# Patient Record
Sex: MALE | Race: WHITE
[De-identification: names, ages, dates, MRNs, and addresses within clinical notes are randomized per-mention and may not be internally consistent; named-entity substitution may affect disease eponyms.]

---

## 2018-02-09 ENCOUNTER — HOSPITAL ENCOUNTER (OUTPATIENT)
Dept: HOSPITAL 75 - RAD | Age: 58
End: 2018-02-09
Attending: FAMILY MEDICINE
Payer: MEDICAID

## 2018-02-09 ENCOUNTER — HOSPITAL ENCOUNTER (INPATIENT)
Dept: HOSPITAL 75 - ER | Age: 58
LOS: 6 days | Discharge: NURSING, REHAB OR LONG-TERM CARE | DRG: 470 | End: 2018-02-15
Attending: FAMILY MEDICINE | Admitting: INTERNAL MEDICINE
Payer: MEDICAID

## 2018-02-09 VITALS — SYSTOLIC BLOOD PRESSURE: 152 MMHG | DIASTOLIC BLOOD PRESSURE: 92 MMHG

## 2018-02-09 VITALS — SYSTOLIC BLOOD PRESSURE: 132 MMHG | DIASTOLIC BLOOD PRESSURE: 98 MMHG

## 2018-02-09 VITALS — BODY MASS INDEX: 27.76 KG/M2 | WEIGHT: 183.19 LBS | HEIGHT: 68 IN
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
## 2018-02-09 DIAGNOSIS — F31.9: ICD-10-CM

## 2018-02-09 DIAGNOSIS — S72.012A: Primary | ICD-10-CM

## 2018-02-09 DIAGNOSIS — R09.89: ICD-10-CM
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
## 2018-02-09 DIAGNOSIS — Z66: ICD-10-CM

## 2018-02-09 DIAGNOSIS — S72.002A: Primary | ICD-10-CM

## 2018-02-09 DIAGNOSIS — G20: ICD-10-CM

## 2018-02-09 DIAGNOSIS — F03.90: ICD-10-CM

## 2018-02-09 DIAGNOSIS — F41.9: ICD-10-CM

## 2018-02-09 LAB
ALBUMIN SERPL-MCNC: 3.6 GM/DL (ref 3.2–4.5)
ALP SERPL-CCNC: 57 U/L (ref 40–136)
ALT SERPL-CCNC: 11 U/L (ref 0–55)
APTT BLD: 28 SEC (ref 24–35)
APTT PPP: (no result) S
BACTERIA #/AREA URNS HPF: (no result) /HPF
BASOPHILS # BLD AUTO: 0 10^3/UL (ref 0–0.1)
BASOPHILS NFR BLD AUTO: 0 % (ref 0–10)
BILIRUB SERPL-MCNC: 0.6 MG/DL (ref 0.1–1)
BILIRUB UR QL STRIP: NEGATIVE
BUN/CREAT SERPL: 36
CALCIUM SERPL-MCNC: 9.6 MG/DL (ref 8.5–10.1)
CHLORIDE SERPL-SCNC: 104 MMOL/L (ref 98–107)
CO2 SERPL-SCNC: 27 MMOL/L (ref 21–32)
CREAT SERPL-MCNC: 0.78 MG/DL (ref 0.6–1.3)
EOSINOPHIL # BLD AUTO: 0.1 10^3/UL (ref 0–0.3)
EOSINOPHIL NFR BLD AUTO: 1 % (ref 0–10)
EOSINOPHIL NFR BLD MANUAL: 2 %
ERYTHROCYTE [DISTWIDTH] IN BLOOD BY AUTOMATED COUNT: 13.7 % (ref 10–14.5)
FIBRINOGEN PPP-MCNC: CLEAR MG/DL
GFR SERPLBLD BASED ON 1.73 SQ M-ARVRAT: > 60 ML/MIN
GLUCOSE SERPL-MCNC: 128 MG/DL (ref 70–105)
GLUCOSE UR STRIP-MCNC: NEGATIVE MG/DL
HCT VFR BLD CALC: 43 % (ref 40–54)
HGB BLD-MCNC: 14.9 G/DL (ref 13.3–17.7)
INR PPP: 1 (ref 0.8–1.4)
KETONES UR QL STRIP: (no result)
LEUKOCYTE ESTERASE UR QL STRIP: (no result)
LYMPHOCYTES # BLD AUTO: 0.7 X 10^3 (ref 1–4)
LYMPHOCYTES NFR BLD AUTO: 6 % (ref 12–44)
MAGNESIUM SERPL-MCNC: 2 MG/DL (ref 1.8–2.4)
MANUAL DIFFERENTIAL PERFORMED BLD QL: YES
MCH RBC QN AUTO: 32 PG (ref 25–34)
MCHC RBC AUTO-ENTMCNC: 35 G/DL (ref 32–36)
MCV RBC AUTO: 91 FL (ref 80–99)
MONOCYTES # BLD AUTO: 1.5 X 10^3 (ref 0–1)
MONOCYTES NFR BLD AUTO: 13 % (ref 0–12)
MONOCYTES NFR BLD: 9 %
NEUTROPHILS # BLD AUTO: 9.3 X 10^3 (ref 1.8–7.8)
NEUTROPHILS NFR BLD AUTO: 80 % (ref 42–75)
NEUTS BAND NFR BLD MANUAL: 78 %
NITRITE UR QL STRIP: NEGATIVE
PH UR STRIP: 6 [PH] (ref 5–9)
PLATELET # BLD: 139 10^3/UL (ref 130–400)
PMV BLD AUTO: 11.5 FL (ref 7.4–10.4)
POTASSIUM SERPL-SCNC: 4 MMOL/L (ref 3.6–5)
PROT SERPL-MCNC: 7 GM/DL (ref 6.4–8.2)
PROT UR QL STRIP: (no result)
PROTHROMBIN TIME: 13.7 SEC (ref 12.2–14.7)
RBC # BLD AUTO: 4.66 10^6/UL (ref 4.35–5.85)
RBC #/AREA URNS HPF: (no result) /HPF
RBC MORPH BLD: NORMAL
SODIUM SERPL-SCNC: 141 MMOL/L (ref 135–145)
SP GR UR STRIP: 1.02 (ref 1.02–1.02)
SQUAMOUS #/AREA URNS HPF: (no result) /HPF
UROBILINOGEN UR-MCNC: 1 MG/DL
VARIANT LYMPHS NFR BLD MANUAL: 11 %
WBC # BLD AUTO: 11.6 10^3/UL (ref 4.3–11)
WBC #/AREA URNS HPF: (no result) /HPF

## 2018-02-09 PROCEDURE — 80053 COMPREHEN METABOLIC PANEL: CPT

## 2018-02-09 PROCEDURE — 80164 ASSAY DIPROPYLACETIC ACD TOT: CPT

## 2018-02-09 PROCEDURE — 80048 BASIC METABOLIC PNL TOTAL CA: CPT

## 2018-02-09 PROCEDURE — 87081 CULTURE SCREEN ONLY: CPT

## 2018-02-09 PROCEDURE — 85730 THROMBOPLASTIN TIME PARTIAL: CPT

## 2018-02-09 PROCEDURE — 94664 DEMO&/EVAL PT USE INHALER: CPT

## 2018-02-09 PROCEDURE — 36415 COLL VENOUS BLD VENIPUNCTURE: CPT

## 2018-02-09 PROCEDURE — 94640 AIRWAY INHALATION TREATMENT: CPT

## 2018-02-09 PROCEDURE — 85007 BL SMEAR W/DIFF WBC COUNT: CPT

## 2018-02-09 PROCEDURE — 96361 HYDRATE IV INFUSION ADD-ON: CPT

## 2018-02-09 PROCEDURE — 72170 X-RAY EXAM OF PELVIS: CPT

## 2018-02-09 PROCEDURE — 83605 ASSAY OF LACTIC ACID: CPT

## 2018-02-09 PROCEDURE — 87070 CULTURE OTHR SPECIMN AEROBIC: CPT

## 2018-02-09 PROCEDURE — 85027 COMPLETE CBC AUTOMATED: CPT

## 2018-02-09 PROCEDURE — 83880 ASSAY OF NATRIURETIC PEPTIDE: CPT

## 2018-02-09 PROCEDURE — 83735 ASSAY OF MAGNESIUM: CPT

## 2018-02-09 PROCEDURE — 87205 SMEAR GRAM STAIN: CPT

## 2018-02-09 PROCEDURE — 73502 X-RAY EXAM HIP UNI 2-3 VIEWS: CPT

## 2018-02-09 PROCEDURE — 96365 THER/PROPH/DIAG IV INF INIT: CPT

## 2018-02-09 PROCEDURE — 81000 URINALYSIS NONAUTO W/SCOPE: CPT

## 2018-02-09 PROCEDURE — 85025 COMPLETE CBC W/AUTO DIFF WBC: CPT

## 2018-02-09 PROCEDURE — 86141 C-REACTIVE PROTEIN HS: CPT

## 2018-02-09 PROCEDURE — 71045 X-RAY EXAM CHEST 1 VIEW: CPT

## 2018-02-09 PROCEDURE — 85014 HEMATOCRIT: CPT

## 2018-02-09 PROCEDURE — 72125 CT NECK SPINE W/O DYE: CPT

## 2018-02-09 PROCEDURE — 70450 CT HEAD/BRAIN W/O DYE: CPT

## 2018-02-09 PROCEDURE — 74019 RADEX ABDOMEN 2 VIEWS: CPT

## 2018-02-09 PROCEDURE — 85610 PROTHROMBIN TIME: CPT

## 2018-02-09 PROCEDURE — 87804 INFLUENZA ASSAY W/OPTIC: CPT

## 2018-02-09 PROCEDURE — 85018 HEMOGLOBIN: CPT

## 2018-02-09 PROCEDURE — 93005 ELECTROCARDIOGRAM TRACING: CPT

## 2018-02-09 PROCEDURE — 51702 INSERT TEMP BLADDER CATH: CPT

## 2018-02-09 PROCEDURE — 87040 BLOOD CULTURE FOR BACTERIA: CPT

## 2018-02-09 PROCEDURE — 73501 X-RAY EXAM HIP UNI 1 VIEW: CPT

## 2018-02-09 PROCEDURE — 94760 N-INVAS EAR/PLS OXIMETRY 1: CPT

## 2018-02-09 RX ADMIN — SODIUM CHLORIDE SCH MLS/HR: 900 INJECTION, SOLUTION INTRAVENOUS at 22:43

## 2018-02-09 NOTE — DIAGNOSTIC IMAGING REPORT
EXAMINATION: Left hip radiographs, two views.



COMPARISON: None. 



HISTORY: 57-year-old male, fall. Left hip pain. 



FINDINGS: There is a displaced left femoral neck fracture. The

primary distal fracture fragment is displaced superiorly by

approximately 1.8 cm. The left hip is not dislocated. There is no

pronounced joint space loss of the left hip, osteophyte

formation, or subchondral cystic change. The pubic symphysis is

normally aligned. There is no identified radiopaque foreign body.





IMPRESSION: 

1. Displaced fracture of the left femoral neck.  



Report was called to nurse Aguilera in the [facility] ER at 4:59

p.m., by martha (for YVONNE). 



Dictated by: 



  Dictated on workstation # [id number]

## 2018-02-09 NOTE — ED HIP PAIN/INJURY
General


Chief Complaint:  Hip/Pelvic Problems


Stated Complaint:  L HIP FX


Nursing Triage Note:  


PT TO ED FROM X-RAY PT HAD L HIP FX ON OUTPATIENT X-RAY. PT BROUGHT BY NH IN 


[place]. PT HAS POOR RESPONSE BY VERBAL, EYES CLOSED. NH STATES HAVE HAD PT FOR 

ONLY 


3 DAYS





LIVED AT HOME W BROTHER TAKING CARE OF HIM


Source:  patient, other (NH staff)


Exam Limitations:  physical impairment (nonverbal, dementia with behavioral 

disturbances, schizophrenia, Parkinson's disease)





History of Present Illness


Date Seen by Provider:  Feb 9, 2018





Allergies and Home Medications


Allergies


Coded Allergies:  


     No Known Drug Allergies (Unverified , 2/9/18)





Past Medical-Social-Family Hx


Patient Social History


Alcohol Use:  Denies Use


Recreational Drug Use:  No


Smoking Status:  Unknown if Ever Smoked


Recent Foreign Travel:  No


Contact w/Someone Who Travel:  No


Recent Infectious Disease Expo:  No


Recent Hopitalizations:  No (unknown)


Physical Abuse:  No


Sexual Abuse:  No





Seasonal Allergies


Seasonal Allergies:  No





Psychosocial


Suicide Risk Score:  0





Physical Exam


Vital Signs





Vital Signs - First Documented








 2/9/18 2/9/18





 15:43 19:29


 


Temp 100.3 


 


Pulse 83 


 


Resp 18 


 


B/P (MAP) 133/81 (98) 


 


Pulse Ox 95 


 


O2 Delivery  Room Air





Capillary Refill : Less Than 3 Seconds





Progress/Results/Core Measures


Results/Orders


Lab Results





Laboratory Tests








Test


  2/9/18


15:56 2/9/18


16:30 Range/Units


 


 


White Blood Count


  11.6 H


  


  4.3-11.0


10^3/uL


 


Red Blood Count


  4.66 


  


  4.35-5.85


10^6/uL


 


Hemoglobin 14.9   13.3-17.7  G/DL


 


Hematocrit 43   40-54  %


 


Mean Corpuscular Volume 91   80-99  FL


 


Mean Corpuscular Hemoglobin 32   25-34  PG


 


Mean Corpuscular Hemoglobin


Concent 35 


  


  32-36  G/DL


 


 


Red Cell Distribution Width 13.7   10.0-14.5  %


 


Platelet Count


  139 


  


  130-400


10^3/uL


 


Mean Platelet Volume 11.5 H  7.4-10.4  FL


 


Neutrophils (%) (Auto) 80 H  42-75  %


 


Lymphocytes (%) (Auto) 6 L  12-44  %


 


Monocytes (%) (Auto) 13 H  0-12  %


 


Eosinophils (%) (Auto) 1   0-10  %


 


Basophils (%) (Auto) 0   0-10  %


 


Neutrophils # (Auto) 9.3 H  1.8-7.8  X 10^3


 


Lymphocytes # (Auto) 0.7 L  1.0-4.0  X 10^3


 


Monocytes # (Auto) 1.5 H  0.0-1.0  X 10^3


 


Eosinophils # (Auto)


  0.1 


  


  0.0-0.3


10^3/uL


 


Basophils # (Auto)


  0.0 


  


  0.0-0.1


10^3/uL


 


Neutrophils % (Manual) 78    %


 


Lymphocytes % (Manual) 11    %


 


Monocytes % (Manual) 9    %


 


Eosinophils % (Manual) 2    %


 


Blood Morphology Comment NORMAL    


 


Prothrombin Time 13.7   12.2-14.7  SEC


 


INR Comment 1.0   0.8-1.4  


 


Activated Partial


Thromboplast Time 28 


  


  24-35  SEC


 


 


Sodium Level 141   135-145  MMOL/L


 


Potassium Level 4.0   3.6-5.0  MMOL/L


 


Chloride Level 104     MMOL/L


 


Carbon Dioxide Level 27   21-32  MMOL/L


 


Anion Gap 10   5-14  MMOL/L


 


Blood Urea Nitrogen 28 H  7-18  MG/DL


 


Creatinine


  0.78 


  


  0.60-1.30


MG/DL


 


Estimat Glomerular Filtration


Rate > 60 


  


   


 


 


BUN/Creatinine Ratio 36    


 


Glucose Level 128 H    MG/DL


 


Lactic Acid Level


  0.85 


  


  0.50-2.00


MMOL/L


 


Calcium Level 9.6   8.5-10.1  MG/DL


 


Magnesium Level 2.0   1.8-2.4  MG/DL


 


Total Bilirubin 0.6   0.1-1.0  MG/DL


 


Aspartate Amino Transf


(AST/SGOT) 40 H


  


  5-34  U/L


 


 


Alanine Aminotransferase


(ALT/SGPT) 11 


  


  0-55  U/L


 


 


Alkaline Phosphatase 57     U/L


 


C-Reactive Protein High


Sensitivity 12.32 H


  


  0.00-0.50


MG/DL


 


Total Protein 7.0   6.4-8.2  GM/DL


 


Albumin 3.6   3.2-4.5  GM/DL


 


Valproic Acid (Depakene) Level


  76.6 


  


  50.0-100.0


UG/ML


 


Urine Color  ORANGE   


 


Urine Clarity  CLEAR   


 


Urine pH  6  5-9  


 


Urine Specific Gravity  1.025 H 1.016-1.022  


 


Urine Protein  2+ H NEGATIVE  


 


Urine Glucose (UA)  NEGATIVE  NEGATIVE  


 


Urine Ketones  3+ H NEGATIVE  


 


Urine Nitrite  NEGATIVE  NEGATIVE  


 


Urine Bilirubin  NEGATIVE  NEGATIVE  


 


Urine Urobilinogen  1  NORMAL  MG/DL


 


Urine Leukocyte Esterase  1+ H NEGATIVE  


 


Urine RBC (Auto)  2+ H NEGATIVE  


 


Urine RBC  NONE   /HPF


 


Urine WBC  0-2   /HPF


 


Urine Squamous Epithelial


Cells 


  2-5 


   /HPF


 


 


Urine Crystals  NONE   /LPF


 


Urine Bacteria  TRACE   /HPF


 


Urine Casts  NONE   /LPF


 


Urine Mucus  LARGE H  /LPF


 


Urine Culture Indicated  NO   








Micro Results





Microbiology


2/9/18 Influenza Types A,B Antigen ([facility]) - Final, Complete


         





My Orders





Orders - JODIE AMBROSIO


Ns Iv 1000 Ml (Sodium Chloride 0.9%) (2/9/18 17:15)


Valproic Acid (2/9/18 17:18)


Ct Head/Cervical Spine Wo (2/9/18 17:21)


Ceftriaxone Injection (Rocephin Injectio (2/9/18 19:45)





Medications Given in ED





Current Medications








 Medications  Dose


 Ordered  Sig/Carlos


 Route  Start Time


 Stop Time Status Last Admin


Dose Admin


 


 Ceftriaxone


 Sodium 1000 mg/


 Sodium Chloride  50 ml @ 


 100 mls/hr  ONCE  ONCE


 IV  2/9/18 19:45


 2/9/18 20:14  2/9/18 19:48


100 MLS/HR


 


 Sodium Chloride  1,000 ml @ 


 0 mls/hr  Q0M ONCE


 IV  2/9/18 15:52


 2/9/18 15:53 DC 2/9/18 16:10


1,000 MLS/HR


 


 Sodium Chloride  1,000 ml @ 


 0 mls/hr  Q0M ONCE


 IV  2/9/18 17:15


 2/9/18 17:16 DC 2/9/18 17:24


1,000 MLS/HR








Vital Signs/I&O





Vital Sign - Last 12Hours








 2/9/18 2/9/18





 15:43 19:29


 


Temp 100.3 


 


Pulse 83 98


 


Resp 18 18


 


B/P (MAP) 133/81 (98) 132/98 (109)


 


Pulse Ox 95 95


 


O2 Delivery  Room Air














Blood Pressure Mean:  109











Departure


Impression


Disposition:  09 ADMITTED AS INPATIENT


Condition:  Stable





Admissions


Decision to Admit Reason:  Admit from ER (General)


Decision to Admit/Date:  Feb 9, 2018


Time/Decision to Admit Time:  19:30





Departure-Patient Inst.


Referrals:  


CORNELIUS SHANE DO (PCP/Family)


Primary Care Physician











JODIE AMBROSIO Feb 9, 2018 19:56

## 2018-02-09 NOTE — DIAGNOSTIC IMAGING REPORT
INDICATION: Preop for hip fracture.



FINDINGS: Portable chest shows the lungs to be well-aerated.

There are no infiltrates. Heart is not enlarged. No pulmonary

edema. No hilar adenopathy. No pneumothorax or pleural effusion.



IMPRESSION: Normal portable chest.



Dictated by: 



  Dictated on workstation # [id number]

## 2018-02-09 NOTE — DIAGNOSTIC IMAGING REPORT
PROCEDURE: CT head and CT cervical spine without contrast.



TECHNIQUE: Multiple contiguous axial images were obtained through

the brain and cervical spine without the use of intravenous

contrast. Sagittal and coronal reformations through the cervical

spine were then performed.



INDICATION:  Fall at nursing home.



FINDINGS:



CT HEAD: There is no evidence of intracranial hemorrhage. There

is no mass effect. There is generalized cortical atrophy.

Ventricles are not dilated. Basal cisterns are clear. Mastoid air

cells and paranasal sinuses are clear. No calvarial fractures.



IMPRESSION: No acute intracranial abnormalities.



CT CERVICAL SPINE.



Sagittal and coronal images show good alignment. Body height is

well maintained. Disc spaces are relatively well-maintained as

well. Facets are in good alignment. The atlantoaxial joint

appears normal. No fracture demonstrated.



IMPRESSION: Mild degenerative changes with no acute abnormalities

of the cervical spine.



 



Dictated by: 



  Dictated on workstation # [id number]

## 2018-02-10 VITALS — DIASTOLIC BLOOD PRESSURE: 92 MMHG | SYSTOLIC BLOOD PRESSURE: 122 MMHG

## 2018-02-10 VITALS — SYSTOLIC BLOOD PRESSURE: 148 MMHG | DIASTOLIC BLOOD PRESSURE: 82 MMHG

## 2018-02-10 VITALS — DIASTOLIC BLOOD PRESSURE: 77 MMHG | SYSTOLIC BLOOD PRESSURE: 149 MMHG

## 2018-02-10 VITALS — DIASTOLIC BLOOD PRESSURE: 69 MMHG | SYSTOLIC BLOOD PRESSURE: 131 MMHG

## 2018-02-10 VITALS — SYSTOLIC BLOOD PRESSURE: 160 MMHG | DIASTOLIC BLOOD PRESSURE: 84 MMHG

## 2018-02-10 VITALS — SYSTOLIC BLOOD PRESSURE: 140 MMHG | DIASTOLIC BLOOD PRESSURE: 82 MMHG

## 2018-02-10 LAB
ALBUMIN SERPL-MCNC: 3.1 GM/DL (ref 3.2–4.5)
ALP SERPL-CCNC: 49 U/L (ref 40–136)
ALT SERPL-CCNC: 39 U/L (ref 0–55)
BASOPHILS # BLD AUTO: 0 10^3/UL (ref 0–0.1)
BASOPHILS NFR BLD AUTO: 0 % (ref 0–10)
BILIRUB SERPL-MCNC: 0.7 MG/DL (ref 0.1–1)
BUN/CREAT SERPL: 33
CALCIUM SERPL-MCNC: 9 MG/DL (ref 8.5–10.1)
CHLORIDE SERPL-SCNC: 109 MMOL/L (ref 98–107)
CO2 SERPL-SCNC: 22 MMOL/L (ref 21–32)
CREAT SERPL-MCNC: 0.6 MG/DL (ref 0.6–1.3)
EOSINOPHIL # BLD AUTO: 0.1 10^3/UL (ref 0–0.3)
EOSINOPHIL NFR BLD AUTO: 2 % (ref 0–10)
ERYTHROCYTE [DISTWIDTH] IN BLOOD BY AUTOMATED COUNT: 13.5 % (ref 10–14.5)
GFR SERPLBLD BASED ON 1.73 SQ M-ARVRAT: > 60 ML/MIN
GLUCOSE SERPL-MCNC: 95 MG/DL (ref 70–105)
HCT VFR BLD CALC: 39 % (ref 40–54)
HGB BLD-MCNC: 13.5 G/DL (ref 13.3–17.7)
LYMPHOCYTES # BLD AUTO: 0.5 X 10^3 (ref 1–4)
LYMPHOCYTES NFR BLD AUTO: 7 % (ref 12–44)
MANUAL DIFFERENTIAL PERFORMED BLD QL: NO
MCH RBC QN AUTO: 32 PG (ref 25–34)
MCHC RBC AUTO-ENTMCNC: 35 G/DL (ref 32–36)
MCV RBC AUTO: 92 FL (ref 80–99)
MONOCYTES # BLD AUTO: 1.1 X 10^3 (ref 0–1)
MONOCYTES NFR BLD AUTO: 14 % (ref 0–12)
NEUTROPHILS # BLD AUTO: 6.1 X 10^3 (ref 1.8–7.8)
NEUTROPHILS NFR BLD AUTO: 78 % (ref 42–75)
PLATELET # BLD: 130 10^3/UL (ref 130–400)
PMV BLD AUTO: 11.7 FL (ref 7.4–10.4)
POTASSIUM SERPL-SCNC: 3.9 MMOL/L (ref 3.6–5)
PROT SERPL-MCNC: 5.9 GM/DL (ref 6.4–8.2)
RBC # BLD AUTO: 4.24 10^6/UL (ref 4.35–5.85)
SODIUM SERPL-SCNC: 140 MMOL/L (ref 135–145)
WBC # BLD AUTO: 7.9 10^3/UL (ref 4.3–11)

## 2018-02-10 RX ADMIN — SODIUM CHLORIDE, SODIUM LACTATE, POTASSIUM CHLORIDE, AND CALCIUM CHLORIDE PRN MLS/HR: 600; 310; 30; 20 INJECTION, SOLUTION INTRAVENOUS at 10:45

## 2018-02-10 RX ADMIN — SODIUM CHLORIDE SCH MLS/HR: 900 INJECTION, SOLUTION INTRAVENOUS at 20:15

## 2018-02-10 RX ADMIN — MORPHINE SULFATE PRN MG: 4 INJECTION, SOLUTION INTRAMUSCULAR; INTRAVENOUS at 02:24

## 2018-02-10 RX ADMIN — FAMOTIDINE SCH MG: 10 INJECTION INTRAVENOUS at 20:14

## 2018-02-10 RX ADMIN — CEFAZOLIN SODIUM SCH MLS/HR: 2 SOLUTION INTRAVENOUS at 22:10

## 2018-02-10 RX ADMIN — SODIUM CHLORIDE SCH MLS/HR: 900 INJECTION, SOLUTION INTRAVENOUS at 05:47

## 2018-02-10 RX ADMIN — FAMOTIDINE SCH MG: 10 INJECTION INTRAVENOUS at 08:31

## 2018-02-10 RX ADMIN — CARBIDOPA AND LEVODOPA SCH EA: 25; 100 TABLET ORAL at 17:03

## 2018-02-10 RX ADMIN — SODIUM CHLORIDE, SODIUM LACTATE, POTASSIUM CHLORIDE, AND CALCIUM CHLORIDE PRN MLS/HR: 600; 310; 30; 20 INJECTION, SOLUTION INTRAVENOUS at 09:40

## 2018-02-10 RX ADMIN — CARBIDOPA AND LEVODOPA SCH EA: 25; 100 TABLET ORAL at 14:00

## 2018-02-10 RX ADMIN — DIVALPROEX SODIUM SCH MG: 500 TABLET, DELAYED RELEASE ORAL at 20:14

## 2018-02-10 RX ADMIN — DIVALPROEX SODIUM SCH MG: 500 TABLET, DELAYED RELEASE ORAL at 12:20

## 2018-02-10 RX ADMIN — SODIUM CHLORIDE SCH MLS/HR: 900 INJECTION, SOLUTION INTRAVENOUS at 18:08

## 2018-02-10 RX ADMIN — SODIUM CHLORIDE SCH MLS/HR: 900 INJECTION, SOLUTION INTRAVENOUS at 08:31

## 2018-02-10 RX ADMIN — MORPHINE SULFATE PRN MG: 4 INJECTION, SOLUTION INTRAMUSCULAR; INTRAVENOUS at 20:24

## 2018-02-10 RX ADMIN — RISPERIDONE SCH MG: 1 TABLET, FILM COATED ORAL at 20:14

## 2018-02-10 RX ADMIN — CEFAZOLIN SODIUM SCH MLS/HR: 2 SOLUTION INTRAVENOUS at 17:04

## 2018-02-10 RX ADMIN — RIVAROXABAN SCH MG: 10 TABLET, FILM COATED ORAL at 17:03

## 2018-02-10 NOTE — DIAGNOSTIC IMAGING REPORT
EXAM: PELVIS



INDICATION: Left hip fracture.



COMPARISON: Left hip radiographs 2/9/2018.



FINDINGS: Interval left total hip arthroplasty. Components appear

intact and well seated on this single image. No radiopaque

foreign bodies. No periprosthetic fractures.



IMPRESSION: Left GUERO. Negative for postoperative purposes.



Dictated by: 



  Dictated on workstation # [id number]

## 2018-02-10 NOTE — DIAGNOSTIC IMAGING REPORT
EXAM: HIP, LEFT (SINGLE VIEW)



INDICATION: LEFT HIP FRACTURE, LATERAL VIEW ONLY



COMPARISON: Left hip radiographs 2/9/2018.



FINDINGS/IMPRESSION: The known subcapital left hip fracture is

not well seen due to overlapping tissues. This could be better

evaluated with repeat radiographs versus CT.



Dictated by: 



  Dictated on workstation # [id number]

## 2018-02-10 NOTE — CONSULTATION
History of Present Illness


History of Present Illness


Patient Consulted On(ministerio/time)


18


Date Seen by Provider:  Feb 10, 2018


Time Seen by Provider:  07:20


Reason for Visit:  Left hip fracture


History of Present Illness


Mr. Jose is a 56 y/o male local nursing home resident with h/o dementia that 

sustained a mechanical GLF onto his Left hip approximately 3 days ago. History 

obtained from patient's family and medical record as patient is not a reliable 

historian secondary to his baseline mental status. The patient began c/o severe 

Left hip pain and an inability to ambulate on his LLE. He was transferred to a 

local urgent care for evaluation where XRs demonstrated a left femoral neck 

fracture. He was subsequently transferred to [facility] for definitive 

treatment. The patient c/o left hip pain. He has no other musculoskeletal 

complaints.





Allergies and Home Medications


Allergies


Coded Allergies:  


     No Known Drug Allergies (Unverified , 18)





Past Medical-Social-Family Hx


Patient Social History


Alcohol Use:  Denies Use


Smoking Status:  Unknown if Ever Smoked


Recent Foreign Travel:  No


Contact w/Someone Who Travel:  No


Recent Infectious Disease Expo:  No


Recent Hopitalizations:  No (unknown)


Physical Abuse:  No


Sexual Abuse:  No





Seasonal Allergies


Seasonal Allergies:  No





Cardiovascular


History of Cardiac Disorders:  Yes





Neurological


History of Neurological Disord:  Yes





Psychosocial


Behavioral Health Disorders:  Anxiety, Schizophrenia, Depression


Suicide Risk Score:  0





Blood Transfusions


History of Blood Disorders:  No


Adverse Reaction to a Blood Tr:  No





Review of Systems-General


Constitutional:  no symptoms reported


EENTM:  no symptoms reported


Respiratory:  no symptoms reported


Cardiovascular:  no symptoms reported


Gastrointestinal:  no symptoms reported


Genitourinary:  no symptoms reported


Musculoskeletal:  joint pain, other (Left hip pain)


Skin:  no symptoms reported


Psychiatric/Neurological:  Other (Dementia)





Physical Exam-General Problems


Physical Exam


Vital Signs





Vital Signs - First Documented








 18





 15:43 19:29


 


Temp 100.3 


 


Pulse 83 


 


Resp 18 


 


B/P (MAP) 133/81 (98) 


 


Pulse Ox 95 


 


O2 Delivery  Room Air





Capillary Refill : Less Than 3 SecondsLess Than 3 Seconds


General Appearance:  no apparent distress


Eyes:  Bilateral Eye Normal Inspection, Bilateral Eye PERRL, Bilateral Eye EOMI


HEENT:  normal ENT inspection


Neck:  full range of motion, supple


Respiratory:  no respiratory distress, no accessory muscle use


Cardiovascular:  normal peripheral pulses, regular rate, rhythm


Peripheral Pulses:  3+ Femoral (L), 3+ Dorsalis Pedis (R)


Gastrointestinal:  non tender, soft


Extremities:  other (LLE: shortened/externally rotated; motor/sensation grossly 

intact, foot well perfused, skin intact, no open wounds)


Skin:  warm/dry





Assessment/Plan


Assessment/Plan


Admission Diagnosis/Plan


A/P:


Displaced fracture Left femoral neck s/p mechanical GLF


Unstable injury that will require surgical stabilization.


Given the patient's physical/mental condition and the chronicity of the injury 

he is not a candidate for ORIF or total hip arthroplasty.


As such I have recommended proceeding with bipolar arthroplasty.


The surgical plan has been discussed in detail with the patient's brother/POA 

including the risks, benefits, potential complications and expected outcomes.


All questions have been answered.


Informed written consent has been obtained to proceed as planned.





Clinical Quality Measures


DVT/VTE Risk/Contraindication:


Risk Factor Score Per Nursin


RFS Level Per Nursing on Admit:  4+=Very High











NEVILLE GILMORE DO Feb 10, 2018 09:46

## 2018-02-10 NOTE — DIAGNOSTIC IMAGING REPORT
INDICATION: Fracture.



FINDINGS: There is a displaced left femoral neck fracture without

dislocation. Colonic fecal load elevated consistent with

constipation. No small bowel dilatation.



IMPRESSION: Displaced left femoral neck fracture and colonic

constipation.



Dictated by: 



  Dictated on workstation # [id number]

## 2018-02-10 NOTE — PROGRESS NOTE-POST OPERATIVE
Post-Operative Progess Note


Surgeon (s)/Assistant (s)


Surgeon


NEVILLE GILMORE DO


Assistant:  Roger Shaver PA-C





Pre-Operative Diagnosis


Displaced subcapital fracture Left femoral neck





Post-Operative Diagnosis





Same





Procedure & Operative Findings


Date of Procedure


2/10/18


Procedure Performed/Findings


Uncemented bipolar hemiarthroplasty Left hip/as above


Anesthesia Type


General





Estimated Blood Loss


Estimated blood loss (mL):  250 mL





Specimens/Packing


Specimens Removed


None











NEVILLE GILMORE DO Feb 10, 2018 11:30

## 2018-02-11 VITALS — SYSTOLIC BLOOD PRESSURE: 148 MMHG | DIASTOLIC BLOOD PRESSURE: 81 MMHG

## 2018-02-11 VITALS — DIASTOLIC BLOOD PRESSURE: 77 MMHG | SYSTOLIC BLOOD PRESSURE: 138 MMHG

## 2018-02-11 VITALS — DIASTOLIC BLOOD PRESSURE: 77 MMHG | SYSTOLIC BLOOD PRESSURE: 137 MMHG

## 2018-02-11 VITALS — SYSTOLIC BLOOD PRESSURE: 155 MMHG | DIASTOLIC BLOOD PRESSURE: 80 MMHG

## 2018-02-11 VITALS — SYSTOLIC BLOOD PRESSURE: 168 MMHG | DIASTOLIC BLOOD PRESSURE: 80 MMHG

## 2018-02-11 LAB
HCT VFR BLD CALC: 35 % (ref 40–54)
HGB BLD-MCNC: 12.3 G/DL (ref 13.3–17.7)

## 2018-02-11 PROCEDURE — 0SRB0JA REPLACEMENT OF LEFT HIP JOINT WITH SYNTHETIC SUBSTITUTE, UNCEMENTED, OPEN APPROACH: ICD-10-PCS | Performed by: ORTHOPAEDIC SURGERY

## 2018-02-11 RX ADMIN — OXYCODONE HYDROCHLORIDE AND ACETAMINOPHEN PRN TAB: 5; 325 TABLET ORAL at 09:52

## 2018-02-11 RX ADMIN — RISPERIDONE SCH MG: 1 TABLET, FILM COATED ORAL at 20:24

## 2018-02-11 RX ADMIN — RISPERIDONE SCH MG: 1 TABLET, FILM COATED ORAL at 09:36

## 2018-02-11 RX ADMIN — CARBIDOPA AND LEVODOPA SCH EA: 25; 100 TABLET ORAL at 17:23

## 2018-02-11 RX ADMIN — CEFAZOLIN SODIUM SCH MLS/HR: 2 SOLUTION INTRAVENOUS at 04:23

## 2018-02-11 RX ADMIN — DIVALPROEX SODIUM SCH MG: 500 TABLET, DELAYED RELEASE ORAL at 09:37

## 2018-02-11 RX ADMIN — MORPHINE SULFATE PRN MG: 4 INJECTION, SOLUTION INTRAMUSCULAR; INTRAVENOUS at 04:32

## 2018-02-11 RX ADMIN — CEFAZOLIN SODIUM SCH MLS/HR: 2 SOLUTION INTRAVENOUS at 09:39

## 2018-02-11 RX ADMIN — CARBIDOPA AND LEVODOPA SCH EA: 25; 100 TABLET ORAL at 05:37

## 2018-02-11 RX ADMIN — SODIUM CHLORIDE SCH MLS/HR: 900 INJECTION, SOLUTION INTRAVENOUS at 04:23

## 2018-02-11 RX ADMIN — RIVAROXABAN SCH MG: 10 TABLET, FILM COATED ORAL at 09:37

## 2018-02-11 RX ADMIN — OXYCODONE HYDROCHLORIDE AND ACETAMINOPHEN PRN TAB: 5; 325 TABLET ORAL at 17:22

## 2018-02-11 RX ADMIN — CARBIDOPA AND LEVODOPA SCH EA: 25; 100 TABLET ORAL at 17:21

## 2018-02-11 RX ADMIN — FAMOTIDINE SCH MG: 10 INJECTION INTRAVENOUS at 09:36

## 2018-02-11 RX ADMIN — MUPIROCIN SCH APPLIC: 20 OINTMENT TOPICAL at 17:18

## 2018-02-11 RX ADMIN — DIVALPROEX SODIUM SCH MG: 500 TABLET, DELAYED RELEASE ORAL at 20:35

## 2018-02-11 RX ADMIN — SODIUM CHLORIDE SCH MLS/HR: 900 INJECTION, SOLUTION INTRAVENOUS at 11:30

## 2018-02-11 RX ADMIN — SODIUM CHLORIDE SCH MLS/HR: 900 INJECTION, SOLUTION INTRAVENOUS at 17:23

## 2018-02-11 RX ADMIN — FAMOTIDINE SCH MG: 10 INJECTION INTRAVENOUS at 20:24

## 2018-02-11 NOTE — XMS REPORT
[facility].

 Created on: 2016



DamonBob

External Reference #: [id number]

: 1960

Sex: Male



Demographics







 Address  [address] [place], [place] [address]

 

 Home Phone  [phone]

 

 Preferred Language  Unknown

 

 Marital Status  Unknown

 

 Restorationism Affiliation  Unknown

 

 Race  White

 

 Ethnic Group  Not  or 





Author







 Taty Bartholomew

 

 [facility]  e[employer]

 

 Address  Unknown

 

 Phone  Unavailable







Care Team Providers







 Care Team Member Name  Role  Phone

 

 Taty New  CP  Unavailable



                                                                



Allergies, Adverse Reactions, Alerts

          





 Substance  Reaction  Event Type

 

 N.K.D.A.  Info Not Available  Non Drug Allergy



                                                                               
         



Problems

          





 Problem Type  Condition  Code  Onset Dates  Condition Status

 

 Assessment  Schizoaffective disorder, unspecified  F25.9     Active

 

 Problem  Schizoaffective disorder, unspecified  F25.9     Active



                                                                               
                   



Medications

          





 Medication  Code System  Code  Instructions  Start Date  End Date  Status  
Dosage

 

 Dilt-CD  NDC  [address]-01  120 MG Orally Once a day           1 capsule

 

 Docusate Sodium  NDC  [address]-43  100 MG Orally twice daily           1 
capsule as needed

 

 Folic Acid  NDC  [address]-73  1 MG Orally Once a day           1 tablet

 

 Paroxetine HCl  NDC  [address]-39  40 MG Orally Once a day           1 tablet 
in the morning

 

 Restoril  NDC  [address]-45  15 MG Orally Once a day           1 capsule at 
bedtime as needed

 

 Carbidopa-Levodopa CR  NDC  0   by mouth twice daily           one tablet

 

 Depakote  NDC  [address]-18  500 MG Orally Twice a day           1 tablet

 

 Entacapone  NDC  [address]-42  200 MG Orally Twice a day           1 tablet

 

 Seroquel  NDC  [address]-10  200 MG Orally Twice a day           1 tablet

 

 Trihexyphenidyl HCl  NDC  [address]-41  2 MG Orally Two times a day           
1 tablet with meals

 

 Bisacodyl  NDC  [address]-47  10 MG Rectal Once a day           1 suppository 
as needed

 

 Lorazepam  NDC  [address]-52  1 MG Orally or injection every 6 hrs PRN       
    1 tablet

 

 Aricept  NDC  [address]-26  10 MG Orally Once a day           1 tablet at 
bedtime

 

 Amantadine HCl  NDC  [address]-99  100 MG Orally daily           1 capsule

 

 Sinemet  NDC  [address]-02   MG Orally four times a day           2 
tablets

 

 Quetiapine Fumarate  NDC  [address]-54  100 MG Orally Once a day           1 
tablet at bedtime

 

 MiraLax  NDC  [address]-09   Orally Once a day           1 packet mixed with 
8 ounces of fluid

 

 Tramadol HCl  NDC  [address]-98  50 MG Orally every 6 hrs           1 tablet 
as needed

 

 Milk of Magnesia  NDC  [address]-85   Orally daily as needed           30ml



                                                                               
                                                                               
                                                                               
                               



Procedures

          





 Procedure  Coding System  Code  Date

 

 OFFICE VISIT, NP-MOD. COMPLEXITY (45 MIN.)  CPT-4  37580  2016



                                                                               
                   



Vital Signs

          





 Date/Time:  2016

 

 Height  71.25 in

 

 Weight  198.75 lbs

 

 Temperature  97.6 F

 

 Blood Pressure Diastolic  76 mm Hg

 

 Blood Pressure Systolic  120 mm Hg

 

 Cardiac Monitoring Heart Rate  80 /min

 

 BMI  27.52 Index

 

 Respiratory Rate  20 /min



                                                                              



Results

          No Known Results                                                     
               



Summary Purpose

          eClinicalWorks Submission

## 2018-02-11 NOTE — XMS REPORT
Transition of care 2017 13:30

 Created on: 2017



LORELEI HENDERSON

External Reference #: [id number]

: 1960

Sex: Male



Demographics







 Address  [address] [place] [address]

 

 Preferred Language  English

 

 Marital Status  Unknown

 

 Restoration Affiliation  Unknown

 

 Race  White

 

 Ethnic Group  Unknown





Author







 Author  GENERATED,  SYSTEM

 

 Organization  Unknown

 

 Address  Unknown

 

 Phone  Unavailable







Care Team Providers







 Care Team Member Name  Role  Phone

 

 UNASSIGNED DOCTOR MD, MD  DOCTOR  PP  [phone]







Reason For Visit





Reason for Visit from 2017 4:35 AM:* Pt Stated Reason for Adm :  Pneymonia







Chief Complaint

PNA, SEPSIS, SCHIZOPHRENIA



Social History





Social History from 2017 1:29 PM:* Tobacco Use? : Unknown if Ever Smoked





Social History from 2017 4:35 AM:* Tobacco Use? : Unknown if Ever Smoked







Functional Status





Functional Status from 2017 12:17 PM:* LOC : Alert

* Oriented To : Unable to Assess

* Weight Bearing Status : Full

* Assist Level : Partial

* # Assists : 1





Functional Status from 2017 9:29 AM:* # Assists : 2





Functional Status from 2017 10:07 PM:* LOC : Alert

* Oriented To : Person

* Weight Bearing Status : Full

* Assist Level : Partial

* # Assists : 2





Functional Status from 2017 4:15 PM:* LOC : Alert

* Oriented To : Person

* Weight Bearing Status : Full

* Assist Level : Partial

* # Assists : 2





Functional Status from 2017 2:04 PM:* Oriented To : Person





Functional Status from 2017 9:16 AM:* Oriented To : Person,Place,Time





Functional Status from 2017 8:45 AM:* # Assists : 2





Functional Status from 2017 7:05 AM:* LOC : Confused

* Oriented To : Person

* Weight Bearing Status : Partial

* Assist Level : Partial

* # Assists : 2





Functional Status from 2017 9:30 PM:* LOC : Alert

* Oriented To : Person,Place

* Weight Bearing Status : Full

* Assist Level : Partial

* # Assists : 2





Functional Status from 2017 9:09 AM:* # Assists : 2





Functional Status from 2017 7:28 AM:* LOC : Alert

* Oriented To : Person,Place

* Weight Bearing Status : Full

* Assist Level : Partial

* # Assists : 2





Functional Status from 2017 10:48 PM:* LOC : Alert

* Oriented To : Person,Place

* Weight Bearing Status : Full

* Assist Level : Partial

* # Assists : 2





Functional Status from 2017 12:32 PM:* # Assists : 2





Functional Status from 2017 7:40 AM:* LOC : Alert

* Oriented To : Other - See Comments

* Weight Bearing Status : Full

* Assist Level : Partial

* # Assists : 1





Functional Status from 2017 8:30 PM:* LOC : Alert

* Oriented To : Person,Place,Time

* Weight Bearing Status : Full

* Assist Level : Dependent

* # Assists : 2





Functional Status from 2017 9:30 AM:* LOC : Confused

* Oriented To : Person

* Weight Bearing Status : Full

* Assist Level : Partial

* # Assists : 1





Functional Status from 2017 8:15 AM:* # Assists : 3





Functional Status from 2017 7:15 PM:* LOC : Confused

* Oriented To : Unable to Assess

* Weight Bearing Status : No wt bearing

* Assist Level : Dependent

* # Assists : 2





Functional Status from 2017 1:00 PM:* # Assists : 2





Functional Status from 2017 10:12 AM:* # Assists : 2





Functional Status from 2017 8:15 AM:* # Assists : 2





Functional Status from 2017 7:34 AM:* LOC : Alert

* Oriented To : Other - See Comments

* Weight Bearing Status : Full

* Assist Level : Partial

* # Assists : 2





Functional Status from 2017 8:00 PM:* LOC : Alert

* Oriented To : Person

* Weight Bearing Status : Full

* Assist Level : Partial

* # Assists : 2





Functional Status from 2017 9:18 AM:* LOC : Confused

* Oriented To : Person

* Weight Bearing Status : Full

* Assist Level : Partial

* # Assists : 3





Functional Status from 2017 8:56 PM:* LOC : Alert

* Oriented To : Person

* Weight Bearing Status : Full

* Assist Level : Partial

* # Assists : 2





Functional Status from 2017 6:58 PM:* # Assists : 3





Functional Status from 2017 4:45 PM:* # Assists : 2





Functional Status from 2017 4:38 PM:* # Assists : 3





Functional Status from 2017 9:05 AM:* # Assists : 3





Functional Status from 2017 9:04 AM:* # Assists : 2





Functional Status from 2017 8:37 AM:* LOC : Confused

* Oriented To : Person

* Weight Bearing Status : Full

* Assist Level : Partial

* # Assists : 2





Functional Status from 2017 8:21 AM:* # Assists : 2





Functional Status from 2017 8:50 PM:* LOC : Confused

* Oriented To : Person

* Weight Bearing Status : Full

* Assist Level : Partial

* # Assists : 2





Functional Status from 2017 7:38 AM:* LOC : Confused

* Oriented To : Other - See Comments

* Weight Bearing Status : Full

* Assist Level : Partial

* # Assists : 2





Functional Status from 2017 8:22 PM:* LOC : Alert

* Oriented To : Person,Place

* Weight Bearing Status : Full

* Assist Level : Partial

* # Assists : 1





Functional Status from 2017 8:33 AM:* LOC : Alert

* Oriented To : Person,Place

* Weight Bearing Status : Full

* Assist Level : Partial

* # Assists : 2





Functional Status from 2017 8:06 PM:* LOC : Alert

* Oriented To : Person,Place,Event

* Weight Bearing Status : Full

* Assist Level : Partial

* # Assists : 1





Functional Status from 2017 4:19 PM:* # Assists : 2





Functional Status from 2017 1:03 PM:* # Assists : 2





Functional Status from 2017 10:54 AM:* # Assists : 2





Functional Status from 2017 10:41 AM:* LOC : Alert

* Oriented To : Person,Place

* Weight Bearing Status : Full

* Assist Level : Partial

* # Assists : 2





Functional Status from 2017 9:07 AM:* # Assists : 2





Functional Status from 2017 4:35 AM:* LOC : Drowsy

* Oriented To : Person,Place

* Weight Bearing Status : Other (See reason in Comments)

* Assist Level : Dependent

* # Assists : 2







Vital Signs





Hospital Vital Signs from 2017 11:20 AM:* Height : 6/1 ft,in

* Temperature : 95.6 F

* Pulse : 95

* Respirations : 18

* BP : 158/91





Hospital Vital Signs from 2017 7:56 AM:* Height : 6/1 ft,in

* Temperature : 97.6 F

* Pulse : 79

* Respirations : 18

* BP : 111/67





Hospital Vital Signs from 2017 6:35 AM:* Weight : 85.2/ kg

* Height : 6/1 ft,in





Hospital Vital Signs from 2017 2:59 AM:* Height : 6/1 ft,in

* Temperature : 97.3 F

* Pulse : 101

* Respirations : 18

* BP : 162/98





Hospital Vital Signs from 2017 11:24 PM:* Height : 6/1 ft,in

* Temperature : 96.6 F

* Pulse : 83

* Respirations : 18

* BP : 135/79





Hospital Vital Signs from 2017 6:32 PM:* Height : 6/1 ft,in

* Temperature : 97.8 F

* Pulse : 108

* Respirations : 18

* BP : 148/85





Hospital Vital Signs from 2017 3:30 PM:* Height : 6/1 ft,in

* Temperature : 97.7 F

* Pulse : 84

* Respirations : 18

* BP : 131/75





Hospital Vital Signs from 2017 12:04 PM:* Height : 6/1 ft,in

* Temperature : 96.9 F

* Pulse : 99

* Respirations : 18

* BP : 142/74





Hospital Vital Signs from 2017 8:43 AM:* Height : 6/1 ft,in

* Temperature : 96.4 F

* Pulse : 105

* Respirations : 18

* BP : 175/90





Hospital Vital Signs from 2017 12:53 AM:* Weight : 88.6/ kg

* Height : 6/1 ft,in





Hospital Vital Signs from 2017 11:07 PM:* Height : 6/1 ft,in

* Temperature : 98.5 F

* Pulse : 99

* Respirations : 20

* BP : 132/90





Hospital Vital Signs from 2017 8:00 PM:* Height : 6/1 ft,in

* Temperature : 98.7 F

* Pulse : 92

* Respirations : 20

* BP : 110/78





Hospital Vital Signs from 2017 4:39 PM:* Height : 6/1 ft,in

* Temperature : 98.4 F

* Pulse : 87

* Respirations : 18

* BP : 111/79





Hospital Vital Signs from 2017 11:48 AM:* Height : 6/1 ft,in

* Temperature : 98.1 F

* Pulse : 98

* Respirations : 18

* BP : 138/90





Hospital Vital Signs from 2017 9:06 AM:* Height : 6/1 ft,in

* Temperature : 98.0 F

* Pulse : 79

* Respirations : 16

* BP : 123/70





Hospital Vital Signs from 2017 10:32 PM:* Height : 6/1 ft,in

* Temperature : 97.6 F

* Pulse : 88

* Respirations : 18

* BP : 131/78





Hospital Vital Signs from 2017 7:29 PM:* Height : 6/1 ft,in

* Temperature : 98.4 F

* Pulse : 92

* Respirations : 18

* BP : 136/90





Hospital Vital Signs from 2017 4:02 PM:* Height : 6/1 ft,in

* Temperature : 98.8 F

* Pulse : 97

* Respirations : 18

* BP : 113/65





Hospital Vital Signs from 2017 8:00 AM:* Height : 6/1 ft,in

* Temperature : 97.4 F

* Pulse : 81

* Respirations : 17

* BP : 121/78





Hospital Vital Signs from 2017 8:01 PM:* Height : 6/1 ft,in

* Temperature : 97.4 F

* Pulse : 84

* Respirations : 18

* BP : 129/74





Hospital Vital Signs from 2017 2:16 PM:* Height : 6/1 ft,in

* Temperature : 97.2 F

* Pulse : 86

* Respirations : 18

* BP : 159/89





Hospital Vital Signs from 2017 7:25 AM:* Height : 6/1 ft,in

* Temperature : 98.4 F

* Pulse : 76

* Respirations : 18

* BP : 148/76





Hospital Vital Signs from 2017 12:13 AM:* Weight : 89.1/ kg

* Height : 6/1 ft,in





Hospital Vital Signs from 2017 10:56 PM:* Height : 6/1 ft,in

* Temperature : 97.5 F

* Pulse : 79

* Respirations : 20

* BP : 128/79





Hospital Vital Signs from 2017 6:19 PM:* Height : 6/1 ft,in

* Temperature : 97.3 F

* Pulse : 91

* Respirations : 18

* BP : 128/71





Hospital Vital Signs from 2017 2:11 PM:* Height : 6/1 ft,in

* Temperature : 98.4 F

* Pulse : 89

* Respirations : 18

* BP : 158/93





Hospital Vital Signs from 2017 11:26 AM:* Height : 6/1 ft,in

* Temperature : 98.6 F

* Pulse : 92

* Respirations : 18

* BP : 165/99





Hospital Vital Signs from 2017 7:29 AM:* Height : 6/1 ft,in

* Temperature : 98.0 F

* Pulse : 91

* Respirations : 18

* BP : 136/80





Hospital Vital Signs from 2017 3:05 AM:* Weight : 88.0/ kg

* Height : 6/1 ft,in





Hospital Vital Signs from 2017 9:26 PM:* Height : 6/1 ft,in

* Temperature : 97.5 F

* Pulse : 91

* Respirations : 16

* BP : 136/74





Hospital Vital Signs from 2017 7:06 PM:* Height : 6/1 ft,in

* Temperature : 97.9 F

* Pulse : 84

* Respirations : 16

* BP : 127/69





Hospital Vital Signs from 2017 2:49 PM:* Height : 6/1 ft,in

* Temperature : 98.0 F

* Pulse : 84

* Respirations : 18

* BP : 139/81





Hospital Vital Signs from 2017 10:22 AM:* Height : 6/1 ft,in

* Temperature : 98.9 F

* Pulse : 85

* Respirations : 18

* BP : 141/85





Hospital Vital Signs from 2017 9:18 AM:* Heart Rate : 88





Hospital Vital Signs from 2017 8:42 AM:* Weight : 87.7/ kg

* Height : 6/1 ft,in





Hospital Vital Signs from 2017 7:37 AM:* Height : 6/1 ft,in

* Temperature : 97.2 F

* Pulse : 80

* Respirations : 18

* BP : 118/68





Hospital Vital Signs from 2017 3:06 AM:* Height : 6/1 ft,in

* Temperature : 97.1 F

* Pulse : 84

* Respirations : 18

* BP : 131/84





Hospital Vital Signs from 2017 12:44 AM:* Weight : 87.7/ kg

* Height : 6/1 ft,in





Hospital Vital Signs from 2017 10:09 PM:* Height : 6/1 ft,in

* Temperature : 98.1 F

* Pulse : 93

* Respirations : 18

* BP : 150/84





Hospital Vital Signs from 2017 7:36 PM:* Height : 6/1 ft,in

* Temperature : 98.3 F

* Pulse : 92

* Respirations : 18

* BP : 113/59





Hospital Vital Signs from 2017 4:17 PM:* Height : 6/1 ft,in

* Temperature : 100.6 F

* Pulse : 98

* Respirations : 18

* BP : 95/55





Hospital Vital Signs from 2017 3:16 PM:* Temperature : 100.6 F





Hospital Vital Signs from 2017 10:15 AM:* Height : 6/1 ft,in

* Temperature : 100.1 F

* Pulse : 100

* Respirations : 18

* BP : 139/70





Hospital Vital Signs from 2017 8:37 AM:* Heart Rate : 110





Hospital Vital Signs from 2017 7:00 AM:* Height : 6/1 ft,in

* Temperature : 99.1 F

* Pulse : 93

* Respirations : 20

* BP : 127/70





Hospital Vital Signs from 2017 2:12 AM:* Height : 6/1 ft,in

* Temperature : 99.6 F

* Pulse : 101

* Respirations : 20

* BP : 138/86





Hospital Vital Signs from 2017 10:44 PM:* Height : 6/1 ft,in

* Temperature : 102.2 F

* Pulse : 103

* Respirations : 20

* BP : 130/70





Hospital Vital Signs from 2017 9:19 PM:* Height : 6/1 ft,in

* Temperature : 101.3 F





Hospital Vital Signs from 2017 7:01 PM:* Height : 6/1 ft,in

* Temperature : 100.7 F

* Pulse : 109

* Respirations : 18

* BP : 149/85





Hospital Vital Signs from 2017 3:10 PM:* Height : 6/1 ft,in

* Temperature : 102.5 F

* Pulse : 48

* Respirations : 18

* BP : 182/72





Hospital Vital Signs from 2017 11:21 AM:* Height : 6/1 ft,in

* Temperature : 100.8 F

* Pulse : 89

* Respirations : 20

* BP : 151/88





Hospital Vital Signs from 2017 8:18 AM:* Weight : 87.5/ kg

* Height : 6/1 ft,in





Hospital Vital Signs from 2017 7:23 AM:* Height : 6/1 ft,in

* Temperature : 100.3 F

* Pulse : 97

* Respirations : 20

* BP : 143/78





Hospital Vital Signs from 2017 2:59 AM:* Weight : 87.5/ kg

* Height : 6/1 ft,in

* Temperature : 100.4 F

* Pulse : 94

* Respirations : 20

* BP : 131/81





Hospital Vital Signs from 2017 10:24 PM:* Height : 6/1 ft,in

* Temperature : 100.6 F

* Pulse : 98

* Respirations : 20

* BP : 135/87





Hospital Vital Signs from 2017 6:02 PM:* Height : 6/1 ft,in

* Temperature : 101.3 F

* Pulse : 94

* Respirations : 18

* BP : 109/57





Hospital Vital Signs from 2017 4:00 PM:* Height : 6/1 ft,in

* Temperature : 101.5 F

* Pulse : 110

* Respirations : 22

* BP : 143/73





Hospital Vital Signs from 2017 1:56 PM:* Height : 6/1 ft,in

* Temperature : 99.4 F

* Pulse : 95

* Respirations : 18

* BP : 127/71





Hospital Vital Signs from 2017 10:48 AM:* Height : 6/1 ft,in

* Temperature : 100.4 F

* Pulse : 107

* Respirations : 18

* BP : 123/82





Hospital Vital Signs from 2017 7:21 AM:* Height : 6/1 ft,in

* Temperature : 98.9 F

* Pulse : 106

* Respirations : 18

* BP : 135/81





Hospital Vital Signs from 2017 11:01 PM:* Height : 6/1 ft,in

* Temperature : 98.7 F

* Pulse : 98

* Respirations : 16

* BP : 125/65





Hospital Vital Signs from 2017 7:40 PM:* Height : 6/1 ft,in

* Temperature : 99.0 F

* Pulse : 69

* Respirations : 18

* BP : 107/58





Hospital Vital Signs from 2017 4:02 PM:* Height : 6/1 ft,in

* Temperature : 99.8 F





Hospital Vital Signs from 2017 2:39 PM:* Height : 6/1 ft,in

* Temperature : 97.4 F

* Pulse : 100

* Respirations : 18

* BP : 126/82





Hospital Vital Signs from 2017 1:01 PM:* Height : 6/1 ft,in

* Temperature : 100.4 F

* Pulse : 112

* Respirations : 18

* BP : 156/85





Hospital Vital Signs from 2017 6:50 AM:* Height : 6/1 ft,in

* Temperature : 98.5 F

* Pulse : 85

* Respirations : 18

* BP : 139/81





Hospital Vital Signs from 2017 4:59 AM:* Weight : 91.3/ kg

* Height : 6/1 ft,in

* Temperature : 98.9 F

* Pulse : 89

* Respirations : 16

* BP : 139/82





Hospital Vital Signs from 2017 4:35 AM:* Weight : 91.3/ kg

* Height : 6/1 ft,in







Results





Blood Gas from 2017 4:39 PM*ARTERIAL PH 7.487  H (7.350-7.450 )

*ARTERIAL PC02 33.1 MM HG L (35.0-45.0 MM HG)

*ART. PO2 63 MM HG L (80-95 MM HG)

*ART. TOTAL CO2 21.3 mmol/L (20.0-30.0 mmol/L)

*ART. BICARBONATE 24.8 MEQ/L (19.0-29.0 MEQ/L)

*ART. BASE EXCESS 2.4  (-2.5-2.5 )

ART. O2 SATURATION 92.5 % (91.0-97.0 %)

*PATIENT ATMOSPHERE ROOM AIR    (Reference Range: not available)



*SPECIMEN SITE ARTERIAL    (Reference Range: not available)





Chemistry from 2017 1:31 PMSODIUM 137 MMOL/L (136-145 MMOL/L)

POTASSIUM 3.6 MMOL/L (3.5-5.1 MMOL/L)

CHLORIDE 102 MMOL/L ( MMOL/L)

TCO2 25.2 MMOL/L (21.0-32.0 MMOL/L)

*ANION GAP 9.8 MMOL/L (8.0-16.0 MMOL/L)

BUN 15 MG/DL (7-18 MG/DL)

CREATININE 0.69 MG/DL L (0.70-1.30 MG/DL)

*BUN/CREATININE RATIO 21.7  H (9.1-17.0 )

GLUCOSE 132 MG/DL H (65-99 MG/DL)

*GFR EST NON AFR AMERICAN >90 ML/MIN   (Reference Range: not available)



*GFR EST AFR AMER >90 ML/MIN   (Reference Range: not available)



CALCIUM 8.8 MG/DL (8.5-10.1 MG/DL)

BILIRUBIN TOTAL 0.30 MG/DL (0.20-1.00 MG/DL)

TOTAL PROTEIN 6.7 GM/DL (6.4-8.2 GM/DL)

ALBUMIN 2.6 GM/DL L (3.4-5.0 GM/DL)

*GLOBULIN 4.1 GM/DL H (2.3-3.5 GM/DL)

*A/G RATIO 0.6  L (1.5-2.2 )

ALK PHOS 45 U/L L ( U/L)

ALT (SGPT) 48 U/L (14-59 U/L)

AST (SGOT) 59 U/L H (15-37 U/L)



Chemistry from 2017 6:07 AMSODIUM 138 MMOL/L (136-145 MMOL/L)

POTASSIUM 4.3 MMOL/L (3.5-5.1 MMOL/L)

CHLORIDE 101 MMOL/L ( MMOL/L)

TCO2 28.2 MMOL/L (21.0-32.0 MMOL/L)

*ANION GAP 8.8 MMOL/L (8.0-16.0 MMOL/L)

BUN 21 MG/DL H (7-18 MG/DL)

CREATININE 0.83 MG/DL (0.70-1.30 MG/DL)

*BUN/CREATININE RATIO 25.3  H (9.1-17.0 )

GLUCOSE 113 MG/DL H (65-99 MG/DL)

*GFR EST NON AFR AMERICAN >90 ML/MIN   (Reference Range: not available)



*GFR EST AFR AMER >90 ML/MIN   (Reference Range: not available)



CALCIUM 9.1 MG/DL (8.5-10.1 MG/DL)

MAGNESIUM 2.1 MG/DL (1.8-2.4 MG/DL)



Chemistry from 2017 1:39 PMPROCALCITONIN 0.49 NG/ML (0.05-0.50 NG/ML)



Chemistry from 2017 7:06 AMSODIUM 134 MMOL/L L (136-145 MMOL/L)

POTASSIUM 3.9 MMOL/L (3.5-5.1 MMOL/L)

CHLORIDE 98 MMOL/L ( MMOL/L)

TCO2 25.7 MMOL/L (21.0-32.0 MMOL/L)

*ANION GAP 10.3 MMOL/L (8.0-16.0 MMOL/L)

BUN 14 MG/DL (7-18 MG/DL)

CREATININE 0.73 MG/DL (0.70-1.30 MG/DL)

*BUN/CREATININE RATIO 19.2  H (9.1-17.0 )

GLUCOSE 114 MG/DL H (65-99 MG/DL)

*GFR EST NON AFR AMERICAN >90 ML/MIN   (Reference Range: not available)



*GFR EST AFR AMER >90 ML/MIN   (Reference Range: not available)



CALCIUM 8.6 MG/DL (8.5-10.1 MG/DL)

ALBUMIN 3.2 GM/DL L (3.4-5.0 GM/DL)

MAGNESIUM 1.8 MG/DL (1.8-2.4 MG/DL)

PHOSPHORUS 2.4 MG/DL L (2.6-4.7 MG/DL)



Hematology from 2017 1:32 PMWBC 8.5 X10e3/UL (3.6-11.2 X10e3/UL)

RBC 4.62 X10e6/UL (4.06-5.63 X10e6/UL)

HEMOGLOBIN 14.7 G/DL (12.5-16.3 G/DL)

HEMATOCRIT 42.7 % (36.7-47.1 %)

*MCV 92.4 FL (80.0-100.0 FL)

*MCH 31.7 PG (27.0-33.0 PG)

*MCHC 34.3 G/DL (32.0-36.0 G/DL)

*RDW 14.0 % (12.3-17.0 %)

*RDWSD 45.5  (37.1-47.8 )

PLATELET 287 X10e3/UL (159-386 X10e3/UL)

*MPV 8.7 FL (7.4-10.4 FL)



Hematology from 2017 5:33 AMWBC 5.9 X10e3/UL (3.6-11.2 X10e3/UL)

RBC 4.43 X10e6/UL (4.06-5.63 X10e6/UL)

HEMOGLOBIN 13.9 G/DL (12.5-16.3 G/DL)

HEMATOCRIT 41.0 % (36.7-47.1 %)

*MCV 92.5 FL (80.0-100.0 FL)

*MCH 31.5 PG (27.0-33.0 PG)

*MCHC 34.0 G/DL (32.0-36.0 G/DL)

*RDW 13.9 % (12.3-17.0 %)

*RDWSD 45.1  (37.1-47.8 )

PLATELET 147 X10e3/UL L (159-386 X10e3/UL)

*MPV 9.7 FL (7.4-10.4 FL)

AUTOMATED DIFF PERFORMED    (Reference Range: not available)



SEGS 85.5 %   (Reference Range: not available)



*LYMPHOCYTES 5.4 %   (Reference Range: not available)



*MONOCYTES 8.9 %   (Reference Range: not available)



*EOSINOPHILS 0.1 %   (Reference Range: not available)



*BASOPHILS 0.1 %   (Reference Range: not available)



*ABSOLUTE NEUTROPHILS 5.10 X10e3/UL (1.80-7.80 X10e3/UL)

*ABSOLUTE LYMPHOCYTES 0.30 X10e3/UL L (1.00-3.00 X10e3/UL)

*ABSOLUTE MONOCYTES 0.50 X10e3/UL (0.30-1.00 X10e3/UL)

*ABSOLUTE EOSINOPHILS 0.00 X10e3/UL (0.00-0.50 X10e3/UL)

*ABSOLUTE BASOPHILS 0.00 X10e3/UL (0.00-0.20 X10e3/UL)



Hematology from 2017 6:07 AMWBC 11.4 X10e3/UL H (3.6-11.2 X10e3/UL)

RBC 4.77 X10e6/UL (4.06-5.63 X10e6/UL)

HEMOGLOBIN 15.1 G/DL (12.5-16.3 G/DL)

HEMATOCRIT 44.2 % (36.7-47.1 %)

*MCV 92.5 FL (80.0-100.0 FL)

*MCH 31.6 PG (27.0-33.0 PG)

*MCHC 34.1 G/DL (32.0-36.0 G/DL)

*RDW 13.8 % (12.3-17.0 %)

*RDWSD 44.2  (37.1-47.8 )

PLATELET 142 X10e3/UL L (159-386 X10e3/UL)

*MPV 9.1 FL (7.4-10.4 FL)



Reference Lab from 2017 1:10 PMLEGIONELLA PNEUMO URINE AG Negative  (
Negative )



DX Radiology from 2017 5:14 AMCHEST 1 VIEW History:

Pneumonia

Priors:  Chest x-ray dated 2017

Findings:

There has been mild interval improvement in right upper right lower lobe

infiltrates.  There has been slight interval progression in a left basilar

infiltrate. No significant pleural effusion or pneumothorax is seen. The

heart size and pulmonary vasculature are within normal

limits.

Impression:

Improving right upper and right lower lobe pneumonia and slight interval

progression in left basilar infiltrate, suspicious for

pneumonia.

Electronically signed by: Ayla Platt MD

Dictated: 2017 08:10    (Reference Range: not available)





DX Radiology from 2017 4:36 PMCHEST 1 VIEW History:

tachypnea

Priors:  Chest x-ray dated 2017

Findings:

Since the prior study there has been interval progression in a right upper

lobe infiltrate and development of a right basilar infiltrate, suggestive

of pneumonia. There has also been development of a subtle left basilar

infiltrate.  No significant pleural effusion or pneumothorax is

seen.

Impression:

Worsening right upper lobe pneumonia and development of right basilar

pneumonia.

Development of a mild left basilar infiltrate which may reflect

atelectasis versus additional pneumonia.

Electronically signed by: Ayla Platt MD

Dictated: 2017 07:51    (Reference Range: not available)





Problems

Encounter Diagnosis

* Mobility Impairment Status:Active. 

* Nutritional Deficiency Status:Active. 





Encounters

Encounter Diagnosis

* Mobility Impairment Status:Active. 

* Nutritional Deficiency Status:Active. 





Plan of Care





Treatment Plan from 2017 1:00 PM:* Care Management Note : BODY,TD,TH,
BUTTON,INPUT,SELECT,TEXTAREA{FONT-SIZE: 10pt; FONT-FAMILY: [place],Helv[employer]; 
COLOR: black;} P,DIV,UL,OL,BLOCKQUOTE{MARGIN-BOTTOM: 0px; MARGIN-TOP: 0px;} BODY
{MARGIN: 5px;} Patient discharging back to [facility]. Viridiana with [facility] 
notified.





Treatment Plan from 2017 4:18 PM:* Care Management Note : BODY,TD,TH,
BUTTON,INPUT,SELECT,TEXTAREA{FONT-SIZE: 10pt; FONT-FAMILY: [place],Helv[employer]; 
COLOR: black;} P,DIV,UL,OL,BLOCKQUOTE{MARGIN-BOTTOM: 0px; MARGIN-TOP: 0px;} BODY
{MARGIN: 5px;} I called [facility] to check on status of patient returning 
since they did not call back. I was informed by  that they cannot 
accept today, that they are still awaiting  to hear from [facility].  When I asked 
why they have not given a 30 day notice if this has been an ongoing issue, I 
was told that it wouldn't do any good because no place will take patient. 
 also stated they spoke with [facility] who is not giving permission to 
make referral to any agency at this time.  At this time, dismissal for today 
will not occur and  will call me tomorrow after she hears from 
[facility].  Due to this issue, report filed with [facility]. 





Treatment Plan from 2017 2:45 PM:* Care Management Note : BODY,TD,TH,
BUTTON,INPUT,SELECT,TEXTAREA{FONT-SIZE: 10pt; FONT-FAMILY: [place],Helv[employer]; 
COLOR: black;} P,DIV,UL,OL,BLOCKQUOTE{MARGIN-BOTTOM: 0px; MARGIN-TOP: 0px;} BODY
{MARGIN: 5px;} I have not heard [facility] from [facility] regarding patient 
coming back to them today so left voice mail on 's phone to call 
me.  I also called patient's brother, Rosalino, to see if he was aware of situation 
that [facility] does not want patient to return.  He stated he just found 
this out today and said [facility] doesn't want patient back and want him 
to go somewhere else.  I asked Rosalino if he is in agreement with them, that 
patient needs another placement.  He was uncertain and then stated he plans to 
call the [facility] and will call me back shortly.





Treatment Plan from 2017 12:45 PM:* Care Management Note : BODY,TD,TH,
BUTTON,INPUT,SELECT,TEXTAREA{FONT-SIZE: 10pt; FONT-FAMILY: [place],Helv[employer]; 
COLOR: black;} P,DIV,UL,OL,BLOCKQUOTE{MARGIN-BOTTOM: 0px; MARGIN-TOP: 0px;} BODY
{MARGIN: 5px;} I received phone call from [facility] .
  She states she may have found placement for patient in [place], [place] and 
requesting I fax information to that facility.  I explained I needed [facility]'s 
permission first and asked if she has talked with Rosalino ([facility]) about this 
placement option.  She stated she will have to call me back.





Treatment Plan from 2017 12:36 PM:* Care Management Note : BODY,TD,TH,
BUTTON,INPUT,SELECT,TEXTAREA{FONT-SIZE: 10pt; FONT-FAMILY: [place],Helv[employer]; 
COLOR: black;} P,DIV,UL,OL,BLOCKQUOTE{MARGIN-BOTTOM: 0px; MARGIN-TOP: 0px;} BODY
{MARGIN: 5px;} UR sent to Viridiana Salazar





Treatment Plan from 2017 11:00 AM:* Care Management Note : BODY,TD,TH,
BUTTON,INPUT,SELECT,TEXTAREA{FONT-SIZE: 10pt; FONT-FAMILY: [place],Helv[employer]; 
COLOR: black;} P,DIV,UL,OL,BLOCKQUOTE{MARGIN-BOTTOM: 0px; MARGIN-TOP: 0px;} BODY
{MARGIN: 5px;} Informed by Dr. Ferraro that patient is ready for discharge today.  
I made contact with [facility] liaison to set up transfer and was informed 
their , Nicky, will have to call me.  I then received phone call 
from Nicky and she stated they do not plan to take patient back due to 
behaviors and that is why they brought him to the emergency room 9 days ago.  I 
stated I was never informed of this and asked if they have given a 30 day 
notice to patient and his family. Nicky stated she has a call to state 
regarding this 30 day notice exemption and will call me back.  I explained to 
her patient's admitting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

## 2018-02-11 NOTE — XMS REPORT
Transition of Care/Referral Summary

 Created on: 2085



ROMULO HENDERSON

External Reference #: [id number]

: 1960

Sex: Male



Demographics







 Address  [address]

[place]  [address]

 

 Home Phone  [phone]

 

 Preferred Language  English

 

 Marital Status  Single

 

 Anabaptism Affiliation  Unspecified Taoist

 

 Race  White

 

 Ethnic Group  Not  or 





Author







 Author  [facility]

 

 Organization  [facility]

 

 Address  Unknown

 

 Phone  Unavailable







Care Team Providers







 Care Team Member Name  Role  Phone

 

 Jani Keenan  PCP  [phone]







Encounter





VC FIN [id number] Date(s): 16 - 16

[facility] [address] [place] [address] [phone]

Discharge Diagnosis: Chronic venous stasis dermatitis

Discharge Diagnosis: Cellulitis of right lower leg

Discharge Diagnosis: Parkinson's disease

Discharge Diagnosis: Schizophrenia

Discharge Disposition: 01-Home or Self Care

Attending Physician: Jani Keenan MD

Admitting Physician: Jani Keenan MD





Vital Signs







 



  Most recent to   1



  oldest [Reference 



  Range]: 

 

 



  Temperature Tympanic   36.4 degC



  [36.6-38.1 degC]   *LOW*



   (16 9:15 AM)

 

 



  Peripheral Pulse   60 bpm



  Rate [ bpm]   (16 9:15 AM)

 

 



  Blood Pressure   135/70 mmHg



  [/60-90 mmHg]   (16 9:15 AM)







Problem List







    



  Condition   Effective Dates   Status   Health Status   Informant

 

    



  Anxiety state    Active  



  (finding)(Confirmed)    

 

    



  Anxiety(Confirmed)   < 14   Resolved  

 

    



  Hypertension(Confirm    Active  



  ed)    

 

    



  Parkinson's(Confirme   < 14   Resolved  



  d)    

 

    



  Parkinson's disease    Active  



  (disorder)(Confirmed    



  )    

 

    



  Schizophrenia    Active  



  (disorder)(Confirmed    



  )    

 

    



  Schizophrenia(Confir   < 14   Resolved  



  med)    

 

    



  Chronic venous    Active  



  stasis    



  dermatitis(Confirmed    



  )    







Allergies, Adverse Reactions, Alerts





No Known Medication Allergies



Medications





amantadine 100 mg oral capsule

1 caps, Oral, Daily, # 30 caps, 0 Refill(s)

Start Date: 3/30/15

Status: Ordered



Aricept 10 mg oral tablet

1 tabs, Oral, Bedtime (once a day), # 30 tabs, 0 Refill(s)

Start Date: 3/30/15

Status: Ordered



Ativan 1 mg oral tablet

1 mg 1 tabs, Oral, q6hr, as needed for anxiety, 0 Refill(s)

Start Date: 16

Status: Ordered



Colace 100 mg oral capsule

100 mg 1 caps, Oral, BID, as needed for constipation, # 20 caps, 0 Refill(s)

Start Date: 16

Status: Ordered



Comtan 200 mg oral tablet

See Instructions, Take 1 tablet (200 MG) by oral route 4 times every day in 
combination with carbidopa and levodopa, 0 Refill(s)

Start Date: 14

Status: Ordered



divalproex sodium 500 mg oral delayed release tablet

See Instructions, TAKE 1 TABLET BY MOUTH TWICE DAILY, # 56 tabs, 2 Refill(s), 
eRx: [facility], TAKE 1 TABLET BY MOUTH TWICE DAILY

Start Date: 16

Status: Ordered



folic acid 1 mg oral tablet

1 mg 1 tabs, Oral, Daily, 0 Refill(s)

Start Date: 9/28/15

Status: Ordered



Milk of Magnesia

30 mL, Oral, Bedtime (once a day), 0 Refill(s)

Start Date: 16

Status: Ordered



MiraLax oral powder for reconstitution

17 g, Oral, Daily, dissolve in water before taking, # 255 g, 0 Refill(s)

Start Date: 16

Status: Ordered



PARoxetine 40 mg oral tablet

See Instructions, TAKE 1 TABLET BY MOUTH DAILY AT BEDTIME, # 28 tabs, 2 Refill(s
), eRx: [facility], TAKE 1 TABLET BY MOUTH DAILY AT BEDTIME

Start Date: 16

Status: Ordered



QUEtiapine 100 mg oral tablet

100 mg 1 tabs, Oral, Daily, 0 Refill(s)

Start Date: 16

Status: Ordered



Restoril 15 mg oral capsule

15 mg 1 caps, Oral, Bedtime (once a day), as needed for sleep, 0 Refill(s)

Start Date: 16

Status: Ordered



SEROquel 300 mg oral tablet

300 mg 1 tabs, Oral, Daily, 0 Refill(s)

Start Date: 16

Status: Ordered



Sinemet 25 mg-100 mg oral tablet

2 tabs, Oral, QID, 0 Refill(s)

Start Date: 14

Status: Ordered



Sinemet CR 50 mg-200 mg oral tablet, extended release

See Instructions, Take 1 tablet by oral route at 10 am and 6 pm., 0 Refill(s)

Start Date: 14

Status: Ordered



Tiazac 120 mg/24 hours oral capsule, extended release

See Instructions, TAKE ONE CAPSULE BY MOUTH EVERY MORNING AT 0800, # 28 caps, 0 
Refill(s), Pharmacy: [facility], TAKE ONE CAPSULE BY MOUTH EVERY 
MORNING AT 0800

Start Date: 14

Status: Ordered



traMADol 50 mg oral tablet

50 mg 1 tabs, Oral, q6hr, [facility] [phone], # 120 tabs, 0 Refill(s)

Start Date: 16

Status: Ordered



trihexyphenidyl 2 mg oral tablet

See Instructions, TAKE 1 TABLET BY MOUTH TWICE DAILY, # 56 tabs, 2 Refill(s), 
eRx: [facility], TAKE 1 TABLET BY MOUTH TWICE DAILY

Start Date: 16

Status: Ordered



Results





No data available for this section



Immunizations







  



  Vaccine   Date   Refusal Reason

 

  



  tetanus/diphth/pertuss (Tdap) adult/adol   5/25/10 

 

  



  pneumococcal 23-polyvalent vaccine   5/25/10 







Procedures







   



  Procedure   Date   Related Diagnosis   Body Site

 

   



  Hernia repair     







Social History







 



  Social History Type   Response

 

 



  Smoking Status   Former smoker1







1d 



Assessment and Plan

Extracted from:





  



  Title: Office Visit Note   Author: Jani Keenan MD   Date: 16









  Assessment/Plan

 1.Schizophrenia

 2.Left inguinal hernia

 3.Chronic venous stasis dermatitis

 4.Cellulitis of right lower leg

 5.Parkinson's disease

 We discussed venous stasis dermatitis and I recommended compression devices. 
He refuses compression stockings and we talked about ACE wraps as an 
alternative but he declines that for now too . I advised close observation of 
the open wound. He is certainly at risk for poor healing and developing a more 
significant wound. Advised that controlling edema as part of what is needed to 
help allow this to heal more quickly.

 

 I extended his cephalexin for a full 10 days because of probable mild 
cellulitis.

 

 We changed docusate to when necessary because of the loose stools.

 

 We will send a copy of the dictation to [facility] and [facility].

## 2018-02-11 NOTE — XMS REPORT
Summary of Care

 Created on: 2017



LORELEI Jose

External Reference #: [id number]

: 1960

Sex: Male



Demographics







 Address  [address] [place]  [address]

 

 Preferred Language  English

 

 Marital Status  Unknown

 

 Samaritan Affiliation  Unknown

 

 Race  Other Race

 

 Ethnic Group  Unknown





Author







 Author  Dionisio Sinha M.D.

 

 Organization  Unknown

 

 Address  [address] [place] [address] [place]  [address]



 

 Phone  Unavailable







Care Team Providers







 Care Team Member Name  Role  Phone

 

 Dionisio Sinha M.D.  Unavailable  Unavailable

 

 Jani Keenan  Unavailable  Unavailable







Functional Status







 Name  Dates  Details

 

 Functional status health issues are not documented

     Status: 









 Name  Dates  Details

 

 Cognitive status health issues are not documented

     Status: 







Problems







 Name  Dates  Details

 

 Anxiety (300.00, F41.9) 

     Status: Active

 

 Chronic venous stasis dermatitis (454.1, I83.10) 

     Status: Active

 

 Hypertension (401.9, I10) 

     Status: Active

 

 Schizophrenia (295.90, F20.9) 

     Status: Active

 

 Chronic constipation (564.00, K59.09) 

     Status: Active

 

 Vitamin deficiency (269.2, E56.9) 

     Status: Active

 

 Nocturia (788.43, R35.1) 

     Status: Active

 

 Parkinson disease, symptomatic (332.0, G20) 

     Status: Active

 

 Suprapubic pain (789.09, R10.2) 

     Status: Active

 

 Incomplete bladder emptying (788.21, R33.9) 

     Status: Active

 

 Increased urinary frequency (788.41, R35.0) 

     Status: Active







Medications







 Name  Dates  Details









 Aricept 10 MG Oral Tablet

TAKE 1 TABLET DAILY AS DIRECTED.



 











Bharath MOSELEY.ERWIN.Dionisio * 





  Start 2017





Active

Ativan 1 MG Oral Tablet

TAKE 1 TABLET EVERY 6 TO 8 HOURS AS NEEDED NAUSEA.

* 





  Refills: 0









Cho M.D., Dionisio * 





  Start 2017





Active

Colace 100 MG Oral Capsule

TAKE 1 CAPSULE TWICE DAILY AS NEEDED.

* 





  Refills: 0









Cho M.D., Dionisio * 





  Start 2017





Active

Comtan 200 MG Oral Tablet

TAKE 1 TABLET 3 TIMES DAILY.

* 





  Refills: 0









Cho M.D., Dionisio * 





  Start 2017





Active

Divalproex Sodium 500 MG Oral Tablet Delayed Release

TAKE 1 TABLET 3 TIMES DAILY AFTER MEALS.

* 





  Refills: 0









Cho M.D., Dionisio * 





  Start 2017





Active

Folic Acid 1 MG Oral Tablet

ONE TABLET BY MOUTH DAILY

* 





  Quantity: 90   Refills: 1









Cho M.D., Dionisio * 





  Start 2017





Active

MiraLax Oral Powder

MIX 1 CAPFUL (17GM) IN 8 OUNCES OF WATER, JUICE, OR TEA AND DRINK DAILY.

* 





  Quantity: 527   Refills: 11









Cho M.D., Dionisio * 





  Start 2017





Active

PARoxetine HCl - 40 MG Oral Tablet

TAKE 1 TABLET DAILY AS DIRECTED.

* 





  Refills: 0









Cho M.D., Dionisio * 





  Start 2017





Active

Restoril 15 MG Oral Capsule

TAKE 1 CAPSULE AT BEDTIME AS NEEDED FOR SLEEP.

* 





  Refills: 0









Cho M.D., Dionisio * 





  Start 2017





Active

Sinemet  MG Oral Tablet

Take 1 tablet daily

* 





  Refills: 0









Cho M.D., Dionisio * 





  Start 2017





Active

Sinemet CR  MG Oral Tablet Extended Release

TAKE 1 TABLET 3 TIMES DAILY.

* 





  Refills: 0









Cho M.D., Dionisio * 





  Start 2017





Active

Tiazac 120 MG Oral Capsule Extended Release 24 Hour

TAKE 1 CAPSULE EVERY MORNING DAILY.

* 





  Refills: 0









Cho M.D., Dionisio * 





  Start 2017





Active

TraMADol HCl - 50 MG Oral Tablet

TAKE 1 TABLET EVERY 6 HOURS AS NEEDED FOR PAIN.

* 





  Refills: 0









Cho M.D., Dionisio * 





  Start 2017





Active

DiltiaZEM  MG Oral Capsule Extended Release 24 Hour

TAKE 1 CAPSULE ONCE DAILY.

* 





  Refills: 0









Cho M.D., Dionisio * 





  Start 2017





Active

Tylenol 325 MG Oral Tablet

TAKE 1 TO 2 TABLETS EVERY 4 HOURS AS NEEDED

* 





  Refills: 0









Cho M.D., Dionisio * 





  Start 2017





Active





Allergies and Adverse Reactions







 Name  Dates  Details

 

 No Known Drug Allergies (Allergy)     Status: Active









Procedures







 Procedure  Dates  Details

 

 History of Hernia Repair      

 

 Procedures not documented      







Immunization







 Name  Dates  Details

 

 Immunizations not documented

      







Family History







 Name  Dates  Details

 

 Family history of cerebrovascular accident (CVA) (V17.1, Z82.3) 

     Status: Active









 Name  Dates  Details

 

 Family history of cardiac disorder (V17.49, Z82.49) 

     Status: Active

 

 Family history of Coronary artery disease (414.00, I25.10) 

     Status: Active









 Name  Dates  Details

 

 Family history of hypertension (V17.49, Z82.49) 

     Status: Active

 

 Family history of malignant neoplasm of breast (V16.3, Z80.3) 

     Status: Active

 

 Family history of diabetes mellitus (V18.0, Z83.3) 

     Status: Active









 Name  Dates  Details

 

 Family history of hypertension (V17.49, Z82.49) 

     Status: Active

 

 Family history of depression (V17.0, Z81.8) 

     Status: Active

 

 Family history of malignant neoplasm of prostate (V16.42, Z80.42) 

     Status: Active









 Name  Dates  Details

 

 Family history of malignant neoplasm of uterus (V16.49, Z80.49) 

     Status: Active







Social History







 Name  Dates  Details

 

 -

     Status: 









 Name  Dates  Details

 

 Former smoker      







Vital Signs







 Date  Test  Result  Details

 

           

 

 2017 13:10

  BP Systolic  116 mm[Hg]  Status: Comments: Location:  ; Position:  











 BP Diastolic  60 mm[Hg]  Status: Comments: Location:  ; Position:  



 

 Heart Rate  72 /min  Status: Comments: Location:  ; 



 

 Weight  196 lb  Status: 









Results







 Date  Description  Value  Details

 

 6-Mar-2017 16:40  CT AB/ PEL WITHOUT IV CONTRAST (FOR KIDNEY STONE)   Comments
: Exam Date: 2017 14:27Dictation Date: 2017 16:40



 

    XC ABD PEL W/O (RENAL STONE)     







Plan of Care







 Name  Dates  Details

 

 Planned Observations 

 

 Planned Goals not documented      







Instructions







 Name  Dates  Details

 

 Instructions not documented

      







Encounters







 Appointment; Dionisio Sinha M.D. 

Encounter Diagnosis: Problem not documented

  On 2017

 13:15

 

 Appointment; Dionisio Sinha M.D. 

Encounter Diagnosis: Problem not documented

  On 2017

 13:15

## 2018-02-11 NOTE — XMS REPORT
Transition of Care/Referral Summary

 Created on: 2119



ROMULO HENDERSON

External Reference #: [id number]

: 1960

Sex: Male



Demographics







 Address  [address]

[place]  [address]

 

 Home Phone  [phone]

 

 Preferred Language  English

 

 Marital Status  Single

 

 Church Affiliation  Unspecified Congregational

 

 Race  White

 

 Ethnic Group  Not  or 





Author







 Author  [facility]

 

 Organization  [facility]

 

 Address  Unknown

 

 Phone  Unavailable







Care Team Providers







 Care Team Member Name  Role  Phone

 

 Jani Keenan  PCP  [phone]







Encounter





VC FIN [id number] Date(s): 6/30/15 - 6/30/15

[facility] [address] [place] [address]-      [phone]

Discharge Diagnosis: Schizophrenia

Discharge Diagnosis: Hypertension

Discharge Diagnosis: Parkinson's disease

Discharge Disposition: 01-Home or Self Care

Attending Physician: Jani Keenan MD

Admitting Physician: Jani Keenan MD





Vital Signs







 



  Most recent to   1



  oldest [Reference 



  Range]: 

 

 



  Temperature Tympanic   37.5 degC



  [36.6-38.1 degC]   (6/30/15 10:16 AM)

 

 



  Peripheral Pulse   76 bpm



  Rate [ bpm]   (6/30/15 10:16 AM)

 

 



  Blood Pressure   118/84 mmHg



  [/60-90 mmHg]   (6/30/15 10:16 AM)







Problem List







    



  Condition   Effective Dates   Status   Health Status   Informant

 

    



  Anxiety(Confirmed)   < 14   Resolved  

 

    



  Anxiety state    Active  



  (finding)(Confirmed)    

 

    



  Hypertension(Confirm    Active  



  ed)    

 

    



  Parkinson's(Confirme   < 14   Resolved  



  d)    

 

    



  Parkinson's disease    Active  



  (disorder)(Confirmed    



  )    

 

    



  Schizophrenia(Confir   < 14   Resolved  



  med)    

 

    



  Schizophrenia    Active  



  (disorder)(Confirmed    



  )    







Allergies, Adverse Reactions, Alerts





No Known Medication Allergies



Medications





amantadine 100 mg oral capsule

1 caps, Oral, Daily, # 30 caps, 0 Refill(s)

Start Date: 3/30/15

Status: Ordered



Aricept 10 mg oral tablet

1 tabs, Oral, Bedtime (once a day), # 30 tabs, 0 Refill(s)

Start Date: 3/30/15

Status: Ordered



Comtan 200 mg oral tablet

See Instructions, Take 1 tablet (200 MG) by oral route 4 times every day in 
combination with carbidopa and levodopa, 0 Refill(s)

Start Date: 14

Status: Ordered



Depakote 250 mg oral delayed release tablet

250 mg 1 tabs, Oral, TID, 0 Refill(s)

Start Date: 9/28/15

Status: Ordered



Depakote 500 mg oral delayed release tablet

500 mg 1 tabs, Oral, BID, 0 Refill(s)

Start Date: 9/28/15

Status: Ordered



folic acid 1 mg oral tablet

1 mg 1 tabs, Oral, Daily, 0 Refill(s)

Start Date: 9/28/15

Status: Ordered



Mirapex 1 mg oral tablet

See Instructions, TAKE ONE TABLET BY MOUTH 2 TIMES PER DAY AT 0800 AND NOON, # 
56 tabs, 1 Refill(s), eRx: [facility], TAKE ONE TABLET BY MOUTH 2 
TIMES PER DAY AT 0800 AND NOON

Start Date: 14

Status: Ordered



Paxil 40 mg oral tablet

40 mg 1 tabs, Oral, Daily, 0 Refill(s)

Start Date: 9/28/15

Status: Ordered



SEROquel 200 mg oral tablet

200 mg 1 tabs, Oral, Daily, 0 Refill(s)

Start Date: 9/28/15

Status: Ordered



Sinemet 25 mg-100 mg oral tablet

2 tabs, Oral, QID, 0 Refill(s)

Start Date: 14

Status: Ordered



Sinemet CR 50 mg-200 mg oral tablet, extended release

See Instructions, Take 1 tablet by oral route at 10 am and 6 pm., 0 Refill(s)

Start Date: 14

Status: Ordered



Tiazac 120 mg/24 hours oral capsule, extended release

See Instructions, TAKE ONE CAPSULE BY MOUTH EVERY MORNING AT 0800, # 28 caps, 0 
Refill(s), Pharmacy: [facility], TAKE ONE CAPSULE BY MOUTH EVERY 
MORNING AT 0800

Start Date: 14

Status: Ordered



trihexyphenidyl 2 mg oral tablet

1 tabs, Oral, BID, 0 Refill(s)

Start Date: 14

Status: Ordered



Results





No data available for this section



Immunizations







  



  Vaccine   Date   Refusal Reason

 

  



  tetanus/diphth/pertuss (Tdap) adult/adol   5/25/10 

 

  



  pneumococcal 23-polyvalent vaccine   5/25/10 







Procedures







   



  Procedure   Date   Related Diagnosis   Body Site

 

   



  Hernia repair     







Social History







 



  Social History Type   Response

 

 



  Smoking Status   Former smoker1







1dc 



Assessment and Plan

Extracted from:





  



  Title: Office Visit Note   Author: Jani Keenan MD   Date: 6/30/15









  Assessment/Plan

 Hypertension

 Parkinson's disease

 Schizophrenia

 Overall he seems to be stable and we will mostly continue current medications.
 In the interest of reducing his pill burden, we changed both Colace and 
tramadol to when necessary dosing. Follow-up per nursing home requirements, 
or otherwise when necessary.



 We will send copies of the dictation to [place] [facility]atoli[place], Dr. Hilliard, 
and Dr. Montoya.

## 2018-02-11 NOTE — XMS REPORT
Transfer Level of Care 2017 20:06

 Created on: 2017



MARCIA HENDERSON

External Reference #: [id number]

: 1960

Sex: Male



Demographics







 Address  [address] [place] [address]

 

 Home Phone  [phone]

 

 Preferred Language  English

 

 Marital Status  Unknown

 

 Caodaism Affiliation  Unknown

 

 Race  White

 

 Ethnic Group  Unknown





Author







 Author  GENERATED,  SYSTEM

 

 Organization  Unknown

 

 Address  Unknown

 

 Phone  Unavailable







Care Team Providers







 Care Team Member Name  Role  Phone

 

  PP  Unavailable







Reason For Visit







Chief Complaint

UA



Social History







Functional Status







Vital Signs







Results





Problems

Encounter Diagnosis





No relevant problems exist.



Encounters

Encounter Diagnosis

No relevant problems exist.



Plan of Care







Procedures





No relevant procedures performed.



Immunizations





No immunizations administered or ordered.



Hospital Course





Hospital Discharge Instructions







Allergies, Adverse Reactions, Alerts

* Latex Allergy has not been assessed.

* IV Contrast Allergy has not been assessed.





Medication





Medication reconciliation has not been performed.

## 2018-02-11 NOTE — XMS REPORT
[facility], [facility].

 Created on: 2016



DamonBob

External Reference #: [id number]

: 1960

Sex: Male



Demographics







 Address  [address]

[place]  [address]

 

 Home Phone  [phone]

 

 Preferred Language  Unknown

 

 Marital Status  Unknown

 

 Restorationism Affiliation  Unknown

 

 Race  White

 

 Ethnic Group  Not  or 





Author







 Taty Bartholomew

 

 [facility]  [employer]

 

 Address  Unknown

 

 Phone  Unavailable







Care Team Providers







 Care Team Member Name  Role  Phone

 

 Taty New    Unavailable



                                                                



Allergies, Adverse Reactions, Alerts

          





 Substance  Reaction  Event Type

 

 N.K.D.A.  Info Not Available  Non Drug Allergy



                                                                               
         



Problems

          





 Problem Type  Condition  Code  Onset Dates  Condition Status

 

 Assessment  Schizoaffective disorder, unspecified  F25.9     Active

 

 Problem  Schizoaffective disorder, unspecified  F25.9     Active



                                                                               
                   



Medications

          





 Medication  Code System  Code  Instructions  Start Date  End Date  Status  
Dosage

 

 Restoril  NDC  [address]-78  15 MG Orally Once a day           1 capsule at 
bedtime as needed

 

 Amantadine HCl  NDC  [address]-35  100 MG Orally daily           1 capsule

 

 Depakote  NDC  [address]-14  500 MG Orally Twice a day           1 tablet

 

 Bisacodyl  NDC  [address]-76  10 MG Rectal Once a day           1 suppository 
as needed

 

 Folic Acid  NDC  [address]-94  1 MG Orally Once a day           1 tablet

 

 Trihexyphenidyl HCl  NDC  [address]-60  2 MG Orally Two times a day           
1 tablet with meals

 

 Sinemet  NDC  [address]-22   MG Orally four times a day           2 
tablets

 

 Paroxetine HCl  NDC  [address]-30  40 MG Orally Once a day           1 tablet 
in the morning

 

 Milk of Magnesia  NDC  [address]-44   Orally daily as needed           30ml

 

 Docusate Sodium  NDC  [address]-05  100 MG Orally twice daily           1 
capsule as needed

 

 Entacapone  NDC  [address]-96  200 MG Orally Twice a day           1 tablet

 

 Aricept  NDC  [address]-65  10 MG Orally Once a day           1 tablet at 
bedtime

 

 Lorazepam  NDC  [address]-01  1 MG Orally or injection every 6 hrs PRN       
    1 tablet

 

 Quetiapine Fumarate  NDC  [address]-56  100 MG Orally Twice a day (in the PM- 
take with 200mg tab)           1 tablet

 

 Tramadol HCl  NDC  [address]-73  50 MG Orally every 6 hrs           1 tablet 
as needed

 

 MiraLax  NDC  [address]-26   Orally Once a day           1 packet mixed with 
8 ounces of fluid

 

 Carbidopa-Levodopa CR  NDC  0   by mouth twice daily           one tablet

 

 Seroquel  NDC  [address]-10  200 MG Orally Once a day at bedtime           1 
tablet

 

 Dilt-CD  NDC  [address]-36  120 MG Orally Once a day           1 capsule



                                                                               
                                                                               
                                                                               
                               



Procedures

          





 Procedure  Coding System  Code  Date

 

 OFFICE VISIT, EST-MOD. COMPLEXITY (25 MIN)  CPT-4  60673  2016



                                                                               
                   



Vital Signs

          





 Date/Time:  2016

 

 Height  71.25 in

 

 Weight  209 lbs

 

 Temperature  98.4 F

 

 Blood Pressure Diastolic  80 mm Hg

 

 Blood Pressure Systolic  128 mm Hg

 

 Cardiac Monitoring Heart Rate  78 /min

 

 BMI  28.94 Index

 

 Respiratory Rate  20 /min



                                                                              



Results

          No Known Results                                                     
               



Summary Purpose

          [employer] Submission

## 2018-02-11 NOTE — PROGRESS NOTE-HOSPITALIST
Subjective


HPI/CC On Admission


Date Seen by Provider:  Feb 11, 2018


Time Seen by Provider:  08:00


The patient is a 57-year-old white male with apparent schizophrenia recently 

admitted to a local nursing home who apparently fell with progressive left hip 

pain.  Upon presentation to the emergency room he had external rotation and 

foreshortening of the leg with a displaced intertrochanteric hip fracture.  He 

is unable to give any meaningful history during the interview but does not 

appear to be in acute distress.  He has no reported past history of 

cardiovascular or pulmonary disease.  He is asking to be fed and wanting to go 

home.


Subjective/Events-last exam


Patient awake and alert but does not respond to verbal cueing.  He was eating 

solids without difficulty and appeared to be in no acute distress.





Objective


Exam


Vital Signs





Vital Signs








  Date Time  Temp Pulse Resp B/P (MAP) Pulse Ox O2 Delivery O2 Flow Rate FiO2


 


2/9/18 15:43 100.3 83 18 133/81 (98) 95   


 


2/9/18 19:29      Room Air  


 


2/10/18 15:34       2.00 





Capillary Refill : Less Than 3 SecondsLess Than 3 Seconds


General Appearance:  No Apparent Distress


Respiratory:  Chest Non Tender, Lungs Clear, Normal Breath Sounds, No Accessory 

Muscle Use, No Respiratory Distress


Cardiovascular:  Regular Rate, Rhythm, No Edema, No Gallop, No JVD, No Murmur, 

Normal Peripheral Pulses


Extremity:  Other (Normal amount of postoperative left hip swelling with trace 

pedal edema on the left none on the right.)





Results/Procedures


Lab


Laboratory Tests


2/11/18 05:13














Assessment/Plan


Assessment and Plan


Assess & Plan/Chief Complaint


1.  Postop day 1 status post left total hip replacement for femoral neck 

fracture doing well.


2.  Reported schizophrenia with developmental disability continue home 

medication.











LAUREN EMERSON MD Feb 11, 2018 11:14

## 2018-02-11 NOTE — XMS REPORT
[facility], [facility].

 Created on: 2016



Bob Jose

External Reference #: [id number]

: 1960

Sex: Male



Demographics







 Address  [address] [place]  [address]

 

 Home Phone  [phone]

 

 Preferred Language  Unknown

 

 Marital Status  Unknown

 

 Taoism Affiliation  Unknown

 

 Race  White

 

 Ethnic Group  Not  or 





Author







 Author  Taty New

 

 Organization  [employer]

 

 Address  Unknown

 

 Phone  Unavailable







Care Team Providers







 Care Team Member Name  Role  Phone

 

 Taty New  CP  Unavailable



                                                                



Allergies

          No Known Allergies                                                   
                                     



Problems

          





 Problem Type  Condition  Code  Onset Dates  Condition Status

 

 Problem  Parkinson's disease  G20     Active

 

 Problem  Schizoaffective disorder, bipolar type  F25.0     Active



                                                                               
                   



Medications

          No Known Medications                                                 
                             



Results

          No Known Results                                                     
               



Summary Purpose

          [employer] Submission

## 2018-02-11 NOTE — XMS REPORT
Transfer Level of Care 2016 16:44

 Created on: 2016



MARCIA HENDERSON

External Reference #: [id number]

: 1960

Sex: Male



Demographics







 Address  [address] [place]  [address]

 

 Home Phone  [phone]

 

 Preferred Language  English

 

 Marital Status  Unknown

 

 Anabaptist Affiliation  Unknown

 

 Race  White

 

 Ethnic Group  Unknown





Author







 Author  GENERATED,  SYSTEM

 

 Organization  Unknown

 

 Address  Unknown

 

 Phone  Unavailable







Care Team Providers







 Care Team Member Name  Role  Phone

 

  PP  Unavailable







Reason For Visit







Chief Complaint

BILAT LEGS



Social History







Functional Status







Vital Signs







Results





Chemistry from 9/10/2016 8:50 AMCHOLESTEROL 150 MG/DL ( MG/DL)

TRIGLYCERIDES 96 MG/DL (0-149 MG/DL)

HDL CHOLESTEROL 42 MG/DL (40-60 MG/DL)

*LDL (CALCULATED) CHOL 89 MG/DL (0-99 MG/DL)

VALPROIC ACID 56 MCG/ML ( MCG/ML)



Problems

Encounter Diagnosis





No relevant problems exist.



Encounters

Encounter Diagnosis

No relevant problems exist.



Plan of Care







Procedures





No relevant procedures performed.



Immunizations





No immunizations administered or ordered.



Hospital Course





Hospital Discharge Instructions







Allergies, Adverse Reactions, Alerts

* Latex Allergy has not been assessed.

* IV Contrast Allergy has not been assessed.





Medication





Medication reconciliation has not been performed.

## 2018-02-11 NOTE — XMS REPORT
Transfer Level of Care 2017 11:31

 Created on: 2017



LORELEI HENDERSON

External Reference #: [id number]

: 1960

Sex: Male



Demographics







 Address  [address] [place]  [address]

 

 Preferred Language  English

 

 Marital Status  Unknown

 

 Anabaptist Affiliation  Unknown

 

 Race  White

 

 Ethnic Group  Unknown





Author







 Author  GENERATED,  SYSTEM

 

 Organization  Unknown

 

 Address  Unknown

 

 Phone  Unavailable







Care Team Providers







 Care Team Member Name  Role  Phone

 

  PP  Unavailable







Reason For Visit







Chief Complaint

G20, S81.809D, K59.00, F41.9, E56.9



Social History







Functional Status







Vital Signs







Results





Problems

Encounter Diagnosis





No relevant problems exist.



Encounters

Encounter Diagnosis

No relevant problems exist.



Plan of Care







Procedures





No relevant procedures performed.



Immunizations





No immunizations administered or ordered.



Hospital Course





Hospital Discharge Instructions







Allergies, Adverse Reactions, Alerts

This section is representative of the current allergy information, at the time 
of the CCD generation. In the case of regeneration of the CCD, the allergy 
information may not reflect the state of known allergies at the time of the CCD'
s subject visit.



* Latex Allergy has not been assessed.

* IV Contrast Allergy has not been assessed.





Medication





Medication reconciliation has not been performed.

## 2018-02-11 NOTE — XMS REPORT
[facility], [facility].

 Created on: 2016



Bob Jose

External Reference #: [id number]

: 1960

Sex: Male



Demographics







 Address  [address] [place]  [address]

 

 Home Phone  [phone]

 

 Preferred Language  Unknown

 

 Marital Status  Unknown

 

 Anglican Affiliation  Unknown

 

 Race  White

 

 Ethnic Group  Not  or 





Author







 Author  Taty New

 

 Organization  [employer]

 

 Address  Unknown

 

 Phone  Unavailable







Care Team Providers







 Care Team Member Name  Role  Phone

 

 Taty New  CP  Unavailable



                                                                



Allergies

          No Known Allergies                                                   
                                     



Problems

          





 Problem Type  Condition  Code  Onset Dates  Condition Status

 

 Problem  Parkinson's disease  G20     Active

 

 Problem  Schizoaffective disorder, bipolar type  F25.0     Active



                                                                               
                   



Medications

          No Known Medications                                                 
                             



Results

          No Known Results                                                     
               



Summary Purpose

          [employer] Submission

## 2018-02-11 NOTE — XMS REPORT
Transition of Care/Referral Summary

 Created on: 2081



ROMULO HENDERSON

External Reference #: [id number]

: 1960

Sex: Male



Demographics







 Address  [address]

[place]  [address]

 

 Home Phone  [phone]

 

 Preferred Language  English

 

 Marital Status  Single

 

 Uatsdin Affiliation  Unspecified Lutheran

 

 Race  White

 

 Ethnic Group  Not  or 





Author







 Author  [facility]

 

 Organization  [facility]

 

 Address  Unknown

 

 Phone  Unavailable







Care Team Providers







 Care Team Member Name  Role  Phone

 

 Jani Keenan  PCP  [phone]







Encounter





[facility] FIN [id number] Date(s): 16 - 16

[facility] [address] [place] [address]-      [phone]

Discharge Diagnosis: Parkinson's disease

Discharge Diagnosis: Healing skin ulcer

Discharge Disposition: 01-Home or Self Care

Attending Physician: Jani Keenan MD

Admitting Physician: Jani Keenan MD





Vital Signs







 



  Most recent to   1



  oldest [Reference 



  Range]: 

 

 



  Temperature Tympanic   36.6 degC



  [36.6-38.1 degC]   (16 2:49 PM)

 

 



  Peripheral Pulse   66 bpm



  Rate [ bpm]   (16 2:49 PM)

 

 



  Blood Pressure   119/74 mmHg



  [/60-90 mmHg]   (16 2:49 PM)







Problem List







    



  Condition   Effective Dates   Status   Health Status   Informant

 

    



  Anxiety state    Active  



  (finding)(Confirmed)    

 

    



  Anxiety(Confirmed)   < 14   Resolved  

 

    



  Hypertension(Confirm    Active  



  ed)    

 

    



  Parkinson's(Confirme   < 14   Resolved  



  d)    

 

    



  Parkinson's disease    Active  



  (disorder)(Confirmed    



  )    

 

    



  Schizophrenia    Active  



  (disorder)(Confirmed    



  )    

 

    



  Schizophrenia(Confir   < 14   Resolved  



  med)    







Allergies, Adverse Reactions, Alerts





No Known Medication Allergies



Medications





amantadine 100 mg oral capsule

1 caps, Oral, Daily, # 30 caps, 0 Refill(s)

Start Date: 3/30/15

Status: Ordered



Aricept 10 mg oral tablet

1 tabs, Oral, Bedtime (once a day), # 30 tabs, 0 Refill(s)

Start Date: 3/30/15

Status: Ordered



Ativan 1 mg oral tablet

1 mg 1 tabs, Oral, q6hr, as needed for anxiety, 0 Refill(s)

Start Date: 16

Status: Ordered



Colace 100 mg oral capsule

100 mg 1 caps, Oral, BID, as needed for constipation, # 20 caps, 0 Refill(s)

Start Date: 16

Status: Ordered



Comtan 200 mg oral tablet

See Instructions, Take 1 tablet (200 MG) by oral route 4 times every day in 
combination with carbidopa and levodopa, 0 Refill(s)

Start Date: 14

Status: Ordered



Depakote 500 mg oral delayed release tablet

500 mg 1 tabs, Oral, BID, 0 Refill(s)

Start Date: 9/28/15

Status: Ordered



folic acid 1 mg oral tablet

1 mg 1 tabs, Oral, Daily, 0 Refill(s)

Start Date: 9/28/15

Status: Ordered



Milk of Magnesia

30 mL, Oral, Bedtime (once a day), 0 Refill(s)

Start Date: 16

Status: Ordered



MiraLax oral powder for reconstitution

17 g, Oral, Daily, dissolve in water before taking, # 255 g, 0 Refill(s)

Start Date: 16

Status: Ordered



Paxil 40 mg oral tablet

40 mg 1 tabs, Oral, Daily, 0 Refill(s)

Start Date: 9/28/15

Status: Ordered



QUEtiapine 100 mg oral tablet

100 mg 1 tabs, Oral, Daily, 0 Refill(s)

Start Date: 16

Status: Ordered



Restoril 15 mg oral capsule

15 mg 1 caps, Oral, Bedtime (once a day), as needed for sleep, 0 Refill(s)

Start Date: 16

Status: Ordered



SEROquel 300 mg oral tablet

300 mg 1 tabs, Oral, Daily, 0 Refill(s)

Start Date: 16

Status: Ordered



Sinemet 25 mg-100 mg oral tablet

2 tabs, Oral, QID, 0 Refill(s)

Start Date: 14

Status: Ordered



Sinemet CR 50 mg-200 mg oral tablet, extended release

See Instructions, Take 1 tablet by oral route at 10 am and 6 pm., 0 Refill(s)

Start Date: 14

Status: Ordered



Tiazac 120 mg/24 hours oral capsule, extended release

See Instructions, TAKE ONE CAPSULE BY MOUTH EVERY MORNING AT 0800, # 28 caps, 0 
Refill(s), Pharmacy: [facility], TAKE ONE CAPSULE BY MOUTH EVERY 
MORNING AT 0800

Start Date: 14

Status: Ordered



traMADol 50 mg oral tablet

50 mg 1 tabs, Oral, q6hr, SENIORRXCJEANETTE [phone], # 120 tabs, 0 Refill(s)

Start Date: 16

Status: Ordered



trihexyphenidyl 2 mg oral tablet

1 tabs, Oral, BID, 0 Refill(s)

Start Date: 14

Status: Ordered



Results





No data available for this section



Immunizations







  



  Vaccine   Date   Refusal Reason

 

  



  tetanus/diphth/pertuss (Tdap) adult/adol   5/25/10 

 

  



  pneumococcal 23-polyvalent vaccine   5/25/10 







Procedures







   



  Procedure   Date   Related Diagnosis   Body Site

 

   



  Hernia repair     







Social History







 



  Social History Type   Response

 

 



  Smoking Status   Former smoker1







1dc 



Assessment and Plan

Extracted from:





  



  Title: Office Visit Note   Author: Jani Keenan MD   Date: 16









  Assessment/Plan

 Healing skin ulcer

 Parkinson's disease

 Overall he is doing well and I think we should just continue present care and 
follow how this heels. Regarding other aspects of care this also seems to be 
stable.

 

 Follow-up in 2 months/per nursing home requirements , or as needed.





 We will send a copy of the dictation to [facility] and [facility].

## 2018-02-11 NOTE — XMS REPORT
[facility], [facility].

 Created on: 2016



Bob Jose

External Reference #: [id number]

: 1960

Sex: Male



Demographics







 Address  [address] [place], [place] [address]

 

 Home Phone  [phone]

 

 Preferred Language  Unknown

 

 Marital Status  Unknown

 

 Mosque Affiliation  Unknown

 

 Race  White

 

 Ethnic Group  Not  or 





Author







 Author  Taty New

 

 Organization  [employer]

 

 Address  Unknown

 

 Phone  Unavailable







Care Team Providers







 Care Team Member Name  Role  Phone

 

 Taty New  CP  Unavailable



                                                                



Allergies

          No Known Allergies                                                   
                                     



Problems

          





 Problem Type  Condition  Code  Onset Dates  Condition Status

 

 Problem  Schizoaffective disorder, unspecified  F25.9     Active



                                                                               
         



Medications

          No Known Medications                                                 
                             



Results

          No Known Results                                                     
               



Summary Purpose

          [employer] Submission

## 2018-02-11 NOTE — XMS REPORT
Transition of Care/Referral Summary

 Created on: 2123



ROMULO HENDERSON

External Reference #: [id number]

: 1960

Sex: Male



Demographics







 Address  [address]

[place]  [address]

 

 Home Phone  [phone]

 

 Preferred Language  English

 

 Marital Status  Single

 

 Jainism Affiliation  Unspecified Church

 

 Race  White

 

 Ethnic Group  Not  or 





Author







 Author  [facility]

 

 Organization  [facility]

 

 Address  Unknown

 

 Phone  Unavailable







Care Team Providers







 Care Team Member Name  Role  Phone

 

 Jani Keenan  PCP  [phone]







Encounter





VC FIN [id number] Date(s): 16 - 16

[facility], [address] [place] [address] [phone]

Discharge Diagnosis: Parkinson's disease

Discharge Diagnosis: Schizophrenia

Discharge Diagnosis: Chronic constipation

Discharge Diagnosis: Urine frequency

Discharge Disposition: 01-Home or Self Care

Attending Physician: Jani Keenan MD

Admitting Physician: Jani Keenan MD





Vital Signs







 



  Most recent to   1



  oldest [Reference 



  Range]: 

 

 



  Temperature Tympanic   36.3 degC



  [36.6-38.1 degC]   *LOW*



   (16 10:41 AM)

 

 



  Peripheral Pulse   73 bpm



  Rate [ bpm]   (16 10:41 AM)

 

 



  Blood Pressure   113/79 mmHg



  [/60-90 mmHg]   (16 10:41 AM)







Problem List







    



  Condition   Effective Dates   Status   Health Status   Informant

 

    



  Anxiety state    Active  



  (finding)(Confirmed)    

 

    



  Anxiety(Confirmed)   < 14   Resolved  

 

    



  Hypertension(Confirm    Active  



  ed)    

 

    



  Parkinson's(Confirme   < 14   Resolved  



  d)    

 

    



  Parkinson's disease    Active  



  (disorder)(Confirmed    



  )    

 

    



  Schizophrenia    Active  



  (disorder)(Confirmed    



  )    

 

    



  Schizophrenia(Confir   < 14   Resolved  



  med)    

 

    



  Chronic venous    Active  



  stasis    



  dermatitis(Confirmed    



  )    







Allergies, Adverse Reactions, Alerts





No Known Medication Allergies



Medications





Aricept 10 mg oral tablet

1 tabs, Oral, Bedtime (once a day), # 30 tabs, 0 Refill(s)

Start Date: 3/30/15

Status: Ordered



Ativan 1 mg oral tablet

1 mg 1 tabs, Oral, q6hr, as needed for anxiety, Fax to August, # 90 tabs, 0 
Refill(s)

Start Date: 16

Status: Ordered



Colace 100 mg oral capsule

100 mg 1 caps, Oral, BID, as needed for constipation, # 20 caps, 0 Refill(s)

Start Date: 16

Status: Ordered



Comtan 200 mg oral tablet

See Instructions, Take 1 tablet (200 MG) by oral route 2 times every day in 
combination with carbidopa and levodopa, 0 Refill(s)

Start Date: 14

Status: Ordered



divalproex sodium 500 mg oral delayed release tablet

See Instructions, TAKE 1 TABLET BY MOUTH TWICE DAILY, # 56 tabs, 2 Refill(s), 
eRx: [facility], TAKE 1 TABLET BY MOUTH TWICE DAILY

Start Date: 16

Status: Ordered



folic acid 1 mg oral tablet

1 mg 1 tabs, Oral, Daily, 0 Refill(s)

Start Date: 9/28/15

Status: Ordered



MiraLax oral powder for reconstitution

17 g, Oral, Daily, dissolve in water before taking, # 255 g, 0 Refill(s)

Start Date: 16

Status: Ordered



PARoxetine 40 mg oral tablet

See Instructions, TAKE 1 TABLET BY MOUTH DAILY AT BEDTIME, # 28 tabs, 2 Refill(s
), eRx: [facility], TAKE 1 TABLET BY MOUTH DAILY AT BEDTIME

Start Date: 16

Status: Ordered



Sinemet 25 mg-100 mg oral tablet

1 tabs, Oral, QID, 0 Refill(s)

Start Date: 14

Status: Ordered



Sinemet CR 50 mg-200 mg oral tablet, extended release

See Instructions, Take 1 tablet by oral route at 10 am and 6 pm., 0 Refill(s)

Start Date: 14

Status: Ordered



Tiazac 120 mg/24 hours oral capsule, extended release

See Instructions, TAKE ONE CAPSULE BY MOUTH EVERY MORNING AT 0800, # 28 caps, 0 
Refill(s), Pharmacy: [facility], TAKE ONE CAPSULE BY MOUTH EVERY 
MORNING AT 0800

Start Date: 14

Status: Ordered



traMADol 50 mg oral tablet

50 mg 1 tabs, Oral, q6hr, Fax to August, # 90 tabs, 0 Refill(s)

Start Date: 16

Status: Ordered



Results





Urinalysis





 



  Most recent to   1



  oldest [Reference 



  Range]: 

 

 



  UA Color   DkYellow



   (16 11:36 AM)

 

 



  UA Appear   Clear



   (16 11:36 AM)

 

 



  UA pH [5.0-8.0]   6.0



   (16 11:36 AM)

 

 



  UA Leuk Est   Negative



  [Negative]   (16 11:36 AM)

 

 



  UA Nitrite   Negative



  [Negative]   (16 11:36 AM)

 

 



  UA Protein   Negative



  [Negative]   (16 11:36 AM)

 

 



  UA Glucose   Negative



  [Negative]   (16 11:36 AM)

 

 



  UA Ketones   Pos 1+



  [Negative]   *ABN*



   (16 11:36 AM)

 

 



  UA Urobilinogen   1.0 mg/dL



  [<1.0 mg/dL]   (16 11:36 AM)

 

 



  UA Bili [Negative]   Negative



   (16 11:36 AM)

 

 



  UA Blood [Negative]   Negative



   (16 11:36 AM)

 

 



  UA Spec Grav   1.028



  [1.003-1.030]   (16 11:36 AM)

 

 



  Type   Clean Catch



   (16 11:36 AM)







Immunizations





Given and Recorded





   



  Vaccine   Date   Status   Refusal Reason

 

   



  tetanus/diphth/pertuss (Tdap) adult/adol   5/25/10   Recorded 

 

   



  pneumococcal 23-polyvalent vaccine   5/25/10   Recorded 







Procedures







   



  Procedure   Date   Related Diagnosis   Body Site

 

   



  Hernia repair     







Social History







 



  Social History Type   Response

 

 



  Smoking Status   Former smoker1







1d 



Assessment and Plan

Extracted from:





  



  Title: CDM OV   Author: Jani Keenan MD   Date: 16









  Impression and Plan

Diagnosis

Chronic constipation (OQG68-WG K59.09, Discharge, Medical).

Parkinson's disease (WND29-JM G20, Discharge, Medical).

Schizophrenia (MUP06-LJ F20.9, Discharge, Medical).

Urine frequency (YJT43-MU R35.0, Discharge, Medical).

Orders

Orders (Selected)

Outpatient Orders

Ordered (Pending Collection)

Urinalysis with Culture if Indicated: .

## 2018-02-11 NOTE — XMS REPORT
[facility], [facility].

 Created on: 2016



DamonBob

External Reference #: [id number]

: 1960

Sex: Male



Demographics







 Address  [place]  [address]

 

 Home Phone  [phone]

 

 Preferred Language  Unknown

 

 Marital Status  Unknown

 

 Denominational Affiliation  Unknown

 

 Race  White

 

 Ethnic Group  Not  or 





Author







 Taty Bartholomew

 

 [facility]  eClinical[employer]

 

 Address  Unknown

 

 Phone  Unavailable







Care Team Providers







 Care Team Member Name  Role  Phone

 

 Taty New    Unavailable



                                                                



Allergies, Adverse Reactions, Alerts

          





 Substance  Reaction  Event Type

 

 N.K.D.A.  Info Not Available  Non Drug Allergy



                                                                               
         



Problems

          





 Problem Type  Condition  Code  Onset Dates  Condition Status

 

 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

 

 Problem  Parkinson's disease  G20     Active

 

 Assessment  Parkinson's disease  G20     Active

 

 Assessment  Schizoaffective disorder, bipolar type  F25.0     Active



                                                                               
                                                 



Medications

          





 Medication  Code System  Code  Instructions  Start Date  End Date  Status  
Dosage

 

 Carbidopa-Levodopa  NDC  [address]-44   MG Orally four times a day  Aug 
02, 2016        1 tablet

 

 Aricept  NDC  [address]-86  10 MG Orally Once a day           1 tablet at 
bedtime

 

 Carbidopa-Levodopa ER  NDC  [address]-09   MG Orally Twice a day  Aug 02
, 2016        1 tablet

 

 Dilt-CD  NDC  [address]-13  120 MG Orally Once a day           1 capsule

 

 Docusate Sodium  NDC  [address]-01  100 MG Orally twice daily           1 
capsule as needed

 

 Tramadol HCl  NDC  [address]-19  50 MG Orally every 6 hrs           1 tablet 
as needed

 

 Folic Acid  NDC  [address]-46  1 MG Orally Once a day           1 tablet

 

 Lorazepam  NDC  [address]-60  1 MG Orally or injection every 6 hrs PRN       
    1 tablet

 

 Seroquel  NDC  [address]-10  100 MG Orally Once a day  2016        1 
tablet at bedtime

 

 Nuplazid (pimavanserin)  NDC  [address]-86  17mg oral daily (with or without 
food)  Aug 03, 2016  Oct 02, 2016     2 tablets

 

 MiraLax  NDC  [address]-61   Orally Once a day           1 packet mixed with 
8 ounces of fluid

 

 Depakote  NDC  [address]-16  500 MG Orally Twice a day           1 tablet

 

 Paroxetine HCl  NDC  [address]-69  40 MG Orally Once a day           1 tablet 
at bedtime

 

 Restoril  NDC  [address]-25  15 MG Orally Once a day           1 capsule at 
bedtime as needed



                                                                               
                                                                               
                                                            



Procedures

          





 Procedure  Coding System  Code  Date

 

 OFFICE VISIT, EST-LOW COMPLEXITY (15 MIN.)  CPT-4  71900  Aug 23, 2016



                                                                               
                   



Vital Signs

          





 Date/Time:  Aug 23, 2016

 

 Temperature  98.7 F

 

 Height  71.25 in

 

 Weight  204.7 lbs

 

 Blood Pressure Diastolic  72 mm Hg

 

 Blood Pressure Systolic  132 mm Hg

 

 Cardiac Monitoring Heart Rate  82 /min

 

 BMI  28.35 Index

 

 Respiratory Rate  20 /min



                                                                              



Results

          No Known Results                                                     
               



Summary Purpose

          eClinicalWorks Submission

## 2018-02-11 NOTE — XMS REPORT
Transfer Level of Care 2016 18:45

 Created on: 2016



MARCIA HENDERSON

External Reference #: [id number]

: 1960

Sex: Male



Demographics







 Address  [address] [place] [address]

 

 Home Phone  [phone]

 

 Preferred Language  English

 

 Marital Status  Unknown

 

 Presybeterian Affiliation  Unknown

 

 Race  White

 

 Ethnic Group  Unknown





Author







 Author  GENERATED,  SYSTEM

 

 Organization  Unknown

 

 Address  Unknown

 

 Phone  Unavailable







Care Team Providers







 Care Team Member Name  Role  Phone

 

  PP  Unavailable







Reason For Visit







Chief Complaint

16 LE EDEMA



Social History







Functional Status





Functional Status from 2016 12:14 PM:* Oriented To : Person,Place,Time,
Event







Vital Signs







Results





Problems

Encounter Diagnosis





No relevant problems exist.



Encounters

Encounter Diagnosis

No relevant problems exist.



Plan of Care







Procedures





No relevant procedures performed.



Immunizations





No immunizations administered or ordered.



Hospital Course





Hospital Discharge Instructions







Allergies, Adverse Reactions, Alerts

* Latex Allergy has not been assessed.

* IV Contrast Allergy has not been assessed.





Medication





Medication reconciliation has not been performed.

## 2018-02-11 NOTE — XMS REPORT
Transition of Care/Referral Summary

 Created on: 2128



ROMULO HENDERSON

External Reference #: [id number]

: 1960

Sex: Male



Demographics







 Address  [address]

[place]  [address]

 

 Home Phone  [phone]

 

 Preferred Language  English

 

 Marital Status  Single

 

 Religion Affiliation  Unspecified Voodoo

 

 Race  White

 

 Ethnic Group  Not  or 





Author







 Author  [facility]

 

 Organization  [facility]

 

 Address  Unknown

 

 Phone  Unavailable







Care Team Providers







 Care Team Member Name  Role  Phone

 

 Jani Keenan  PCP  [phone]







Encounter





VC FIN [id number] Date(s): 6/30/15 - 6/30/15

[facility] [address] [place] [address]      [phone]

Discharge Diagnosis: Schizophrenia

Discharge Diagnosis: Hypertension

Discharge Diagnosis: Parkinson's disease

Discharge Disposition: 01-Home or Self Care

Attending Physician: Jani Keenan MD

Admitting Physician: Jani Keenan MD





Vital Signs







 



  Most recent to   1



  oldest [Reference 



  Range]: 

 

 



  Temperature Tympanic   37.5 degC



  [36.6-38.1 degC]   (6/30/15 10:16 AM)

 

 



  Peripheral Pulse   76 bpm



  Rate [ bpm]   (6/30/15 10:16 AM)

 

 



  Blood Pressure   118/84 mmHg



  [/60-90 mmHg]   (6/30/15 10:16 AM)







Problem List







    



  Condition   Effective Dates   Status   Health Status   Informant

 

    



  Anxiety(Confirmed)   < 14   Resolved  

 

    



  Anxiety state    Active  



  (finding)(Confirmed)    

 

    



  Hypertension(Confirm    Active  



  ed)    

 

    



  Parkinson's(Confirme   < 14   Resolved  



  d)    

 

    



  Parkinson's disease    Active  



  (disorder)(Confirmed    



  )    

 

    



  Schizophrenia(Confir   < 14   Resolved  



  med)    

 

    



  Schizophrenia    Active  



  (disorder)(Confirmed    



  )    







Allergies, Adverse Reactions, Alerts





No Known Medication Allergies



Medications





amantadine 100 mg oral capsule

1 caps, Oral, Daily, # 30 caps, 0 Refill(s)

Start Date: 3/30/15

Status: Ordered



Aricept 10 mg oral tablet

1 tabs, Oral, Bedtime (once a day), # 30 tabs, 0 Refill(s)

Start Date: 3/30/15

Status: Ordered



Comtan 200 mg oral tablet

See Instructions, Take 1 tablet (200 MG) by oral route 4 times every day in 
combination with carbidopa and levodopa, 0 Refill(s)

Start Date: 14

Status: Ordered



Depakote 250 mg oral delayed release tablet

250 mg 1 tabs, Oral, TID, 0 Refill(s)

Start Date: 9/28/15

Status: Ordered



Depakote 500 mg oral delayed release tablet

500 mg 1 tabs, Oral, BID, 0 Refill(s)

Start Date: 9/28/15

Status: Ordered



folic acid 1 mg oral tablet

1 mg 1 tabs, Oral, Daily, 0 Refill(s)

Start Date: 9/28/15

Status: Ordered



Mirapex 1 mg oral tablet

See Instructions, TAKE ONE TABLET BY MOUTH 2 TIMES PER DAY AT 0800 AND NOON, # 
56 tabs, 1 Refill(s), eRx: [facility], TAKE ONE TABLET BY MOUTH 2 
TIMES PER DAY AT 0800 AND NOON

Start Date: 14

Status: Ordered



Paxil 40 mg oral tablet

40 mg 1 tabs, Oral, Daily, 0 Refill(s)

Start Date: 9/28/15

Status: Ordered



SEROquel 200 mg oral tablet

200 mg 1 tabs, Oral, Daily, 0 Refill(s)

Start Date: 9/28/15

Status: Ordered



Sinemet 25 mg-100 mg oral tablet

2 tabs, Oral, QID, 0 Refill(s)

Start Date: 14

Status: Ordered



Sinemet CR 50 mg-200 mg oral tablet, extended release

See Instructions, Take 1 tablet by oral route at 10 am and 6 pm., 0 Refill(s)

Start Date: 14

Status: Ordered



Tiazac 120 mg/24 hours oral capsule, extended release

See Instructions, TAKE ONE CAPSULE BY MOUTH EVERY MORNING AT 0800, # 28 caps, 0 
Refill(s), Pharmacy: [facility], TAKE ONE CAPSULE BY MOUTH EVERY 
MORNING AT 0800

Start Date: 14

Status: Ordered



trihexyphenidyl 2 mg oral tablet

1 tabs, Oral, BID, 0 Refill(s)

Start Date: 14

Status: Ordered



Results





No data available for this section



Immunizations







  



  Vaccine   Date   Refusal Reason

 

  



  tetanus/diphth/pertuss (Tdap) adult/adol   5/25/10 

 

  



  pneumococcal 23-polyvalent vaccine   5/25/10 







Procedures







   



  Procedure   Date   Related Diagnosis   Body Site

 

   



  Hernia repair     







Social History







 



  Social History Type   Response

 

 



  Smoking Status   Former smoker1







1dc 



Assessment and Plan

Extracted from:





  



  Title: Office Visit Note   Author: Jani Keenan MD   Date: 6/30/15









  Assessment/Plan

 Hypertension

 Parkinson's disease

 Schizophrenia

 Overall he seems to be stable and we will mostly continue current medications.
 In the interest of reducing his pill burden, we changed both Colace and 
tramadol to when necessary dosing. Follow-up per nursing home requirements, 
or otherwise when necessary.



 We will send copies of the dictation [place], Dr. Hilliard, 
and Dr. Montoya.

## 2018-02-11 NOTE — XMS REPORT
Transition of Care/Referral Summary

 Created on: 2112



ROMULO HENDERSON

External Reference #: [id number]

: 1960

Sex: Male



Demographics







 Address  [address]

[place]  [address]

 

 Home Phone  [phone]

 

 Preferred Language  English

 

 Marital Status  Single

 

 Scientology Affiliation  Unspecified Presybeterian

 

 Race  White

 

 Ethnic Group  Not  or 





Author







 Author  [facility]

 

 Organization  [facility]

 

 Address  Unknown

 

 Phone  Unavailable







Care Team Providers







 Care Team Member Name  Role  Phone

 

 Jani Keenan  PCP  [phone]







Encounter





VC FIN [id number] Date(s): 6/30/15 - 6/30/15

[facility] [address] [place] [address]      [phone]

Discharge Diagnosis: Schizophrenia

Discharge Diagnosis: Hypertension

Discharge Diagnosis: Parkinson's disease

Discharge Disposition: 01-Home or Self Care

Attending Physician: Jani Keenan MD

Admitting Physician: Jani Keenan MD





Vital Signs







 



  Most recent to   1



  oldest [Reference 



  Range]: 

 

 



  Temperature Tympanic   37.5 degC



  [36.6-38.1 degC]   (6/30/15 10:16 AM)

 

 



  Peripheral Pulse   76 bpm



  Rate [ bpm]   (6/30/15 10:16 AM)

 

 



  Blood Pressure   118/84 mmHg



  [/60-90 mmHg]   (6/30/15 10:16 AM)







Problem List







    



  Condition   Effective Dates   Status   Health Status   Informant

 

    



  Anxiety(Confirmed)   < 14   Resolved  

 

    



  Anxiety state    Active  



  (finding)(Confirmed)    

 

    



  Hypertension(Confirm    Active  



  ed)    

 

    



  Parkinson's(Confirme   < 14   Resolved  



  d)    

 

    



  Parkinson's disease    Active  



  (disorder)(Confirmed    



  )    

 

    



  Schizophrenia(Confir   < 14   Resolved  



  med)    

 

    



  Schizophrenia    Active  



  (disorder)(Confirmed    



  )    







Allergies, Adverse Reactions, Alerts





No Known Medication Allergies



Medications





amantadine 100 mg oral capsule

1 caps, Oral, Daily, # 30 caps, 0 Refill(s)

Start Date: 3/30/15

Status: Ordered



Aricept 10 mg oral tablet

1 tabs, Oral, Bedtime (once a day), # 30 tabs, 0 Refill(s)

Start Date: 3/30/15

Status: Ordered



Comtan 200 mg oral tablet

See Instructions, Take 1 tablet (200 MG) by oral route 4 times every day in 
combination with carbidopa and levodopa, 0 Refill(s)

Start Date: 14

Status: Ordered



Depakote 250 mg oral delayed release tablet

250 mg 1 tabs, Oral, TID, 0 Refill(s)

Start Date: 9/28/15

Status: Ordered



Depakote 500 mg oral delayed release tablet

500 mg 1 tabs, Oral, BID, 0 Refill(s)

Start Date: 9/28/15

Status: Ordered



folic acid 1 mg oral tablet

1 mg 1 tabs, Oral, Daily, 0 Refill(s)

Start Date: 9/28/15

Status: Ordered



Mirapex 1 mg oral tablet

See Instructions, TAKE ONE TABLET BY MOUTH 2 TIMES PER DAY AT 0800 AND NOON, # 
56 tabs, 1 Refill(s), eRx: [facility], TAKE ONE TABLET BY MOUTH 2 
TIMES PER DAY AT 0800 AND NOON

Start Date: 14

Status: Ordered



Paxil 40 mg oral tablet

40 mg 1 tabs, Oral, Daily, 0 Refill(s)

Start Date: 9/28/15

Status: Ordered



SEROquel 200 mg oral tablet

200 mg 1 tabs, Oral, Daily, 0 Refill(s)

Start Date: 9/28/15

Status: Ordered



Sinemet 25 mg-100 mg oral tablet

2 tabs, Oral, QID, 0 Refill(s)

Start Date: 14

Status: Ordered



Sinemet CR 50 mg-200 mg oral tablet, extended release

See Instructions, Take 1 tablet by oral route at 10 am and 6 pm., 0 Refill(s)

Start Date: 14

Status: Ordered



Tiazac 120 mg/24 hours oral capsule, extended release

See Instructions, TAKE ONE CAPSULE BY MOUTH EVERY MORNING AT 0800, # 28 caps, 0 
Refill(s), Pharmacy: [facility], TAKE ONE CAPSULE BY MOUTH EVERY 
MORNING AT 0800

Start Date: 14

Status: Ordered



trihexyphenidyl 2 mg oral tablet

1 tabs, Oral, BID, 0 Refill(s)

Start Date: 14

Status: Ordered



Results





No data available for this section



Immunizations







  



  Vaccine   Date   Refusal Reason

 

  



  tetanus/diphth/pertuss (Tdap) adult/adol   5/25/10 

 

  



  pneumococcal 23-polyvalent vaccine   5/25/10 







Procedures







   



  Procedure   Date   Related Diagnosis   Body Site

 

   



  Hernia repair     







Social History







 



  Social History Type   Response

 

 



  Smoking Status   Former smoker1







1dc 



Assessment and Plan

Extracted from:





  



  Title: Office Visit Note   Author: Jani Keenan MD   Date: 6/30/15









  Assessment/Plan

 Hypertension

 Parkinson's disease

 Schizophrenia

 Overall he seems to be stable and we will mostly continue current medications.
 In the interest of reducing his pill burden, we changed both Colace and 
tramadol to when necessary dosing. Follow-up per nursing home requirements, 
or otherwise when necessary.



 We will send copies of the dictation to [place], Dr. Hilliard, 
and Dr. Montoya.

## 2018-02-11 NOTE — XMS REPORT
Summary of Care

 Created on: 2017



LORELEI Jose

External Reference #: [id number]

: 1960

Sex: Male



Demographics







 Address  [address] [place]  [address]

 

 Preferred Language  English

 

 Marital Status  Unknown

 

 Mormon Affiliation  Unknown

 

 Race  Other Race

 

 Ethnic Group  Unknown





Author







 Author  José Miguel PARRISH,  Samantha

 

 Organization  Unknown

 

 Address  [address], [place]  [id number]



 

 Phone  Unavailable







Care Team Providers







 Care Team Member Name  Role  Phone

 

 Dionisio Sinha M.D.  Unavailable  Unavailable

 

 Jani Keenan  Unavailable  Unavailable







Functional Status







 Name  Dates  Details

 

 Functional status health issues are not documented

     Status: 









 Name  Dates  Details

 

 Cognitive status health issues are not documented

     Status: 







Problems







 Name  Dates  Details

 

 Anxiety (300.00, F41.9) 

     Status: Active

 

 Chronic venous stasis dermatitis (454.1, I87.2) 

     Status: Active

 

 Hypertension (401.9, I10) 

     Status: Active

 

 Schizophrenia (295.90, F20.9) 

     Status: Active

 

 Chronic constipation (564.00, K59.09) 

     Status: Active

 

 Vitamin deficiency (269.2, E56.9) 

     Status: Active

 

 Nocturia (788.43, R35.1) 

     Status: Active

 

 Parkinson disease, symptomatic (332.0, G20) 

     Status: Active

 

 Incomplete bladder emptying (788.21, R33.9) 

     Status: Active

 

 Increased urinary frequency (788.41, R35.0) 

     Status: Active

 

 Inflammation of bladder (595.9, N30.90) 

     Status: Active

 

 Suprapubic pain (789.09, R10.2) 

     Status: Active







Medications







 Name  Dates  Details









 Aricept 10 MG Oral Tablet

TAKE 1 TABLET DAILY AS DIRECTED.



 











Bharath M.D.Dionisio 





  Start 2017





Active

Ativan 1 MG Oral Tablet

TAKE 1 TABLET EVERY 6 TO 8 HOURS AS NEEDED NAUSEA.

* 





  Refills: 0









Cho M.D., Dionisio Sutton 





  Start 2017





Active

Colace 100 MG Oral Capsule

TAKE 1 CAPSULE TWICE DAILY AS NEEDED.

* 





  Refills: 0









Cho M.D., Dionisio Sutton 





  Start 2017





Active

Comtan 200 MG Oral Tablet

TAKE 1 TABLET 3 TIMES DAILY.

* 





  Refills: 0









Cho M.D., Dionisio Sutton 





  Start 2017





Active

Divalproex Sodium 500 MG Oral Tablet Delayed Release

TAKE 1 TABLET 3 TIMES DAILY AFTER MEALS.

* 





  Refills: 0









Cho M.D., Dionisio * 





  Start 2017





Active

Folic Acid 1 MG Oral Tablet

ONE TABLET BY MOUTH DAILY

* 





  Quantity: 90   Refills: 1









Cho M.D., Dionisio * 





  Start 2017





Active

MiraLax Oral Powder

MIX 1 CAPFUL (17GM) IN 8 OUNCES OF WATER, JUICE, OR TEA AND DRINK DAILY.

* 





  Quantity: 527   Refills: 11









Cho M.D., Dionisio * 





  Start 2017





Active

PARoxetine HCl - 40 MG Oral Tablet

TAKE 1 TABLET DAILY AS DIRECTED.

* 





  Refills: 0









Cho M.D., Dionisio * 





  Start 2017





Active

Restoril 15 MG Oral Capsule

TAKE 1 CAPSULE AT BEDTIME AS NEEDED FOR SLEEP.

* 





  Refills: 0









Cho M.D., Dionisio * 





  Start 2017





Active

Sinemet  MG Oral Tablet

Take 1 tablet daily

* 





  Refills: 0









Cho M.D., Dionisio * 





  Start 2017





Active

Sinemet CR  MG Oral Tablet Extended Release

TAKE 1 TABLET 3 TIMES DAILY.

* 





  Refills: 0









Cho M.D., Dionisio * 





  Start 2017





Active

Tiazac 120 MG Oral Capsule Extended Release 24 Hour

TAKE 1 CAPSULE EVERY MORNING DAILY.

* 





  Refills: 0









Cho M.D., Dionisio Sutton 





  Start 2017





Active

TraMADol HCl - 50 MG Oral Tablet

TAKE 1 TABLET EVERY 6 HOURS AS NEEDED FOR PAIN.

* 





  Refills: 0









Cho M.D., Dionisio * 





  Start 2017





Active

DilTIAZem  MG Oral Capsule Extended Release 24 Hour

TAKE 1 CAPSULE ONCE DAILY.

* 





  Refills: 0









Cho M.D., Dionisio * 





  Start 2017





Active

Tylenol 325 MG Oral Tablet

TAKE 1 TO 2 TABLETS EVERY 4 HOURS AS NEEDED

* 





  Refills: 0









Cho M.D., Dionisio  





  Start 2017





Active





Allergies and Adverse Reactions







 Name  Dates  Details

 

 No Known Drug Allergies (Allergy)     Status: Active









Procedures







 Procedure  Dates  Details

 

 History of Hernia Repair      

 

 History of Cystoscopy With Biopsy      

 

 Procedures not documented      







Immunization







 Name  Dates  Details

 

 Immunizations not documented

      







Family History







 Name  Dates  Details

 

 Family history of cerebrovascular accident (CVA) (V17.1, Z82.3) 

     Status: Active









 Name  Dates  Details

 

 Family history of cardiac disorder (V17.49, Z82.49) 

     Status: Active

 

 Family history of Coronary artery disease (414.00, I25.10) 

     Status: Active









 Name  Dates  Details

 

 Family history of hypertension (V17.49, Z82.49) 

     Status: Active

 

 Family history of malignant neoplasm of breast (V16.3, Z80.3) 

     Status: Active

 

 Family history of diabetes mellitus (V18.0, Z83.3) 

     Status: Active









 Name  Dates  Details

 

 Family history of hypertension (V17.49, Z82.49) 

     Status: Active

 

 Family history of depression (V17.0, Z81.8) 

     Status: Active

 

 Family history of malignant neoplasm of prostate (V16.42, Z80.42) 

     Status: Active









 Name  Dates  Details

 

 Family history of malignant neoplasm of uterus (V16.49, Z80.49) 

     Status: Active







Social History







 Name  Dates  Details

 

 -

     Status: 









 Name  Dates  Details

 

 Former smoker      







Vital Signs







 Date  Test  Result  Details

 

           

 

   No Known Vitals to report      







Results







 Date  Description  Value  Details

 

   Results not documented      

 

           







Plan of Care







 Name  Dates  Details

 

 Planned Observations 

 

 Planned Goals not documented      







Instructions







 Name  Dates  Details

 

 Instructions not documented

      







Encounters







 Appointment; Dionisio Sinha M.D. 

Encounter Diagnosis: Problem not documented

  On 20-Mar-2017

 14:30

 

 Appointment; Dionisio Sinha M.D. 

Encounter Diagnosis: Problem not documented

  On 2017

 13:15

 

 Appointment; Dionisio Sinha M.D. 

Encounter Diagnosis: Problem not documented

  On 2017

 13:15

## 2018-02-11 NOTE — XMS REPORT
Transfer Level of Care 2016 11:30

 Created on: 2016



MARCIA HENDERSON

External Reference #: [id number]

: 1960

Sex: Male



Demographics







 Address  [address] [place]  [address]

 

 Home Phone  [phone]

 

 Preferred Language  English

 

 Marital Status  Unknown

 

 Hindu Affiliation  Unknown

 

 Race  White

 

 Ethnic Group  Unknown





Author







 Author  GENERATED,  SYSTEM

 

 Organization  Unknown

 

 Address  Unknown

 

 Phone  Unavailable







Care Team Providers







 Care Team Member Name  Role  Phone

 

  PP  Unavailable







Reason For Visit







Chief Complaint

Z79.899



Social History







Functional Status







Vital Signs







Results





Problems

Encounter Diagnosis





No relevant problems exist.



Encounters

Encounter Diagnosis

No relevant problems exist.



Plan of Care







Procedures





No relevant procedures performed.



Immunizations





No immunizations administered or ordered.



Hospital Course





Hospital Discharge Instructions







Allergies, Adverse Reactions, Alerts

* Latex Allergy has not been assessed.

* IV Contrast Allergy has not been assessed.





Medication





Medication reconciliation has not been performed.

## 2018-02-11 NOTE — XMS REPORT
Continuity of Care Document

 Created on: 2018



ROMULO HENDERSON

External Reference #: [id number]

: 1960

Sex: Male



Demographics







 Address  [facility] [address]

[place], [place]  [id number]

 

 Home Phone  [phone] x

 

 Preferred Language  Unknown

 

 Marital Status  Unknown

 

 Druze Affiliation  Unknown

 

 Race  Unknown

 

 Ethnic Group  Unknown





Author







 Author  [facility]

 

 Organization  [facility]

 

 Address  Unknown

 

 Phone  Unavailable



              



Allergies

      





 Active            Description            Code            Type            
Severity            Reaction            Onset            Reported/Identified   
         Relationship to Patient            Clinical Status        

 

 Yes            No Known Medication Allergies                         [facility]MA     
       N/A            N/A                         2014                   
               



                  



Medications

      



There is no data.                  



Problems

      





 Date Dx Coded            Attending            Type            Code            
Diagnosis            Diagnosed By        

 

 2016            Madai Keenan            Final            F20.9    
        Schizophrenia, unspecified                     

 

 2016            Madai Keenan            Final            G20      
      Parkinson''s disease                     

 

 2016            Madai Keenan            Final            I83.10   
         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       
              

 

 2016            Madai Keenan            Final            L03.115  
          Cellulitis of right lower limb                     

 

 2016            Madai Keenan            Final            F20.9    
        Schizophrenia, unspecified                     

 

 2016            Madai Keenan            Final            G20      
      Parkinson''s disease                     

 

 2016            Madai Keenan            Final            L89.322  
          Pressure ulcer of left buttock, stage 2                     

 

 2016            MADAI KEENAN            I872         
   Venous insufficiency (chronic) (peripheral)                     

 

 2016            MADAI KEENAN            R600         
   Localized edema                     

 

 2016            Madai Keenan            Final            F20.9    
        Schizophrenia, unspecified                     

 

 2016            Madai Keenan            Final            G20      
      Parkinson''s disease                     

 

 2016            Madai Keenan            Final            K59.09   
         Other constipation                     

 

 2016            Madai Keenan            Final            R35.0    
        Frequency of micturition                     

 

 2017            Madai Keenan            Final            F20.9    
        Schizophrenia, unspecified                     

 

 2017            Madai Keenan            Final            F41.1    
        Generalized anxiety disorder                     

 

 2017            Madai Keenan            Final            G20      
      Parkinson''s disease                     

 

 2017            Madai Keenan V            Final            I10      
      Essential (primary) hypertension                     

 

 2017            Madai Keenan            Final            L81.9    
        Disorder of pigmentation, unspecified                     

 

 2017            Madai Keenan            Final            Z51.81   
         Encounter for therapeutic drug level monitoring                     

 

 2017            Madai Keenan            Final            F20.9    
        Schizophrenia, unspecified                     

 

 2017            Madai Keenan            Final            G20      
      Parkinson''s disease                     

 

 2017            Madai Keenan            Final            S63.502A 
           Unspecified sprain of left wrist, initial encounter                 
    

 

 2017            Madai Keenan            Final            F03.90   
         Unspecified dementia without behavioral disturbance                   
  

 

 2017            MOOSE FERRARO            D696            
Thrombocytopenia, unspecified                     

 

 2017            MOOSE FERRARO            E870            
Hyperosmolality and hypernatremia                     

 

 2017            MOOSE FERRARO                        
Dementia in oth diseases classd elswhr w/o behavrl disturb                     

 

 2017            MOOSE FERRARO            F209            
Schizophrenia, unspecified                     

 

 2017            MOOSE FERRARO            F329            
Major depressive disorder, single episode, unspecified                     

 

 2017            MOOSE FERRARO            F419            
Anxiety disorder, unspecified                     

 

 2017            MOOSE FERRARO            F919            
Conduct disorder, unspecified                     

 

 2017            MOOSE FERRARO            G20            
Parkinson's disease                     

 

 2017            MOOSE FERRARO            I119            
Hypertensive heart disease without heart failure                     

 

 2017            MOOSE FERRARO            J123            
Human metapneumovirus pneumonia                     

 

 2017            MOOSE FERRARO            J159            
Unspecified bacterial pneumonia                     

 

 2017            MOOSE FERRARO            Z23            
Encounter for immunization                     

 

 2017            MOOSE FERRARO            D696            
Thrombocytopenia, unspecified                     

 

 2017            MOOSE FERRARO            E870            
Hyperosmolality and hypernatremia                     

 

 2017            MOOSE FERRARO                        
Dementia in oth diseases classd elswhr w/o behavrl disturb                     

 

 2017            MOOSE FERRARO            F209            
Schizophrenia, unspecified                     

 

 2017            MOOSE FERRARO            F329            
Major depressive disorder, single episode, unspecified                     

 

 2017            MOOSE FERRARO            F419            
Anxiety disorder, unspecified                     

 

 2017            MOOSE FERRARO            F919            
Conduct disorder, unspecified                     

 

 2017            MOOSE FERRARO            G20            
Parkinson's disease                     

 

 2017            MOOSE FERRARO            I119            
Hypertensive heart disease without heart failure                     

 

 2017            MOOSE FERRARO            J123            
Human metapneumovirus pneumonia                     

 

 2017            MOOSE FERRARO            J159            
Unspecified bacterial pneumonia                     

 

 2017            MOOSE FERRARO            Z23            
Encounter for immunization                     



                                                                               
                               



Procedures

      





 Code            Description            Performed By            Performed On   
     

 

             70589                                  Office or other outpatient 
visit for the evaluation and management of an established patient, which 
requires at least 2 of these 3 key components: A detailed history; A detailed 
examination; Medical d                                   [id number]                                  Office or other outpatient 
visit for the evaluation and management of an established patient, which 
requires at least 2 of these 3 key components: An expanded problem focused 
history; An expanded prob                                   [id number]                                  Office or other outpatient 
visit for the evaluation and management of an established patient, which 
requires at least 2 of these 3 key components: An expanded problem focused 
history; An expanded prob                                   2016        

 

             37211                                  Excision, benign lesion 
including margins, except skin tag (unless listed elsewhere), trunk, arms or 
legs; excised diameter 1.1 to 2.0 cm                                   2017        

 

             89552                                  Level IV - Surgical 
pathology, gross and microscopic examination  - spontaneous/missed 
Artery, biopsy Bone marrow, biopsy Bone exostosis Brain/meninges, other than 
for tumor resection Breast,                                   [id number]                                  Office or other outpatient 
visit for the evaluation and management of an established patient, which 
requires at least 2 of these 3 key components: A detailed history; A detailed 
examination; Medical d                                   2017        

 

             78940                                  Radiologic examination, 
wrist; complete, minimum of 3 views                                   [id number]                                  Office or other outpatient 
visit for the evaluation and management of an established patient, which 
requires at least 2 of these 3 key components: A detailed history; A detailed 
examination; Medical d                                   2017        

 

             [id number]                                                           
          2017        



                                  



Results

      





 Test            Result            Range        









 URINALYSIS (CULTURE PRN) - 16 16:05         









 *URINE APPEARANCE            CLEAR             CLEAR        

 

 *URINE BILIRUBIN            NEGATIVE             NEGATIVE        

 

 *URINE BLOOD            NEGATIVE             NEGATIVE        

 

 *URINE GLUCOSE            NEGATIVE             NEGATIVE        

 

 *URINE KETONES            TRACE             NEGATIVE        

 

 *URINE LEUKOCYTES            NEGATIVE             NEGATIVE        

 

 *URINE NITRITES            NEGATIVE             NEGATIVE        

 

 URINE PH            6.0             5.0-8.0        

 

 *URINE PROTEIN            NEGATIVE             NEGATIVE        

 

 URINE SPECIFIC GRAVITY            >1.030             <=1.005->=1.030        

 

 *URINE UROBILINOGEN            0.2             0.2-1.0        

 

 *URINE COLOR            YELLOW             STRAW/YELL/DK YELL        









 URINALYSIS (CULTURE PRN) - 16 15:30         









 *URINE APPEARANCE            CLOUDY             CLEAR        

 

 *URINE BILIRUBIN            NEGATIVE             NEGATIVE        

 

 *URINE BLOOD            NEGATIVE             NEGATIVE        

 

 *URINE GLUCOSE            NEGATIVE             NEGATIVE        

 

 *URINE KETONES            15             NEGATIVE        

 

 *URINE LEUKOCYTES            NEGATIVE             NEGATIVE        

 

 *URINE NITRITES            NEGATIVE             NEGATIVE        

 

 URINE PH            7.5             5.0-8.0        

 

 *URINE PROTEIN            NEGATIVE             NEGATIVE        

 

 URINE SPECIFIC GRAVITY            1.020             <=1.005->=1.030        

 

 *URINE UROBILINOGEN            1.0             0.2-1.0        

 

 *URINE COLOR            YELLOW             STRAW/YELL/DK YELL        









 CULTURE URINE - 16 15:30         









 CULTURE URINE            No growth at 48 hrs             NRG        









 URINALYSIS, AUTOMATED WITH MICROSCOPY - 17 10:20         









 *URINE APPEARANCE            CLEAR             CLEAR        

 

 *URINE BILIRUBIN            NEGATIVE             NEGATIVE        

 

 *URINE BLOOD            NEGATIVE             NEGATIVE        

 

 *URINE GLUCOSE            NEGATIVE             NEGATIVE        

 

 *URINE KETONES            TRACE             NEGATIVE        

 

 *URINE LEUKOCYTES            NEGATIVE             NEGATIVE        

 

 *URINE NITRITES            NEGATIVE             NEGATIVE        

 

 URINE PH            6.0             5.0-8.0        

 

 *URINE PROTEIN            NEGATIVE             NEGATIVE        

 

 URINE SPECIFIC GRAVITY            >1.030             <=1.005->=1.030        

 

 *URINE UROBILINOGEN            1.0             0.2-1.0        

 

 *URINE COLOR            STRAW             STRAW/YELL/DK YELL        









 VALPROIC ACID - 17 08:54         









 VALPROIC ACID            46                     









 LIPID PANEL W/O REFLEX - 17 14:45         









 TRIGLYCERIDES            73             0-149        

 

 CHOLESTEROL            140                     

 

 HDL CHOLESTEROL            49             40-60        

 

 LDL (CALCULATED CHOL            76             0-99        









 CBC WITH PLATELET AND DIFFERENTIAL - 17 22:00         









 SEGS            80.5 %            NRG        

 

 *BASOPHILS            0.7 %            NRG        

 

 *EOSINOPHILS            0.0 %            NRG        

 

 AUTOMATED DIFF            PERFORMED             NRG        

 

 *LYMPHOCYTES            5.8 %            NRG        

 

 *MONOCYTES            13.0 %            NRG        

 

 *ABSOLUTE BASOPHILS            0.10 10*3/uL            0.00-0.20        

 

 *ABSOLUTE EOSINOPHILS            0.00 10*3/uL            0.00-0.50        

 

 *ABSOLUTE LYMPHOCYTES            0.40 10*3/uL            1.00-3.00        

 

 *ABSOLUTE MONOCYTES            1.00 10*3/uL            0.30-1.00        

 

 *ABSOLUTE NEUTROPHILS            6.00 10*3/uL            1.80-7.80        

 

 MPV            9.7 fL            7.4-10.4        

 

 PLATELETS            145 10*3/uL            159-386        

 

 WBC            7.4 10*3/uL            3.6-11.2        

 

 RBC            4.38             4.06-5.63        

 

 HEMOGLOBIN            13.8             12.5-16.3        

 

 HEMATOCRIT            40.0 %            36.7-47.1        

 

 MCV            91.4 fL            80.0-100.0        

 

 MCH            31.5 pg            27.0-33.0        

 

 MCHC            34.5             32.0-36.0        

 

 RDW            13.8 %            12.3-17.0        

 

 RDWSD            44.2             37.1-47.8        









 COMPREHENSIVE METABOLIC PANEL - 17 22:00         









 BILIFUBIN TOTAL            0.40             0.20-1.00        

 

 TOTAL PROTEIN            6.8             6.4-8.2        

 

 ALBUMIN            3.5             3.4-5.0        

 

 *GLOBULIN            3.3             2.3-3.5        

 

 *A/G RATIO            1.1             1.5-2.2        

 

 ALK PHOS            59 U/L                    

 

 ALT (SGPT)            9 U/L            14-59        

 

 AST (SGOT)            21 U/L            15-37        









 GFR ESTIMATION - 17 22:00         









 *GFR EST NON AFR AMERICAN            >90 mL/min            NRG        

 

 *GRFA EST AFR AMER            >90 mL/min            NRG        









 DRUG SCREEN PLASMA - 17 22:00         









 ALCOHOL            <0.003             NRG        

 

 ACETAMINOPHEN            <2             10-30        

 

 SALICYLATE            1.6             2.8-20.0        









 TSH - 17 22:00         









 TSH            0.758 u[IU]/L            0.340-4.820        









 MAGNESIUM - 17 22:00         









 MAGNESIUM            1.7             1.8-2.4        









 LIPASE - 17 22:00         









 LIPASE            131 U/L                    









 URINALYSIS (CULTURE PRN) - 17 00:25         









 *URINE APPEARANCE            CLEAR             CLEAR        

 

 *URINE BILIRUBIN            NEGATIVE             NEGATIVE        

 

 *URINE BLOOD            TRACE-INTACT             NEGATIVE        

 

 *URINE GLUCOSE            NEGATIVE             NEGATIVE        

 

 *URINE KETONES            TRACE             NEGATIVE        

 

 *URINE LEUKOCYTES            NEGATIVE             NEGATIVE        

 

 *URINE NITRITES            NEGATIVE             NEGATIVE        

 

 URINE PH            7.0             5.0-8.0        

 

 *URINE PROTEIN            NEGATIVE             NEGATIVE        

 

 URINE SPECIFIC GRAVITY            1.015             <=1.005->=1.030        

 

 *URINE UROBILINOGEN            0.2             0.2-1.0        

 

 *URINE COLOR            YELLOW             STRAW/YELL/DK YELL        









 URINE MICROSCOPIC - 17 00:25         









 WBC            0-1 /[HPF]            0-5        

 

 RBC            0-1 /[HPF]            0-1        

 

 MUCOUS THREADS            FEW /[LPF]            NEGATIVE        

 

 MICROSCOPIC EXAM PERFORMED            PERFORMED             [facility]        









 LACTIC ACID - 17 00:40         









 LACTIC ACID            0.8             0.9-1.7        









 CULTURE BLOOD - 17 00:40         









 CULTURE BLOOD            No growth after 5 days of incubation.             NRG
        









 CULTURE BLOOD - 17 00:52         









 CULTURE BLOOD            No growth after 5 days of incubation.             NRG
        









 GFR ESTIMATION - 17 07:06         









 *GFR EST NON AFR AMERICAN            >90 mL/min            NRG        

 

 *GRFA EST AFR AMER            >90 mL/min            NRG        









 RENAL FUNCTION PANEL - 17 07:06         









 SODIUM            134 mmol/L            136-145        

 

 POTASSIUM            3.9 mmol/L            3.5-5.1        

 

 CHLORIDE            98 mmol/L                    

 

 TCO2            25.7 mmol/L            21.0-32.0        

 

 *ANION GAP            10.3 mmol/L            8.0-16.0        

 

 BUN            14             7-18        

 

 CREATININE            0.73             0.70-1.30        

 

 *BUN/CREATININE RATIO            19.2             9.1-17.0        

 

 GLUCOSE            114             65-99        

 

 CALCIUM            8.6             8.5-10.1        

 

 ALBUMIN            3.2             3.4-5.0        

 

 PHOSPHORUS            2.4             2.6-4.7        









 MAGNESIUM - 17 07:06         









 MAGNESIUM            1.8             1.8-2.4        









 ARTERIAL BLOOD GAS - 17 16:39         









 *ART. BASE EXCESS            2.4             -2.5-2.5        

 

 *ART. BICARBONATE            24.8 meq/L            19.0-29.0        

 

 ART. O2 SATURATION            92.5 %            91.0-97.0        

 

 *ART. PO2            63 mm[Hg]            80-95        

 

 *ART. TOTAL CO2            21.3             20.0-30.0        

 

 *ARTERIAL PC02            33.1 mm[Hg]            35.0-45.0        

 

 *ARTERIAL PH            7.487             7.350-7.450        









 PROCALCITONIN - 17 13:39         









 PROCALCITONIN            0.49 ng/mL            0.05-0.50        









 CBC WITH PLATELET NO DIFFERENTIAL - 17 06:07         









 MPV            9.1 fL            7.4-10.4        

 

 PLATELETS            142 10*3/uL            159-386        

 

 WBC            11.4 10*3/uL            3.6-11.2        

 

 RBC            4.77             4.06-5.63        

 

 HEMOGLOBIN            15.1             12.5-16.3        

 

 HEMATOCRIT            44.2 %            36.7-47.1        

 

 MCV            92.5 fL            80.0-100.0        

 

 MCH            31.6 pg            27.0-33.0        

 

 MCHC            34.1             32.0-36.0        

 

 RDW            13.8 %            12.3-17.0        

 

 RDWSD            44.2             37.1-47.8        









 BASIC METABOLIC PANEL - 17 06:07         









 SODIUM            138 mmol/L            136-145        

 

 POTASSIUM            4.3 mmol/L            3.5-5.1        

 

 CHLORIDE            101 mmol/L                    

 

 TCO2            28.2 mmol/L            21.0-32.0        

 

 *ANION GAP            8.8 mmol/L            8.0-16.0        

 

 BUN            21             7-18        

 

 CREATININE            0.83             0.70-1.30        

 

 *BUN/CREATININE RATIO            25.3             9.1-17.0        

 

 GLUCOSE            113             65-99        

 

 CALCIUM            9.1             8.5-10.1        









 MAGNESIUM - 17 06:07         









 MAGNESIUM            2.1             1.8-2.4        









 GFR ESTIMATION - 17 06:07         









 *GFR EST NON AFR AMERICAN            >90 mL/min            NRG        

 

 *GRFA EST AFR AMER            >90 mL/min            NRG        









 LEGIONELLA PNEUMO URINE AG - 17 13:10         









 LEGIONELLA PNEUMO URINE AG            Negative             Negative        









 CBC WITH PLATELET AND DIFFERENTIAL - 17 05:33         









 SEGS            85.5 %            NRG        

 

 *BASOPHILS            0.1 %            NRG        

 

 *EOSINOPHILS            0.1 %            NRG        

 

 AUTOMATED DIFF            PERFORMED             NRG        

 

 *LYMPHOCYTES            5.4 %            NRG        

 

 *MONOCYTES            8.9 %            NRG        

 

 *ABSOLUTE BASOPHILS            0.00 10*3/uL            0.00-0.20        

 

 *ABSOLUTE EOSINOPHILS            0.00 10*3/uL            0.00-0.50        

 

 *ABSOLUTE LYMPHOCYTES            0.30 10*3/uL            1.00-3.00        

 

 *ABSOLUTE MONOCYTES            0.50 10*3/uL            0.30-1.00        

 

 *ABSOLUTE NEUTROPHILS            5.10 10*3/uL            1.80-7.80        

 

 MPV            9.7 fL            7.4-10.4        

 

 PLATELETS            147 10*3/uL            159-386        

 

 WBC            5.9 10*3/uL            3.6-11.2        

 

 RBC            4.43             4.06-5.63        

 

 HEMOGLOBIN            13.9             12.5-16.3        

 

 HEMATOCRIT            41.0 %            36.7-47.1        

 

 MCV            92.5 fL            80.0-100.0        

 

 MCH            31.5 pg            27.0-33.0        

 

 MCHC            34.0             32.0-36.0        

 

 RDW            13.9 %            12.3-17.0        

 

 RDWSD            45.1             37.1-47.8        









 COMPREHENSIVE METABOLIC PANEL - 17 13:31         









 SODIUM            137 mmol/L            136-145        

 

 POTASSIUM            3.6 mmol/L            3.5-5.1        

 

 CHLORIDE            102 mmol/L                    

 

 TCO2            25.2 mmol/L            21.0-32.0        

 

 *ANION GAP            9.8 mmol/L            8.0-16.0        

 

 BUN            15             7-18        

 

 CREATININE            0.69             0.70-1.30        

 

 *BUN/CREATININE RATIO            21.7             9.1-17.0        

 

 GLUCOSE            132             65-99        

 

 CALCIUM            8.8             8.5-10.1        

 

 BILIFUBIN TOTAL            0.30             0.20-1.00        

 

 TOTAL PROTEIN            6.7             6.4-8.2        

 

 ALBUMIN            2.6             3.4-5.0        

 

 *GLOBULIN            4.1             2.3-3.5        

 

 *A/G RATIO            0.6             1.5-2.2        

 

 ALK PHOS            45 U/L                    

 

 ALT (SGPT)            48 U/L            14-59        

 

 AST (SGOT)            59 U/L            15-37        









 GFR ESTIMATION - 17 13:31         









 *GFR EST NON AFR AMERICAN            >90 mL/min            NRG        

 

 *GRFA EST AFR AMER            >90 mL/min            NRG        









 CBC WITH PLATELET NO DIFFERENTIAL - 17 13:32         









 MPV            8.7 fL            7.4-10.4        

 

 PLATELETS            287 10*3/uL            159-386        

 

 WBC            8.5 10*3/uL            3.6-11.2        

 

 RBC            4.62             4.06-5.63        

 

 HEMOGLOBIN            14.7             12.5-16.3        

 

 HEMATOCRIT            42.7 %            36.7-47.1        

 

 MCV            92.4 fL            80.0-100.0        

 

 MCH            31.7 pg            27.0-33.0        

 

 MCHC            34.3             32.0-36.0        

 

 RDW            14.0 %            12.3-17.0        

 

 RDWSD            45.5             37.1-47.8        



                                                                              



Encounters

      





 ACCT No.            Visit Date/Time            Discharge            Status    
        Pt. Type            Provider            Facility            Loc./Unit  
          Complaint        

 

 [id number]            2017 16:03:00            2017 23:59:00    
        DIS            Outpatient            Madai Keenan            Via 
[facility]            6 MO RCK        

 

 [id number]            2017 10:30:00            2017 23:59:00    
        DIS            Outpatient            GoMadai rubi            Via 
[facility]            3 MO NH RCK        

 

 [id number]            2016 10:24:00            2016 23:59:00    
        DIS            Outpatient            GoMadai rubi            Via 
[facility]            [facility]        

 

 [id number]            2016 10:07:00            2016 23:59:00    
        DIS            Outpatient            GoMadai rubi            Via 
[facility]            TCPA wound on left buttock 
history of MRSA        

 

 [id number]            2016 09:09:00            2016 23:59:00    
        DIS            Outpatient            GoMadai rubi            Via 
[facility]            60 day follow up from 4-19     
   

 

 [id number]            2016 14:45:00            2016 23:59:00    
        DIS            Outpatient            Madai Keenan            Via 
[facility]            sore in his croxsis        

 

 [id number]            2016 10:01:00            2016 23:59:00    
        DIS            Outpatient            David Lay            
[facility]ia [facility]            lower leg adema        

 

 [id number]            2016 10:42:00            2016 23:59:00    
        DIS            Outpatient            David Lay            
[facility]ia [facility]            2 week follow up post 
hospital        

 

 [id number]            2015 10:05:00            2015 23:59:00    
        DIS            Outpatient            GoMadai rubi            Via 
[facility]            SOV        

 

 [id number]            2015 10:01:00            2015 23:59:00    
        DIS            Outpatient            GoMadai rubi            Via 
[facility]            3MTH RCK [facility]        

 

 [id number]            2016 13:10:00                                   
   Document Registration                                                       
     

 

 [id number]            2017 08:37:00            2017 03:30:00     
       DIS            Outpatient            PREMMADAI LOIS                 
                     F25.9, F06.0,        

 

 [id number]            2017 12:04:00            2017 12:05:41     
       DIS            Outpatient            MADAI KEENAN                 
                     UA        

 

 [id number]            2016 15:24:00            2016 15:25:14     
       DIS            Outpatient            MADAI KEENAN                 
                     UA        

 

 [id number]            2016 09:55:00            2016 09:59:27     
       DIS            Outpatient            ESTEVAN KEENANALL LOIS                 
                              

 

 [id number]            10/10/2016 14:12:00            10/10/2016 23:59:59     
       CLS            Outpatient            CARSON HUNTER                     
                 Z79.899        

 

 [id number]            2016 00:01:00            2016 08:44:16     
       DIS            Outpatient            SHARLENE TREVIZO                  
                    [facility]        

 

 [id number]            2016 10:25:00            2016 10:44:36     
       DIS            Outpatient            MADAI KEENAN                 
                     <PV2.3.2>Localized edema</PV2.3.2><PV2.3.2>16 LE EDEMA
</PV2.3.2><PV2.3.2>Localized edema</PV2.3.2><PV2.3.2>Venous insufficiency (
chronic) (peripheral)</PV2.3.2>        

 

 [id number]            09/10/2016 08:30:00            2016 03:30:00     
       DIS            Outpatient            ABIGAIL ACEVES                         
             Z79.899        

 

 [id number]            2016 10:21:00            2016 00:14:54     
       DIS            Outpatient            SHARLENE TREVIZO                  
                    [facility]        

 

 [id number]            2017 19:45:00            2017 16:39:49     
       DIS            Outpatient            UNASSIGNED DOCTOR, DOCTOR          
                            CONFUSION/AGITATION        

 

 [id number]            2017 03:51:00            2017 18:13:23     
       DIS            Inpatient            MOOSE FERRARO                       
               <PV2.3.2>Human metapneumovirus pneumonia</PV2.3.2><PV2.3.2>PNA, 
SEPSIS, SCHIZOPHRENIA</PV2.3.2><PV2.3.2>Human metapneumovirus pneumonia</PV2.3.2
><PV2.3.2>Hyperosmolality and hypernatremia</PV2.3.2><PV2.3.2>Unspecified 
bacterial pneumonia</PV2.3.2><PV2.3.2>Parkinson's disease</PV2.3.2><PV2.3.2>
Dementia in oth diseases classd elswhr w/o behavrl disturb</PV2.3.2><PV2.3.2>
Hypertensive heart disease without heart failure</PV2.3.2><PV2.3.2>Schizophrenia
, unspecified</PV2.3.2><PV2.3.2>Anxiety disorder, unspecified</PV2.3.2><PV2.3.2>
Major depressive disorder, single episode, unspecified</PV2.3.2><PV2.3.2>
Conduct disorder, unspecified</PV2.3.2><PV2.3.2>Encounter for immunization</
PV2.3.2><PV2.3.2>Thrombocytopenia, unspecified</PV2.3.2>        

 

 [id number]            2017 14:28:00            2017 03:30:00     
       DIS            Outpatient            CARSON HUNTER                     
                 <PV2.3.2>G20, S81.809D, K59.00, F41.9, E56.9,</PV2.3.2><PV2.3.2
>G47.00, F32.9, I10, F25.9, F06.0</PV2.3.2>

## 2018-02-11 NOTE — XMS REPORT
[facility], [facility].

 Created on: 2016



Bob Jose

External Reference #: [id number]

: 1960

Sex: Male



Demographics







 Address  [address] [place]  [address]

 

 Home Phone  [phone]

 

 Preferred Language  Unknown

 

 Marital Status  Unknown

 

 Anglican Affiliation  Unknown

 

 Race  White

 

 Ethnic Group  Not  or 





Author







 Author  Taty New

 

 Organization  [employer]

 

 Address  Unknown

 

 Phone  Unavailable







Care Team Providers







 Care Team Member Name  Role  Phone

 

 Taty New  CP  Unavailable



                                                                



Allergies

          No Known Allergies                                                   
                                     



Problems

          





 Problem Type  Condition  Code  Onset Dates  Condition Status

 

 Problem  Parkinson's disease  G20     Active

 

 Problem  Schizoaffective disorder, bipolar type  F25.0     Active



                                                                               
                   



Medications

          No Known Medications                                                 
                             



Results

          No Known Results                                                     
               



Summary Purpose

          [employer] Submission

## 2018-02-11 NOTE — XMS REPORT
[facility], [facility].

 Created on: 2016



DamonBob

External Reference #: [id number]

: 1960

Sex: Male



Demographics







 Address  [address] [place]  [address]

 

 Home Phone  [phone]

 

 Preferred Language  Unknown

 

 Marital Status  Unknown

 

 Pentecostal Affiliation  Unknown

 

 Race  White

 

 Ethnic Group  Not  or 





Author







 Taty Bartholomew

 

 [facility]  [employer]

 

 Address  Unknown

 

 Phone  Unavailable







Care Team Providers







 Care Team Member Name  Role  Phone

 

 Taty New  CP  Unavailable



                                                                



Allergies

          No Known Allergies                                                   
                                     



Problems

          





 Problem Type  Condition  Code  Onset Dates  Condition Status

 

 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

 

 Problem  Parkinson's disease  G20     Active



                                                                               
                   



Medications

          





 Medication  Code System  Code  Instructions  Start Date  End Date  Status  
Dosage

 

 Seroquel  NDC  [address]-10  200 MG Orally Once a day  2016        1 
tablet at bedtime



                                                                              



Results

          No Known Results                                                     
               



Summary Purpose

          [employer] Submission

## 2018-02-11 NOTE — PHYSICAL THERAPY EVALUATION
PT Evaluation-General


Medical Diagnosis


Admission Date


Feb 9, 2018 at 20:41


Medical Diagnosis:  left hip fracture


Onset Date:  Feb 10, 2018





Therapy Diagnosis


Therapy Diagnosis:  weakness





Height/Weight


Height (Feet):  5


Height (Inches):  8.00


Weight (Pounds):  183


Weight (Ounces):  3.0





Precautions


Precautions/Isolations:  Fall Prevention





Weight Bear Status


Right Lower Extremity:  Right


Full Weight Bearing


Left Lower Extremity:  Left


Weight Bearing/Tolerated





Referral


Physician:  José Miguel


Reason for Referral:  Evaluation/Treatment


Referral Comments


wBAT left; anterior hip precautions





Medical History


Additional Medical History


anxiety, schizophrenia, depression


Current History


Pt sustained a fall at [facility].  It was found that he had a 

left hip fracture.  Post repair Uncemented bipolar hemiarthroplasty Left hip


Reviewed History:  Yes





Social History


Home:  Nursing Home (recent admit)





Prior/Core FIM


Prior Level of Function


              Functional Chambers Measure


0=Not Assessed/NA   4=Minimal Assistance


1=Total Assistance   5=Supervision or Setup


2=Maximal Assistance   6=Modified Chambers


3=Moderate Assistance   7=Complete Chambers


Phoned the NH from which he was admitted.  He was a new resident there and the 

nurse indicated that he fell within the first 30 minutes of arrival to the 

facility.  She is unsure of his exact prior mobility level but notes his family 

reported he was able to ambulate with assist.





PT Evaluation-Current


Subjective


Eyes closed through most of treatment.  no verbalization





Objective


Patient Orientation:  Unable to Assess


Problem Solving:  Poor


Attachments:  Andrew Catheter, IV





ROM/Strength


ROM Lower Extremities


WFL--stiff through all ranges


Strength Lower Extremities


unable to test





Integumentary/Posture


Integumentary


refer to nursing notes


Bladder Incontinence:  Andrew Cath


Posture


unable to assess





Neuromuscular


(Tone, Coordination, Reflexes)


unable to assess





Transfers


              Functional Chambers Measure


0=Not Assessed/NA   4=Minimal Assistance


1=Total Assistance   5=Supervision or Setup


2=Maximal Assistance   6=Modified Chambers


3=Moderate Assistance   7=Complete Chambers


Transfers (B, C, W/C) (FIM):  1


Dependent of 2 to transfer sup to/from sit EOB.  Pt sat EOB x 15 minutes with 

max assist.  While sitting, pt ate his lunch with full assist to feed him.  


Unsafe to attempt standing or transfer to the chair.  


Pt in bed post treatment with SCD's in place, and needs met.





Gait


Comments/Gait Description


unable  to test





Balance


Sitting Static:  Poor


Sitting Dynamic:  Poor





Treatment


Sat EOB to eat; max assist to sit with heavy lean to his right.  Dep for 

feeding.





Assessment/Needs


Post repair of left hip fracture.  Limited ability to communicate or actively 

participate with skilled therapy.  Will beneift from PT to follow to address 

and define best transfer technique.


Rehab Potential:  Fair





PT Long Term Goals


Long Term Goals


PT Long Term Goals Time Frame:  Feb 16, 2018


Transfers (B,C,W/C) (FIM):  3





PT Plan


Problem List


Problem List:  Activity Tolerance, Functional Strength, Safety, Balance, Gait, 

Transfer, Bed Mobility





Treatment/Plan


Treatment Plan:  Continue Plan of Care


Treatment Plan:  Bed Mobility, Education, Functional Activity Uriel, Functional 

Strength, Gait, Safety, Therapeutic Exercise, Transfers


Treatment Duration:  Feb 16, 2018


Frequency:  6 times per week


Estimated Hrs Per Day:  .5 hour per day


Patient and/or Family Agrees t:  Yes





Safety Risks/Education


Spoke with pt throughout treatment regarding the need for therapy and 

participation; pt does not seem to understand





Discharge Recommendations


Therapy D/C Recommendations:  Skilled Nursing (TCU/NH)





Time/GCodes


Time In:  1130


Time Out:  1155


Total Billed Treatment Time:  25


Total Billed Treatment


visit


AYLA MABRY PT Feb 11, 2018 12:02

## 2018-02-11 NOTE — XMS REPORT
Transfer Level of Care 2016 08:16

 Created on: 2016



MARCIA HENDERSON

External Reference #: [id number]

: 1960

Sex: Male



Demographics







 Address  [address] [place]  [address]

 

 Home Phone  [phone]

 

 Preferred Language  English

 

 Marital Status  Unknown

 

 Scientologist Affiliation  Unknown

 

 Race  White

 

 Ethnic Group  Unknown





Author







 Author  GENERATED,  SYSTEM

 

 Organization  Unknown

 

 Address  Unknown

 

 Phone  Unavailable







Care Team Providers







 Care Team Member Name  Role  Phone

 

  PP  Unavailable







Reason For Visit







Chief Complaint

BILAT LEGS



Social History







Functional Status







Vital Signs







Results





Problems

Encounter Diagnosis





No relevant problems exist.



Encounters

Encounter Diagnosis

No relevant problems exist.



Plan of Care







Procedures





No relevant procedures performed.



Immunizations





No immunizations administered or ordered.



Hospital Course





Hospital Discharge Instructions







Allergies, Adverse Reactions, Alerts

* Latex Allergy has not been assessed.

* IV Contrast Allergy has not been assessed.





Medication





Medication reconciliation has not been performed.

## 2018-02-11 NOTE — XMS REPORT
Transfer Level of Care 2016 23:27

 Created on: 2016



MARCIA HENDERSON

External Reference #: [id number]

: 1960

Sex: Male



Demographics







 Address  [address] [place] [address]  [address]

 

 Home Phone  [phone]

 

 Preferred Language  English

 

 Marital Status  Unknown

 

 Hindu Affiliation  Unknown

 

 Race  White

 

 Ethnic Group  Unknown





Author







 Author  GENERATED,  SYSTEM

 

 Organization  Unknown

 

 Address  Unknown

 

 Phone  Unavailable







Care Team Providers







 Care Team Member Name  Role  Phone

 

  PP  Unavailable







Reason For Visit







Chief Complaint

UA



Social History







Functional Status







Vital Signs







Results





Problems

Encounter Diagnosis





No relevant problems exist.



Encounters

Encounter Diagnosis

No relevant problems exist.



Plan of Care







Procedures





No relevant procedures performed.



Immunizations





No immunizations administered or ordered.



Hospital Course





Hospital Discharge Instructions







Allergies, Adverse Reactions, Alerts

* Latex Allergy has not been assessed.

* IV Contrast Allergy has not been assessed.





Medication





Medication reconciliation has not been performed.

## 2018-02-11 NOTE — XMS REPORT
Transition of Care/Referral Summary

 Created on: 2032



ROMULO HENDERSON

External Reference #: [id number]

: 1960

Sex: Male



Demographics







 Address  [address]

[place]  [address]

 

 Home Phone  [phone]

 

 Preferred Language  English

 

 Marital Status  Single

 

 Jewish Affiliation  Unspecified Anabaptist

 

 Race  White

 

 Ethnic Group  Not  or 





Author







 Author  [facility]

 

 Organization  [facility]

 

 Address  Unknown

 

 Phone  Unavailable







Care Team Providers







 Care Team Member Name  Role  Phone

 

 Jani Keenan  PCP  [phone]







Encounter





VC FIN [id number] Date(s): 16 - 16

[facility], [address] [place] [address] [phone]

Discharge Diagnosis: Parkinson's disease

Discharge Diagnosis: Pressure ulcer of left buttock, stage 2

Discharge Diagnosis: Schizophrenia

Discharge Disposition: 01-Home or Self Care

Attending Physician: Jani Keenan MD

Admitting Physician: Jani Keenan MD





Vital Signs







 



  Most recent to   1



  oldest [Reference 



  Range]: 

 

 



  Temperature Tympanic   36.4 degC



  [36.6-38.1 degC]   *LOW*



   (16 10:11 AM)

 

 



  Peripheral Pulse   68 bpm



  Rate [ bpm]   (16 10:11 AM)

 

 



  Blood Pressure   129/78 mmHg



  [/60-90 mmHg]   (16 10:11 AM)







Problem List







    



  Condition   Effective Dates   Status   Health Status   Informant

 

    



  Anxiety state    Active  



  (finding)(Confirmed)    

 

    



  Anxiety(Confirmed)   < 14   Resolved  

 

    



  Hypertension(Confirm    Active  



  ed)    

 

    



  Parkinson's(Confirme   < 14   Resolved  



  d)    

 

    



  Parkinson's disease    Active  



  (disorder)(Confirmed    



  )    

 

    



  Schizophrenia    Active  



  (disorder)(Confirmed    



  )    

 

    



  Schizophrenia(Confir   < 14   Resolved  



  med)    

 

    



  Chronic venous    Active  



  stasis    



  dermatitis(Confirmed    



  )    







Allergies, Adverse Reactions, Alerts





No Known Medication Allergies



Medications





Aricept 10 mg oral tablet

1 tabs, Oral, Bedtime (once a day), # 30 tabs, 0 Refill(s)

Start Date: 3/30/15

Status: Ordered



Ativan 1 mg oral tablet

1 mg 1 tabs, Oral, q6hr, as needed for anxiety, Fax to August, # 90 tabs, 0 
Refill(s)

Start Date: 16

Status: Ordered



Colace 100 mg oral capsule

100 mg 1 caps, Oral, BID, as needed for constipation, # 20 caps, 0 Refill(s)

Start Date: 16

Status: Ordered



Comtan 200 mg oral tablet

See Instructions, Take 1 tablet (200 MG) by oral route 2 times every day in 
combination with carbidopa and levodopa, 0 Refill(s)

Start Date: 14

Status: Ordered



divalproex sodium 500 mg oral delayed release tablet

See Instructions, TAKE 1 TABLET BY MOUTH TWICE DAILY, # 56 tabs, 2 Refill(s), 
eRx: [facility], TAKE 1 TABLET BY MOUTH TWICE DAILY

Start Date: 16

Status: Ordered



folic acid 1 mg oral tablet

1 mg 1 tabs, Oral, Daily, 0 Refill(s)

Start Date: 9/28/15

Status: Ordered



MiraLax oral powder for reconstitution

17 g, Oral, Daily, dissolve in water before taking, # 255 g, 0 Refill(s)

Start Date: 16

Status: Ordered



PARoxetine 40 mg oral tablet

See Instructions, TAKE 1 TABLET BY MOUTH DAILY AT BEDTIME, # 28 tabs, 2 Refill(s
), eRx: [facility], TAKE 1 TABLET BY MOUTH DAILY AT BEDTIME

Start Date: 16

Status: Ordered



Restoril 15 mg oral capsule

15 mg 1 caps, Oral, Bedtime (once a day), as needed for sleep, Fax to August, 
# 30 caps, 0 Refill(s)

Start Date: 16

Status: Ordered



Sinemet 25 mg-100 mg oral tablet

1 tabs, Oral, QID, 0 Refill(s)

Start Date: 14

Status: Ordered



Sinemet CR 50 mg-200 mg oral tablet, extended release

See Instructions, Take 1 tablet by oral route at 10 am and 6 pm., 0 Refill(s)

Start Date: 14

Status: Ordered



Tiazac 120 mg/24 hours oral capsule, extended release

See Instructions, TAKE ONE CAPSULE BY MOUTH EVERY MORNING AT 0800, # 28 caps, 0 
Refill(s), Pharmacy: [facility], TAKE ONE CAPSULE BY MOUTH EVERY 
MORNING AT 0800

Start Date: 14

Status: Ordered



traMADol 50 mg oral tablet

50 mg 1 tabs, Oral, q6hr, Fax to August, # 90 tabs, 0 Refill(s)

Start Date: 16

Status: Ordered



Results





No data available for this section



Immunizations







  



  Vaccine   Date   Refusal Reason

 

  



  tetanus/diphth/pertuss (Tdap) adult/adol   5/25/10 

 

  



  pneumococcal 23-polyvalent vaccine   5/25/10 







Procedures







   



  Procedure   Date   Related Diagnosis   Body Site

 

   



  Hernia repair     







Social History







 



  Social History Type   Response

 

 



  Smoking Status   Former smoker1







1d 



Assessment and Plan

Extracted from:





  



  Title: OV-Acute Buttock Wound*   Author: Jani Keenan MD   Date: 16









  Impression and Plan

Diagnosis

Parkinson's disease (VMG43-IT G20, Discharge, Medical).

Pressure ulcer of left buttock, stage 2 (XVM24-OB L89.322, Discharge, Medical).

Schizophrenia (RFO64-QX F20.9, Discharge, Medical).

## 2018-02-11 NOTE — XMS REPORT
Transfer Level of Care 2017 11:31

 Created on: 2017



MARCIA HENDERSON

External Reference #: [id number]

: 1960

Sex: Male



Demographics







 Address  [address] [place] [address]  [address]

 

 Home Phone  [phone]

 

 Preferred Language  English

 

 Marital Status  Unknown

 

 Mandaeism Affiliation  Unknown

 

 Race  White

 

 Ethnic Group  Unknown





Author







 Author  GENERATED,  SYSTEM

 

 Organization  Unknown

 

 Address  Unknown

 

 Phone  Unavailable







Care Team Providers







 Care Team Member Name  Role  Phone

 

  PP  Unavailable







Reason For Visit







Chief Complaint

F25.9, F06.0



Social History







Functional Status







Vital Signs







Results





Problems

Encounter Diagnosis





No relevant problems exist.



Encounters

Encounter Diagnosis

No relevant problems exist.



Plan of Care







Procedures





No relevant procedures performed.



Immunizations





No immunizations administered or ordered.



Hospital Course





Hospital Discharge Instructions







Allergies, Adverse Reactions, Alerts

This section is representative of the current allergy information, at the time 
of the CCD generation. In the case of regeneration of the CCD, the allergy 
information may not reflect the state of known allergies at the time of the CCD'
s subject visit.



* Latex Allergy has not been assessed.

* IV Contrast Allergy has not been assessed.





Medication





Medication reconciliation has not been performed.

## 2018-02-11 NOTE — XMS REPORT
Transfer Level of Care 2017 02:14

 Created on: 2017



LORELEI HENDERSON

External Reference #: [id number]

: 1960

Sex: Male



Demographics







 Address  [address] [place] [address]

 

 Preferred Language  English

 

 Marital Status  Unknown

 

 [employer] Affiliation  Unknown

 

 Race  White

 

 Ethnic Group  Unknown





Author







 Author  GENERATED,  SYSTEM

 

 Organization  Unknown

 

 Address  Unknown

 

 Phone  Unavailable







Care Team Providers







 Care Team Member Name  Role  Phone

 

 UNASSIGNED DOCTOR MD, MD  DOCTOR  PP  [phone]







Reason For Visit





Reason for Visit from 2017 4:35 AM:* Pt Stated Reason for Adm :  Pneymonia







Chief Complaint

PNA, SEPSIS, SCHIZOPHRENIA



Social History





Social History from 2017 1:29 PM:* Tobacco Use? : Unknown if Ever Smoked





Social History from 2017 4:35 AM:* Tobacco Use? : Unknown if Ever Smoked







Functional Status





Functional Status from 2017 12:17 PM:* LOC : Alert

* Oriented To : Unable to Assess

* Weight Bearing Status : Full

* Assist Level : Partial

* # Assists : 1





Functional Status from 2017 9:29 AM:* # Assists : 2





Functional Status from 2017 10:07 PM:* LOC : Alert

* Oriented To : Person

* Weight Bearing Status : Full

* Assist Level : Partial

* # Assists : 2





Functional Status from 2017 4:15 PM:* LOC : Alert

* Oriented To : Person

* Weight Bearing Status : Full

* Assist Level : Partial

* # Assists : 2





Functional Status from 2017 2:04 PM:* Oriented To : Person





Functional Status from 2017 9:16 AM:* Oriented To : Person,Place,Time





Functional Status from 2017 8:45 AM:* # Assists : 2





Functional Status from 2017 7:05 AM:* LOC : Confused

* Oriented To : Person

* Weight Bearing Status : Partial

* Assist Level : Partial

* # Assists : 2





Functional Status from 2017 9:30 PM:* LOC : Alert

* Oriented To : Person,Place

* Weight Bearing Status : Full

* Assist Level : Partial

* # Assists : 2





Functional Status from 2017 9:09 AM:* # Assists : 2





Functional Status from 2017 7:28 AM:* LOC : Alert

* Oriented To : Person,Place

* Weight Bearing Status : Full

* Assist Level : Partial

* # Assists : 2





Functional Status from 2017 10:48 PM:* LOC : Alert

* Oriented To : Person,Place

* Weight Bearing Status : Full

* Assist Level : Partial

* # Assists : 2





Functional Status from 2017 12:32 PM:* # Assists : 2





Functional Status from 2017 7:40 AM:* LOC : Alert

* Oriented To : Other - See Comments

* Weight Bearing Status : Full

* Assist Level : Partial

* # Assists : 1





Functional Status from 2017 8:30 PM:* LOC : Alert

* Oriented To : Person,Place,Time

* Weight Bearing Status : Full

* Assist Level : Dependent

* # Assists : 2





Functional Status from 2017 9:30 AM:* LOC : Confused

* Oriented To : Person

* Weight Bearing Status : Full

* Assist Level : Partial

* # Assists : 1





Functional Status from 2017 8:15 AM:* # Assists : 3





Functional Status from 2017 7:15 PM:* LOC : Confused

* Oriented To : Unable to Assess

* Weight Bearing Status : No wt bearing

* Assist Level : Dependent

* # Assists : 2





Functional Status from 2017 1:00 PM:* # Assists : 2





Functional Status from 2017 10:12 AM:* # Assists : 2





Functional Status from 2017 8:15 AM:* # Assists : 2





Functional Status from 2017 7:34 AM:* LOC : Alert

* Oriented To : Other - See Comments

* Weight Bearing Status : Full

* Assist Level : Partial

* # Assists : 2





Functional Status from 2017 8:00 PM:* LOC : Alert

* Oriented To : Person

* Weight Bearing Status : Full

* Assist Level : Partial

* # Assists : 2





Functional Status from 2017 9:18 AM:* LOC : Confused

* Oriented To : Person

* Weight Bearing Status : Full

* Assist Level : Partial

* # Assists : 3





Functional Status from 2017 8:56 PM:* LOC : Alert

* Oriented To : Person

* Weight Bearing Status : Full

* Assist Level : Partial

* # Assists : 2





Functional Status from 2017 6:58 PM:* # Assists : 3





Functional Status from 2017 4:45 PM:* # Assists : 2





Functional Status from 2017 4:38 PM:* # Assists : 3





Functional Status from 2017 9:05 AM:* # Assists : 3





Functional Status from 2017 9:04 AM:* # Assists : 2





Functional Status from 2017 8:37 AM:* LOC : Confused

* Oriented To : Person

* Weight Bearing Status : Full

* Assist Level : Partial

* # Assists : 2





Functional Status from 2017 8:21 AM:* # Assists : 2





Functional Status from 2017 8:50 PM:* LOC : Confused

* Oriented To : Person

* Weight Bearing Status : Full

* Assist Level : Partial

* # Assists : 2





Functional Status from 2017 7:38 AM:* LOC : Confused

* Oriented To : Other - See Comments

* Weight Bearing Status : Full

* Assist Level : Partial

* # Assists : 2





Functional Status from 2017 8:22 PM:* LOC : Alert

* Oriented To : Person,Place

* Weight Bearing Status : Full

* Assist Level : Partial

* # Assists : 1





Functional Status from 2017 8:33 AM:* LOC : Alert

* Oriented To : Person,Place

* Weight Bearing Status : Full

* Assist Level : Partial

* # Assists : 2





Functional Status from 2017 8:06 PM:* LOC : Alert

* Oriented To : Person,Place,Event

* Weight Bearing Status : Full

* Assist Level : Partial

* # Assists : 1





Functional Status from 2017 4:19 PM:* # Assists : 2





Functional Status from 2017 1:03 PM:* # Assists : 2





Functional Status from 2017 10:54 AM:* # Assists : 2





Functional Status from 2017 10:41 AM:* LOC : Alert

* Oriented To : Person,Place

* Weight Bearing Status : Full

* Assist Level : Partial

* # Assists : 2





Functional Status from 2017 9:07 AM:* # Assists : 2





Functional Status from 2017 4:35 AM:* LOC : Drowsy

* Oriented To : Person,Place

* Weight Bearing Status : Other (See reason in Comments)

* Assist Level : Dependent

* # Assists : 2







Vital Signs





Hospital Vital Signs from 2017 2:37 PM:* Height : 6/1 ft,in

* Temperature : 97.9 F

* Pulse : 100

* Respirations : 18

* BP : 136/80





Hospital Vital Signs from 2017 11:20 AM:* Height : 6/1 ft,in

* Temperature : 95.6 F

* Pulse : 95

* Respirations : 18

* BP : 158/91





Hospital Vital Signs from 2017 7:56 AM:* Height : 6/1 ft,in

* Temperature : 97.6 F

* Pulse : 79

* Respirations : 18

* BP : 111/67





Hospital Vital Signs from 2017 6:35 AM:* Weight : 85.2/ kg

* Height : 6/1 ft,in





Hospital Vital Signs from 2017 2:59 AM:* Height : 6/1 ft,in

* Temperature : 97.3 F

* Pulse : 101

* Respirations : 18

* BP : 162/98





Hospital Vital Signs from 2017 11:24 PM:* Height : 6/1 ft,in

* Temperature : 96.6 F

* Pulse : 83

* Respirations : 18

* BP : 135/79





Hospital Vital Signs from 2017 6:32 PM:* Height : 6/1 ft,in

* Temperature : 97.8 F

* Pulse : 108

* Respirations : 18

* BP : 148/85





Hospital Vital Signs from 2017 3:30 PM:* Height : 6/1 ft,in

* Temperature : 97.7 F

* Pulse : 84

* Respirations : 18

* BP : 131/75





Hospital Vital Signs from 2017 12:04 PM:* Height : 6/1 ft,in

* Temperature : 96.9 F

* Pulse : 99

* Respirations : 18

* BP : 142/74





Hospital Vital Signs from 2017 8:43 AM:* Height : 6/1 ft,in

* Temperature : 96.4 F

* Pulse : 105

* Respirations : 18

* BP : 175/90





Hospital Vital Signs from 2017 12:53 AM:* Weight : 88.6/ kg

* Height : 6/1 ft,in





Hospital Vital Signs from 2017 11:07 PM:* Height : 6/1 ft,in

* Temperature : 98.5 F

* Pulse : 99

* Respirations : 20

* BP : 132/90





Hospital Vital Signs from 2017 8:00 PM:* Height : 6/1 ft,in

* Temperature : 98.7 F

* Pulse : 92

* Respirations : 20

* BP : 110/78





Hospital Vital Signs from 2017 4:39 PM:* Height : 6/1 ft,in

* Temperature : 98.4 F

* Pulse : 87

* Respirations : 18

* BP : 111/79





Hospital Vital Signs from 2017 11:48 AM:* Height : 6/1 ft,in

* Temperature : 98.1 F

* Pulse : 98

* Respirations : 18

* BP : 138/90





Hospital Vital Signs from 2017 9:06 AM:* Height : 6/1 ft,in

* Temperature : 98.0 F

* Pulse : 79

* Respirations : 16

* BP : 123/70





Hospital Vital Signs from 2017 10:32 PM:* Height : 6/1 ft,in

* Temperature : 97.6 F

* Pulse : 88

* Respirations : 18

* BP : 131/78





Hospital Vital Signs from 2017 7:29 PM:* Height : 6/1 ft,in

* Temperature : 98.4 F

* Pulse : 92

* Respirations : 18

* BP : 136/90





Hospital Vital Signs from 2017 4:02 PM:* Height : 6/1 ft,in

* Temperature : 98.8 F

* Pulse : 97

* Respirations : 18

* BP : 113/65





Hospital Vital Signs from 2017 8:00 AM:* Height : 6/1 ft,in

* Temperature : 97.4 F

* Pulse : 81

* Respirations : 17

* BP : 121/78





Hospital Vital Signs from 2017 8:01 PM:* Height : 6/1 ft,in

* Temperature : 97.4 F

* Pulse : 84

* Respirations : 18

* BP : 129/74





Hospital Vital Signs from 2017 2:16 PM:* Height : 6/1 ft,in

* Temperature : 97.2 F

* Pulse : 86

* Respirations : 18

* BP : 159/89





Hospital Vital Signs from 2017 7:25 AM:* Height : 6/1 ft,in

* Temperature : 98.4 F

* Pulse : 76

* Respirations : 18

* BP : 148/76





Hospital Vital Signs from 2017 12:13 AM:* Weight : 89.1/ kg

* Height : 6/1 ft,in





Hospital Vital Signs from 2017 10:56 PM:* Height : 6/1 ft,in

* Temperature : 97.5 F

* Pulse : 79

* Respirations : 20

* BP : 128/79





Hospital Vital Signs from 2017 6:19 PM:* Height : 6/1 ft,in

* Temperature : 97.3 F

* Pulse : 91

* Respirations : 18

* BP : 128/71





Hospital Vital Signs from 2017 2:11 PM:* Height : 6/1 ft,in

* Temperature : 98.4 F

* Pulse : 89

* Respirations : 18

* BP : 158/93





Hospital Vital Signs from 2017 11:26 AM:* Height : 6/1 ft,in

* Temperature : 98.6 F

* Pulse : 92

* Respirations : 18

* BP : 165/99





Hospital Vital Signs from 2017 7:29 AM:* Height : 6/1 ft,in

* Temperature : 98.0 F

* Pulse : 91

* Respirations : 18

* BP : 136/80





Hospital Vital Signs from 2017 3:05 AM:* Weight : 88.0/ kg

* Height : 6/1 ft,in





Hospital Vital Signs from 2017 9:26 PM:* Height : 6/1 ft,in

* Temperature : 97.5 F

* Pulse : 91

* Respirations : 16

* BP : 136/74





Hospital Vital Signs from 2017 7:06 PM:* Height : 6/1 ft,in

* Temperature : 97.9 F

* Pulse : 84

* Respirations : 16

* BP : 127/69





Hospital Vital Signs from 2017 2:49 PM:* Height : 6/1 ft,in

* Temperature : 98.0 F

* Pulse : 84

* Respirations : 18

* BP : 139/81





Hospital Vital Signs from 2017 10:22 AM:* Height : 6/1 ft,in

* Temperature : 98.9 F

* Pulse : 85

* Respirations : 18

* BP : 141/85





Hospital Vital Signs from 2017 9:18 AM:* Heart Rate : 88





Hospital Vital Signs from 2017 8:42 AM:* Weight : 87.7/ kg

* Height : 6/1 ft,in





Hospital Vital Signs from 2017 7:37 AM:* Height : 6/1 ft,in

* Temperature : 97.2 F

* Pulse : 80

* Respirations : 18

* BP : 118/68





Hospital Vital Signs from 2017 3:06 AM:* Height : 6/1 ft,in

* Temperature : 97.1 F

* Pulse : 84

* Respirations : 18

* BP : 131/84





Hospital Vital Signs from 2017 12:44 AM:* Weight : 87.7/ kg

* Height : 6/1 ft,in





Hospital Vital Signs from 2017 10:09 PM:* Height : 6/1 ft,in

* Temperature : 98.1 F

* Pulse : 93

* Respirations : 18

* BP : 150/84





Hospital Vital Signs from 2017 7:36 PM:* Height : 6/1 ft,in

* Temperature : 98.3 F

* Pulse : 92

* Respirations : 18

* BP : 113/59





Hospital Vital Signs from 2017 4:17 PM:* Height : 6/1 ft,in

* Temperature : 100.6 F

* Pulse : 98

* Respirations : 18

* BP : 95/55





Hospital Vital Signs from 2017 3:16 PM:* Temperature : 100.6 F





Hospital Vital Signs from 2017 10:15 AM:* Height : 6/1 ft,in

* Temperature : 100.1 F

* Pulse : 100

* Respirations : 18

* BP : 139/70





Hospital Vital Signs from 2017 8:37 AM:* Heart Rate : 110





Hospital Vital Signs from 2017 7:00 AM:* Height : 6/1 ft,in

* Temperature : 99.1 F

* Pulse : 93

* Respirations : 20

* BP : 127/70





Hospital Vital Signs from 2017 2:12 AM:* Height : 6/1 ft,in

* Temperature : 99.6 F

* Pulse : 101

* Respirations : 20

* BP : 138/86





Hospital Vital Signs from 2017 10:44 PM:* Height : 6/1 ft,in

* Temperature : 102.2 F

* Pulse : 103

* Respirations : 20

* BP : 130/70





Hospital Vital Signs from 2017 9:19 PM:* Height : 6/1 ft,in

* Temperature : 101.3 F





Hospital Vital Signs from 2017 7:01 PM:* Height : 6/1 ft,in

* Temperature : 100.7 F

* Pulse : 109

* Respirations : 18

* BP : 149/85





Hospital Vital Signs from 2017 3:10 PM:* Height : 6/1 ft,in

* Temperature : 102.5 F

* Pulse : 48

* Respirations : 18

* BP : 182/72





Hospital Vital Signs from 2017 11:21 AM:* Height : 6/1 ft,in

* Temperature : 100.8 F

* Pulse : 89

* Respirations : 20

* BP : 151/88





Hospital Vital Signs from 2017 8:18 AM:* Weight : 87.5/ kg

* Height : 6/1 ft,in





Hospital Vital Signs from 2017 7:23 AM:* Height : 6/1 ft,in

* Temperature : 100.3 F

* Pulse : 97

* Respirations : 20

* BP : 143/78





Hospital Vital Signs from 2017 2:59 AM:* Weight : 87.5/ kg

* Height : 6/1 ft,in

* Temperature : 100.4 F

* Pulse : 94

* Respirations : 20

* BP : 131/81





Hospital Vital Signs from 2017 10:24 PM:* Height : 6/1 ft,in

* Temperature : 100.6 F

* Pulse : 98

* Respirations : 20

* BP : 135/87





Hospital Vital Signs from 2017 6:02 PM:* Height : 6/1 ft,in

* Temperature : 101.3 F

* Pulse : 94

* Respirations : 18

* BP : 109/57





Hospital Vital Signs from 2017 4:00 PM:* Height : 6/1 ft,in

* Temperature : 101.5 F

* Pulse : 110

* Respirations : 22

* BP : 143/73





Hospital Vital Signs from 2017 1:56 PM:* Height : 6/1 ft,in

* Temperature : 99.4 F

* Pulse : 95

* Respirations : 18

* BP : 127/71





Hospital Vital Signs from 2017 10:48 AM:* Height : 6/1 ft,in

* Temperature : 100.4 F

* Pulse : 107

* Respirations : 18

* BP : 123/82





Hospital Vital Signs from 2017 7:21 AM:* Height : 6/1 ft,in

* Temperature : 98.9 F

* Pulse : 106

* Respirations : 18

* BP : 135/81





Hospital Vital Signs from 2017 11:01 PM:* Height : 6/1 ft,in

* Temperature : 98.7 F

* Pulse : 98

* Respirations : 16

* BP : 125/65





Hospital Vital Signs from 2017 7:40 PM:* Height : 6/1 ft,in

* Temperature : 99.0 F

* Pulse : 69

* Respirations : 18

* BP : 107/58





Hospital Vital Signs from 2017 4:02 PM:* Height : 6/1 ft,in

* Temperature : 99.8 F





Hospital Vital Signs from 2017 2:39 PM:* Height : 6/1 ft,in

* Temperature : 97.4 F

* Pulse : 100

* Respirations : 18

* BP : 126/82





Hospital Vital Signs from 2017 1:01 PM:* Height : 6/1 ft,in

* Temperature : 100.4 F

* Pulse : 112

* Respirations : 18

* BP : 156/85





Hospital Vital Signs from 2017 6:50 AM:* Height : 6/1 ft,in

* Temperature : 98.5 F

* Pulse : 85

* Respirations : 18

* BP : 139/81





Hospital Vital Signs from 2017 4:59 AM:* Weight : 91.3/ kg

* Height : 6/1 ft,in

* Temperature : 98.9 F

* Pulse : 89

* Respirations : 16

* BP : 139/82





Hospital Vital Signs from 2017 4:35 AM:* Weight : 91.3/ kg

* Height : 6/1 ft,in







Results





Blood Gas from 2017 4:39 PM*ARTERIAL PH 7.487  H (7.350-7.450 )

*ARTERIAL PC02 33.1 MM HG L (35.0-45.0 MM HG)

*ART. PO2 63 MM HG L (80-95 MM HG)

*ART. TOTAL CO2 21.3 mmol/L (20.0-30.0 mmol/L)

*ART. BICARBONATE 24.8 MEQ/L (19.0-29.0 MEQ/L)

*ART. BASE EXCESS 2.4  (-2.5-2.5 )

ART. O2 SATURATION 92.5 % (91.0-97.0 %)

*PATIENT ATMOSPHERE ROOM AIR    (Reference Range: not available)



*SPECIMEN SITE ARTERIAL    (Reference Range: not available)





Chemistry from 2017 1:31 PMSODIUM 137 MMOL/L (136-145 MMOL/L)

POTASSIUM 3.6 MMOL/L (3.5-5.1 MMOL/L)

CHLORIDE 102 MMOL/L ( MMOL/L)

TCO2 25.2 MMOL/L (21.0-32.0 MMOL/L)

*ANION GAP 9.8 MMOL/L (8.0-16.0 MMOL/L)

BUN 15 MG/DL (7-18 MG/DL)

CREATININE 0.69 MG/DL L (0.70-1.30 MG/DL)

*BUN/CREATININE RATIO 21.7  H (9.1-17.0 )

GLUCOSE 132 MG/DL H (65-99 MG/DL)

*GFR EST NON AFR AMERICAN >90 ML/MIN   (Reference Range: not available)



*GFR EST AFR AMER >90 ML/MIN   (Reference Range: not available)



CALCIUM 8.8 MG/DL (8.5-10.1 MG/DL)

BILIRUBIN TOTAL 0.30 MG/DL (0.20-1.00 MG/DL)

TOTAL PROTEIN 6.7 GM/DL (6.4-8.2 GM/DL)

ALBUMIN 2.6 GM/DL L (3.4-5.0 GM/DL)

*GLOBULIN 4.1 GM/DL H (2.3-3.5 GM/DL)

*A/G RATIO 0.6  L (1.5-2.2 )

ALK PHOS 45 U/L L ( U/L)

ALT (SGPT) 48 U/L (14-59 U/L)

AST (SGOT) 59 U/L H (15-37 U/L)



Chemistry from 2017 6:07 AMSODIUM 138 MMOL/L (136-145 MMOL/L)

POTASSIUM 4.3 MMOL/L (3.5-5.1 MMOL/L)

CHLORIDE 101 MMOL/L ( MMOL/L)

TCO2 28.2 MMOL/L (21.0-32.0 MMOL/L)

*ANION GAP 8.8 MMOL/L (8.0-16.0 MMOL/L)

BUN 21 MG/DL H (7-18 MG/DL)

CREATININE 0.83 MG/DL (0.70-1.30 MG/DL)

*BUN/CREATININE RATIO 25.3  H (9.1-17.0 )

GLUCOSE 113 MG/DL H (65-99 MG/DL)

*GFR EST NON AFR AMERICAN >90 ML/MIN   (Reference Range: not available)



*GFR EST AFR AMER >90 ML/MIN   (Reference Range: not available)



CALCIUM 9.1 MG/DL (8.5-10.1 MG/DL)

MAGNESIUM 2.1 MG/DL (1.8-2.4 MG/DL)



Chemistry from 2017 1:39 PMPROCALCITONIN 0.49 NG/ML (0.05-0.50 NG/ML)



Chemistry from 2017 7:06 AMSODIUM 134 MMOL/L L (136-145 MMOL/L)

POTASSIUM 3.9 MMOL/L (3.5-5.1 MMOL/L)

CHLORIDE 98 MMOL/L ( MMOL/L)

TCO2 25.7 MMOL/L (21.0-32.0 MMOL/L)

*ANION GAP 10.3 MMOL/L (8.0-16.0 MMOL/L)

BUN 14 MG/DL (7-18 MG/DL)

CREATININE 0.73 MG/DL (0.70-1.30 MG/DL)

*BUN/CREATININE RATIO 19.2  H (9.1-17.0 )

GLUCOSE 114 MG/DL H (65-99 MG/DL)

*GFR EST NON AFR AMERICAN >90 ML/MIN   (Reference Range: not available)



*GFR EST AFR AMER >90 ML/MIN   (Reference Range: not available)



CALCIUM 8.6 MG/DL (8.5-10.1 MG/DL)

ALBUMIN 3.2 GM/DL L (3.4-5.0 GM/DL)

MAGNESIUM 1.8 MG/DL (1.8-2.4 MG/DL)

PHOSPHORUS 2.4 MG/DL L (2.6-4.7 MG/DL)



Hematology from 2017 1:32 PMWBC 8.5 X10e3/UL (3.6-11.2 X10e3/UL)

RBC 4.62 X10e6/UL (4.06-5.63 X10e6/UL)

HEMOGLOBIN 14.7 G/DL (12.5-16.3 G/DL)

HEMATOCRIT 42.7 % (36.7-47.1 %)

*MCV 92.4 FL (80.0-100.0 FL)

*MCH 31.7 PG (27.0-33.0 PG)

*MCHC 34.3 G/DL (32.0-36.0 G/DL)

*RDW 14.0 % (12.3-17.0 %)

*RDWSD 45.5  (37.1-47.8 )

PLATELET 287 X10e3/UL (159-386 X10e3/UL)

*MPV 8.7 FL (7.4-10.4 FL)



Hematology from 2017 5:33 AMWBC 5.9 X10e3/UL (3.6-11.2 X10e3/UL)

RBC 4.43 X10e6/UL (4.06-5.63 X10e6/UL)

HEMOGLOBIN 13.9 G/DL (12.5-16.3 G/DL)

HEMATOCRIT 41.0 % (36.7-47.1 %)

*MCV 92.5 FL (80.0-100.0 FL)

*MCH 31.5 PG (27.0-33.0 PG)

*MCHC 34.0 G/DL (32.0-36.0 G/DL)

*RDW 13.9 % (12.3-17.0 %)

*RDWSD 45.1  (37.1-47.8 )

PLATELET 147 X10e3/UL L (159-386 X10e3/UL)

*MPV 9.7 FL (7.4-10.4 FL)

AUTOMATED DIFF PERFORMED    (Reference Range: not available)



SEGS 85.5 %   (Reference Range: not available)



*LYMPHOCYTES 5.4 %   (Reference Range: not available)



*MONOCYTES 8.9 %   (Reference Range: not available)



*EOSINOPHILS 0.1 %   (Reference Range: not available)



*BASOPHILS 0.1 %   (Reference Range: not available)



*ABSOLUTE NEUTROPHILS 5.10 X10e3/UL (1.80-7.80 X10e3/UL)

*ABSOLUTE LYMPHOCYTES 0.30 X10e3/UL L (1.00-3.00 X10e3/UL)

*ABSOLUTE MONOCYTES 0.50 X10e3/UL (0.30-1.00 X10e3/UL)

*ABSOLUTE EOSINOPHILS 0.00 X10e3/UL (0.00-0.50 X10e3/UL)

*ABSOLUTE BASOPHILS 0.00 X10e3/UL (0.00-0.20 X10e3/UL)



Hematology from 2017 6:07 AMWBC 11.4 X10e3/UL H (3.6-11.2 X10e3/UL)

RBC 4.77 X10e6/UL (4.06-5.63 X10e6/UL)

HEMOGLOBIN 15.1 G/DL (12.5-16.3 G/DL)

HEMATOCRIT 44.2 % (36.7-47.1 %)

*MCV 92.5 FL (80.0-100.0 FL)

*MCH 31.6 PG (27.0-33.0 PG)

*MCHC 34.1 G/DL (32.0-36.0 G/DL)

*RDW 13.8 % (12.3-17.0 %)

*RDWSD 44.2  (37.1-47.8 )

PLATELET 142 X10e3/UL L (159-386 X10e3/UL)

*MPV 9.1 FL (7.4-10.4 FL)



Reference Lab from 2017 1:10 PMLEGIONELLA PNEUMO URINE AG Negative  (
Negative )



DX Radiology from 2017 5:14 AMCHEST 1 VIEW History:

Pneumonia

Priors:  Chest x-ray dated 2017

Findings:

There has been mild interval improvement in right upper right lower lobe

infiltrates.  There has been slight interval progression in a left basilar

infiltrate. No significant pleural effusion or pneumothorax is seen. The

heart size and pulmonary vasculature are within normal

limits.

Impression:

Improving right upper and right lower lobe pneumonia and slight interval

progression in left basilar infiltrate, suspicious for

pneumonia.

Electronically signed by: Ayla Platt MD

Dictated: 2017 08:10    (Reference Range: not available)





DX Radiology from 2017 4:36 PMCHEST 1 VIEW History:

tachypnea

Priors:  Chest x-ray dated 2017

Findings:

Since the prior study there has been interval progression in a right upper

lobe infiltrate and development of a right basilar infiltrate, suggestive

of pneumonia. There has also been development of a subtle left basilar

infiltrate.  No significant pleural effusion or pneumothorax is

seen.

Impression:

Worsening right upper lobe pneumonia and development of right basilar

pneumonia.

Development of a mild left basilar infiltrate which may reflect

atelectasis versus additional pneumonia.

Electronically signed by: Ayla Platt MD

Dictated: 2017 07:51    (Reference Range: not available)





Problems

Encounter Diagnosis

* Mobility Impairment Status:Active. 

* Nutritional Deficiency Status:Active. 





Encounters

Encounter Diagnosis

* Mobility Impairment Status:Active. 

* Nutritional Deficiency Status:Active. 





Plan of Care





Treatment Plan from 2017 3:52 PM:* Care Management Note : BODY,TD,TH,
BUTTON,INPUT,SELECT,TEXTAREA{FONT-SIZE: 10pt; FONT-FAMILY: Affton,Helvetica; 
COLOR: black;} P,DIV,UL,OL,BLOCKQUOTE{MARGIN-BOTTOM: 0px; MARGIN-TOP: 0px;} BODY
{MARGIN: 5px;} [facility] called back, stating they will be here around 
1615 to  patient to take back to their facility. I left voice message 
for patient's brother, Rosalino, to inform him.





Treatment Plan from 2017 2:45 PM:* Care Management Note : BODY,TD,TH,
BUTTON,INPUT,SELECT,TEXTAREA{FONT-SIZE: 10pt; FONT-FAMILY: Affton,Helvetica; 
COLOR: black;} P,DIV,UL,OL,BLOCKQUOTE{MARGIN-BOTTOM: 0px; MARGIN-TOP: 0px;} BODY
{MARGIN: 5px;} 



Patient's dpoa, Rosalino, called and gave me permission to make referral to nursing 
home in [place], [place], stating "I guess this is the next step". [facility] 
 did not call me this a.m so I contacted her at 1030.  She asked 
if I made referral.  I requested contact info to facility so referral can be 
made.  I asked her what plan is if that facility will not accept today since 
patient has discharge orders since yesterday.  She stated, "I guess we have no 
choice but to take him back".  I then called and left voice message with Dory 
at [facility] at [phone] requesting she call me back. I then faxed 
referral info and have confirmation it was sent at 1119. I then called again at 
1400 and left another voice message with Dory asking her to call me regarding 
referral.  At 1430 I contacted [facility] to inform them I have not 
received any call back from [facility].  , Nicky, stated that 
[facility] called them and can take patient tomorrow because they did not 
receive my fax until 1345 today.  I informed Nicky this was inaccurate info. I 
asked that they take patient back today and then he can transfer tomorrow to 
[place]. She stated she is between a rock and a hard place because facility will 
probably be cited regardless of what decision they make.  She states she will 
be talking to   about what to do and will call me right back.





Treatment Plan from 2017 1:00 PM:* Care Management Note : BODY,TD,TH,
BUTTON,INPUT,SELECT,TEXTAREA{FONT-SIZE: 10pt; FONT-FAMILY: Aff[employer],Helvetica; 
COLOR: black;} P,DIV,UL,OL,BLOCKQUOTE{MARGIN-BOTTOM: 0px; MARGIN-TOP: 0px;} BODY
{MARGIN: 5px;} Patient discharging back to [facility]. Viridiana with [facility] 
notified.





Treatment Plan from 2017 4:18 PM:* Care Management Note : BODY,TD,TH,
BUTTON,INPUT,SELECT,TEXTAREA{FONT-SIZE: 10pt; FONT-FAMILY: Affton,Helvetica; 
COLOR: black;} P,DIV,UL,OL,BLOCKQUOTE{MARGIN-BOTTOM: 0px; MARGIN-TOP: 0px;} BODY
{MARGIN: 5px;} [facility] 
since they did not call back. I was informed by  that they cannot 
accept today, that they are still awaiting  to hear from [facility].  When I asked 
why they have not given a 30 day notice if this has been an ongoing issue, I 
was told that it wouldn't do any good because no place will take patient. 
 also stated they spoke with dennis who is not giving permission to 
make referral to any agency at this time.  At this time, dismissal for today 
will not occur and  will call me tomorrow after she hears from 
[facility].  Due to this issue, report filed with [facility] and [facility]. 





Treatment Plan from 2017 2:45 PM:* Care Management Note : BODY,TD,TH,
BUTTON,INPUT,SELECT,TEXTAREA{FONT-SIZE: 10pt; FONT-FAMILY: Affton,Helvetica; 
COLOR: black;} P,DIV,UL,OL,BLOCKQUOTE{MARGIN-BOTTOM: 0px; MARGIN-TOP: 0px;} BODY
{MARGIN: 5px;} [facility] so left voice mail on 's phone to call 
me.  I also called patient's brother, Rosalino, to see if he was aware of situation 
that [facility].  He stated he just found 
this out today and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

## 2018-02-11 NOTE — XMS REPORT
[facility], [facility].

 Created on: 2016



DamonBob

External Reference #: [id number]

: 1960

Sex: Male



Demographics







 Address  [address] [place]  [address]

 

 Home Phone  [phone]

 

 Preferred Language  Unknown

 

 Marital Status  Unknown

 

 Yazidi Affiliation  Unknown

 

 Race  White

 

 Ethnic Group  Not  or 





Author







 Taty Bartholomew

 

 [facility]  [employer]

 

 Address  Unknown

 

 Phone  Unavailable







Care Team Providers







 Care Team Member Name  Role  Phone

 

 Taty New  CP  Unavailable



                                                                



Allergies

          No Known Allergies                                                   
                                     



Problems

          





 Problem Type  Condition  Code  Onset Dates  Condition Status

 

 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

 

 Problem  Parkinson's disease  G20     Active



                                                                               
                   



Medications

          





 Medication  Code System  Code  Instructions  Start Date  End Date  Status  
Dosage

 

 Seroquel  NDC  [address]-10  100 MG Orally Once a day  2016        1 
tablet at bedtime



                                                                              



Results

          No Known Results                                                     
               



Summary Purpose

          eClinicalWorks Submission

## 2018-02-11 NOTE — XMS REPORT
Transfer Level of Care 10/11/2016 11:30

 Created on: 10/11/2016



MARCIA HENDERSON

External Reference #: [id number]

: 1960

Sex: Male



Demographics







 Address  [address] [place] [address]  [address]

 

 Home Phone  [phone]

 

 Preferred Language  English

 

 Marital Status  Unknown

 

 Oriental orthodox Affiliation  Unknown

 

 Race  White

 

 Ethnic Group  Unknown





Author







 Author  GENERATED,  SYSTEM

 

 Organization  Unknown

 

 Address  Unknown

 

 Phone  Unavailable







Care Team Providers







 Care Team Member Name  Role  Phone

 

  PP  Unavailable







Reason For Visit







Chief Complaint

Z79.899



Social History







Functional Status







Vital Signs







Results





Problems

Encounter Diagnosis





No relevant problems exist.



Encounters

Encounter Diagnosis

No relevant problems exist.



Plan of Care







Procedures





No relevant procedures performed.



Immunizations





No immunizations administered or ordered.



Hospital Course





Hospital Discharge Instructions







Allergies, Adverse Reactions, Alerts

* Latex Allergy has not been assessed.

* IV Contrast Allergy has not been assessed.





Medication





Medication reconciliation has not been performed.

## 2018-02-11 NOTE — XMS REPORT
Transition of Care/Referral Summary

 Created on: 2099



ROMULO HENDERSON

External Reference #: [id number]

: 1960

Sex: Male



Demographics







 Address  [address]

[place]  [address]

 

 Home Phone  [phone]

 

 Preferred Language  English

 

 Marital Status  Single

 

 Moravian Affiliation  Unspecified Uatsdin

 

 Race  White

 

 Ethnic Group  Not  or 





Author







 Author  [facility]

 

 Organization  [facility]

 

 Address  Unknown

 

 Phone  Unavailable







Care Team Providers







 Care Team Member Name  Role  Phone

 

 Jani Keenan  PCP  [phone]







Encounter





VC FIN [id number] Date(s): 16 - 16

[facility] [address] [place] [address]      [phone]

Discharge Disposition: 01-Home or Self Care

Attending Physician: David Lay

Admitting Physician: David Lay





Vital Signs







 



  Most recent to   1



  oldest [Reference 



  Range]: 

 

 



  Peripheral Pulse   75 bpm



  Rate [ bpm]   (16 10:51 AM)

 

 



  Respiratory Rate   18 br/min



  [14-20 br/min]   (16 10:51 AM)

 

 



  Blood Pressure   132/74 mmHg



  [/60-90 mmHg]   (16 10:51 AM)

 

 



  SpO2   97 %



   (16 10:51 AM)







Problem List







    



  Condition   Effective Dates   Status   Health Status   Informant

 

    



  Anxiety(Confirmed)   < 14   Resolved  

 

    



  Anxiety state    Active  



  (finding)(Confirmed)    

 

    



  Hypertension(Confirm    Active  



  ed)    

 

    



  Parkinson's(Confirme   < 14   Resolved  



  d)    

 

    



  Parkinson's disease    Active  



  (disorder)(Confirmed    



  )    

 

    



  Schizophrenia    Active  



  (disorder)(Confirmed    



  )    

 

    



  Schizophrenia(Confir   < 14   Resolved  



  med)    







Allergies, Adverse Reactions, Alerts





No Known Medication Allergies



Medications





amantadine 100 mg oral capsule

1 caps, Oral, Daily, # 30 caps, 0 Refill(s)

Start Date: 3/30/15

Status: Ordered



Aricept 10 mg oral tablet

1 tabs, Oral, Bedtime (once a day), # 30 tabs, 0 Refill(s)

Start Date: 3/30/15

Status: Ordered



Ativan 1 mg oral tablet

1 mg 1 tabs, Oral, q6hr, as needed for anxiety, 0 Refill(s)

Start Date: 16

Status: Ordered



Colace 100 mg oral capsule

100 mg 1 caps, Oral, BID, as needed for constipation, # 20 caps, 0 Refill(s)

Start Date: 16

Status: Ordered



Comtan 200 mg oral tablet

See Instructions, Take 1 tablet (200 MG) by oral route 4 times every day in 
combination with carbidopa and levodopa, 0 Refill(s)

Start Date: 14

Status: Ordered



Depakote 500 mg oral delayed release tablet

500 mg 1 tabs, Oral, BID, 0 Refill(s)

Start Date: 9/28/15

Status: Ordered



folic acid 1 mg oral tablet

1 mg 1 tabs, Oral, Daily, 0 Refill(s)

Start Date: 9/28/15

Status: Ordered



Milk of Magnesia

30 mL, Oral, Bedtime (once a day), 0 Refill(s)

Start Date: 16

Status: Ordered



MiraLax oral powder for reconstitution

17 g, Oral, Daily, dissolve in water before taking, # 255 g, 0 Refill(s)

Start Date: 16

Status: Ordered



Paxil 40 mg oral tablet

40 mg 1 tabs, Oral, Daily, 0 Refill(s)

Start Date: 9/28/15

Status: Ordered



QUEtiapine 100 mg oral tablet

100 mg 1 tabs, Oral, Daily, 0 Refill(s)

Start Date: 16

Status: Ordered



Restoril 15 mg oral capsule

15 mg 1 caps, Oral, Bedtime (once a day), as needed for sleep, 0 Refill(s)

Start Date: 16

Status: Ordered



SEROquel 200 mg oral tablet

200 mg 1 tabs, Oral, BID, 0 Refill(s)

Start Date: 9/28/15

Status: Ordered



Sinemet 25 mg-100 mg oral tablet

2 tabs, Oral, QID, 0 Refill(s)

Start Date: 14

Status: Ordered



Sinemet CR 50 mg-200 mg oral tablet, extended release

See Instructions, Take 1 tablet by oral route at 10 am and 6 pm., 0 Refill(s)

Start Date: 14

Status: Ordered



Tiazac 120 mg/24 hours oral capsule, extended release

See Instructions, TAKE ONE CAPSULE BY MOUTH EVERY MORNING AT 0800, # 28 caps, 0 
Refill(s), Pharmacy: [facility], TAKE ONE CAPSULE BY MOUTH EVERY 
MORNING AT 0800

Start Date: 14

Status: Ordered



traMADol 50 mg oral tablet

50 mg 1 tabs, Oral, q6hr, [phone], # 120 tabs, 0 Refill(s)

Start Date: 16

Status: Ordered



trihexyphenidyl 2 mg oral tablet

1 tabs, Oral, BID, 0 Refill(s)

Start Date: 14

Status: Ordered



Results





No data available for this section



Immunizations







  



  Vaccine   Date   Refusal Reason

 

  



  tetanus/diphth/pertuss (Tdap) adult/adol   5/25/10 

 

  



  pneumococcal 23-polyvalent vaccine   5/25/10 







Procedures







   



  Procedure   Date   Related Diagnosis   Body Site

 

   



  Hernia repair     







Social History







 



  Social History Type   Response

 

 



  Smoking Status   Former smoker1







1dc 



Assessment and Plan





No data available for this section

## 2018-02-11 NOTE — XMS REPORT
Transfer Level of Care 2017 00:40

 Created on: 2017



BEATRIZ LORELEI SEBASTIAN

External Reference #: [id number]

: 1960

Sex: Male



Demographics







 [address]  [address] [place] [id number]

 

 Preferred Language  English

 

 Marital Status  Unknown

 

 Episcopalian Affiliation  Unknown

 

 Race  White

 

 Ethnic Group  Unknown





Author







 Author  GENERATED,  SYSTEM

 

 Organization  Unknown

 

 Address  Unknown

 

 Phone  Unavailable







Care Team Providers







 Care Team Member Name  Role  Phone

 

 UNASSIGNED DOCTOR MD, MD  DOCTOR  PP  [phone]







Reason For Visit







Chief Complaint

CONFUSION/AGITATION



Social History







Functional Status







Vital Signs







Results





Problems

Encounter Diagnosis





No relevant problems exist.

Additional Problems

* Mobility Impairment Comment:Problem resolved by [facility] upon 
Discharge, Status:Resolved. 

* Nutritional Deficiency Comment:Problem resolved by [facility] upon 
Discharge, Status:Resolved. 





Encounters

Encounter Diagnosis

No relevant problems exist.





Plan of Care







Procedures





No relevant procedures performed.



Immunizations





No immunizations administered or ordered.



Hospital Course





Hospital Discharge Instructions







Allergies, Adverse Reactions, Alerts

This section is representative of the current allergy information, at the time 
of the CCD generation. In the case of regeneration of the CCD, the allergy 
information may not reflect the state of known allergies at the time of the CCD'
s subject visit.



* Latex Allergy has not been assessed.

* IV Contrast Allergy has not been assessed.

* No Known Drug Allergies.





Medication





Medication reconciliation has not been performed.

## 2018-02-11 NOTE — XMS REPORT
[facility] [[email or web address]], [facility].

 Created on: 2016



Damon  Bob

External Reference #: [id number]

: 1960

Sex: Male



Demographics







 Address  [address] [place]  [address]

 

 Home Phone  [phone]

 

 Preferred Language  Unknown

 

 Marital Status  Unknown

 

 Sabianist Affiliation  Unknown

 

 Race  White

 

 Ethnic Group  Not  or 





Author







 Author  Taty New

 

 Organization  [employer]

 

 Address  Unknown

 

 Phone  Unavailable







Care Team Providers







 Care Team Member Name  Role  Phone

 

 Taty New    Unavailable



                                                                



Allergies

          No Known Allergies                                                   
                                     



Problems

          





 Problem Type  Condition  Code  Onset Dates  Condition Status

 

 Problem  Parkinson's disease  G20     Active

 

 Problem  Schizoaffective disorder, bipolar type  F25.0     Active



                                                                               
                   



Medications

          





 Medication  Code System  Code  Instructions  Start Date  End Date  Status  
Dosage

 

 Seroquel  NDC  [address]-10  50 MG Orally Twice a day  2016        1 
tablet at breakfast and lunch



                                                                              



Results

          No Known Results                                                     
               



Summary Purpose

          eClinicalWorks Submission

## 2018-02-11 NOTE — XMS REPORT
[facility], [facility].

 Created on: 2016



DamonBob

External Reference #: [id number]

: 1960

Sex: Male



Demographics







 Address  [address] [place]  [address]

 

 Home Phone  [phone]

 

 Preferred Language  Unknown

 

 Marital Status  Unknown

 

 Druze Affiliation  Unknown

 

 Race  White

 

 Ethnic Group  Not  or 





Author







 Taty Bartholomew

 

 [facility]  [employer]

 

 Address  Unknown

 

 Phone  Unavailable







Care Team Providers







 Care Team Member Name  Role  Phone

 

 Taty New    Unavailable



                                                                



Allergies

          No Known Allergies                                                   
                                     



Problems

          





 Problem Type  Condition  Code  Onset Dates  Condition Status

 

 Problem  Parkinson's disease  G20     Active

 

 Problem  Schizoaffective disorder, bipolar type  F25.0     Active



                                                                               
                   



Medications

          





 Medication  Code System  Code  Instructions  Start Date  End Date  Status  
Dosage

 

 MiraLax  NDC  [address]-94   Orally Once a day           1 packet mixed with 
8 ounces of fluid

 

 Folic Acid  NDC  [address]-45  1 MG Orally Once a day           1 tablet

 

 Carbidopa-Levodopa  NDC  [address]-54   MG Orally four times a day  Aug 
02, 2016        1 tablet

 

 Lorazepam  NDC  [address]-89  1 MG Orally or injection every 6 hrs PRN       
    1 tablet

 

 Carbidopa-Levodopa ER  NDC  [address]-00   MG Orally daily  Aug 02, 
2016        1 tablet

 

 Aricept  NDC  [address]-05  10 MG Orally Once a day           1 tablet at 
bedtime

 

 Doxycycline Hyclate  NDC  [address]-65  100 MG Orally Twice a day  Aug 02, 
2016  Aug 07, 2016     1 tablet on an empty stomach

 

 Amoxicillin  NDC  [address]-63  500 MG Orally every 8 hrs  Aug 02, 2016  Aug 
06, 2016     1 capsule

 

 Restoril  NDC  [address]-53  15 MG Orally Once a day           1 capsule at 
bedtime as needed

 

 Seroquel  NDC  [address]-52  300 MG Orally Once a day  2016        1 
tablet at bedtime

 

 Dilt-CD  NDC  [address]-34  120 MG Orally Once a day           1 capsule

 

 Paroxetine HCl  NDC  [address]-52  40 MG Orally Once a day           1 tablet 
at bedtime

 

 Nuplazid (pimavanserin)  NDC  [address]-53  17mg oral daily (with or without 
food)  Aug 03, 2016  Oct 02, 2016     2 tablets

 

 Docusate Sodium  NDC  [address]-88  100 MG Orally twice daily           1 
capsule as needed

 

 Tramadol HCl  NDC  [address]-42  50 MG Orally every 6 hrs           1 tablet 
as needed

 

 Depakote  NDC  [address]-54  500 MG Orally Twice a day           1 tablet



                                                                               
                                                                               
                                                                      



Results

          No Known Results                                                     
               



Summary Purpose

          eClinicalWorks Submission

## 2018-02-11 NOTE — XMS REPORT
Summary of Care

 Created on: 2017



LORELEI Jose

External Reference #: [id number]

: 1960

Sex: Male



Demographics







 Address  [address] [place]  [address]

 

 Preferred Language  English

 

 Marital Status  Unknown

 

 Sikhism Affiliation  Unknown

 

 Race  Other Race

 

 Ethnic Group  Unknown





Author







 Author  Dionisio Sinha M.D.

 

 Organization  Unknown

 

 Address  [address] [place] [address] [place]  [address]



 

 Phone  Unavailable







Care Team Providers







 Care Team Member Name  Role  Phone

 

 Dionisio Sinha M.D.  Unavailable  Unavailable

 

 Jani Keenan  Unavailable  Unavailable







Functional Status







 Name  Dates  Details

 

 Functional status health issues are not documented

     Status: 









 Name  Dates  Details

 

 Cognitive status health issues are not documented

     Status: 







Problems







 Name  Dates  Details

 

 Anxiety (300.00, F41.9) 

     Status: Active

 

 Chronic venous stasis dermatitis (454.1, I83.10) 

     Status: Active

 

 Hypertension (401.9, I10) 

     Status: Active

 

 Schizophrenia (295.90, F20.9) 

     Status: Active

 

 Chronic constipation (564.00, K59.09) 

     Status: Active

 

 Vitamin deficiency (269.2, E56.9) 

     Status: Active

 

 Suprapubic pain (789.09, R10.2) 

     Status: Active

 

 Increased urinary frequency (788.41, R35.0) 

     Status: Active

 

 Nocturia (788.43, R35.1) 

     Status: Active

 

 Parkinson disease, symptomatic (332.0, G20) 

     Status: Active

 

 Incomplete bladder emptying (788.21, R33.9) 

     Status: Active







Medications







 Name  Dates  Details









 Aricept 10 MG Oral Tablet

TAKE 1 TABLET DAILY AS DIRECTED.



 











Bharath MOSELEY.ERWIN.Dionisio * 





  Start 2017





Active

Ativan 1 MG Oral Tablet

TAKE 1 TABLET EVERY 6 TO 8 HOURS AS NEEDED NAUSEA.

* 





  Refills: 0









Cho M.D., Dionisio * 





  Start 2017





Active

Colace 100 MG Oral Capsule

TAKE 1 CAPSULE TWICE DAILY AS NEEDED.

* 





  Refills: 0









Cho M.D., Dionisio * 





  Start 2017





Active

Comtan 200 MG Oral Tablet

TAKE 1 TABLET 3 TIMES DAILY.

* 





  Refills: 0









Cho M.D., Dionisio * 





  Start 2017





Active

Divalproex Sodium 500 MG Oral Tablet Delayed Release

TAKE 1 TABLET 3 TIMES DAILY AFTER MEALS.

* 





  Refills: 0









Cho M.D., Dionisio * 





  Start 2017





Active

Folic Acid 1 MG Oral Tablet

ONE TABLET BY MOUTH DAILY

* 





  Quantity: 90   Refills: 1









Cho M.D., Dionisio * 





  Start 2017





Active

MiraLax Oral Powder

MIX 1 CAPFUL (17GM) IN 8 OUNCES OF WATER, JUICE, OR TEA AND DRINK DAILY.

* 





  Quantity: 527   Refills: 11









Cho M.D., Dionisio * 





  Start 2017





Active

PARoxetine HCl - 40 MG Oral Tablet

TAKE 1 TABLET DAILY AS DIRECTED.

* 





  Refills: 0









Cho M.D., Dionisio * 





  Start 2017





Active

Restoril 15 MG Oral Capsule

TAKE 1 CAPSULE AT BEDTIME AS NEEDED FOR SLEEP.

* 





  Refills: 0









Cho M.D., Dionisio * 





  Start 2017





Active

Sinemet  MG Oral Tablet

Take 1 tablet daily

* 





  Refills: 0









Cho M.D., Dionisio * 





  Start 2017





Active

Sinemet CR  MG Oral Tablet Extended Release

TAKE 1 TABLET 3 TIMES DAILY.

* 





  Refills: 0









Cho M.D., Dionisio * 





  Start 2017





Active

Tiazac 120 MG Oral Capsule Extended Release 24 Hour

TAKE 1 CAPSULE EVERY MORNING DAILY.

* 





  Refills: 0









Cho M.D., Dionisio * 





  Start 2017





Active

TraMADol HCl - 50 MG Oral Tablet

TAKE 1 TABLET EVERY 6 HOURS AS NEEDED FOR PAIN.

* 





  Refills: 0









Cho M.D., Dionisio * 





  Start 2017





Active

DiltiaZEM  MG Oral Capsule Extended Release 24 Hour

TAKE 1 CAPSULE ONCE DAILY.

* 





  Refills: 0









Cho M.D., Dionisio * 





  Start 2017





Active

Tylenol 325 MG Oral Tablet

TAKE 1 TO 2 TABLETS EVERY 4 HOURS AS NEEDED

* 





  Refills: 0









Cho M.D., Dionisio * 





  Start 2017





Active





Allergies and Adverse Reactions







 Name  Dates  Details

 

 No Known Drug Allergies (Allergy)     Status: Active









Procedures







 Procedure  Dates  Details

 

 History of Hernia Repair      

 

 Procedures not documented      







Immunization







 Name  Dates  Details

 

 Immunizations not documented

      







Family History







 Name  Dates  Details

 

 Family history of cerebrovascular accident (CVA) (V17.1, Z82.3) 

     Status: Active









 Name  Dates  Details

 

 Family history of cardiac disorder (V17.49, Z82.49) 

     Status: Active

 

 Family history of Coronary artery disease (414.00, I25.10) 

     Status: Active









 Name  Dates  Details

 

 Family history of hypertension (V17.49, Z82.49) 

     Status: Active

 

 Family history of malignant neoplasm of breast (V16.3, Z80.3) 

     Status: Active

 

 Family history of diabetes mellitus (V18.0, Z83.3) 

     Status: Active









 Name  Dates  Details

 

 Family history of hypertension (V17.49, Z82.49) 

     Status: Active

 

 Family history of depression (V17.0, Z81.8) 

     Status: Active

 

 Family history of malignant neoplasm of prostate (V16.42, Z80.42) 

     Status: Active









 Name  Dates  Details

 

 Family history of malignant neoplasm of uterus (V16.49, Z80.49) 

     Status: Active







Social History







 Name  Dates  Details

 

 -

     Status: 









 Name  Dates  Details

 

 Former smoker      







Vital Signs







 Date  Test  Result  Details

 

           

 

 2017 13:10

  BP Systolic  116 mm[Hg]  Status: Comments: Location:  ; Position:  











 BP Diastolic  60 mm[Hg]  Status: Comments: Location:  ; Position:  



 

 Heart Rate  72 /min  Status: Comments: Location:  ; 



 

 Weight  196 lb  Status: 









Results







 Date  Description  Value  Details

 

 6-Mar-2017 16:40  CT AB/ PEL WITHOUT IV CONTRAST (FOR KIDNEY STONE)   Comments
: Exam Date: 2017 14:27Dictation Date: 2017 16:40



 

    XC ABD PEL W/O (RENAL STONE)     







Plan of Care







 Name  Dates  Details

 

 Planned Observations 

 

 Planned Goals not documented      

 

 Planned Encounters 









 Appointment; Provider: Dionisio Sinha M.D.

  On 20-Mar-2017

 14:30







Instructions







 Name  Dates  Details

 

 Instructions not documented

      







Encounters







 Appointment; Dionisio Sinha M.D. 

Encounter Diagnosis: Problem not documented

  On 2017

 13:15

 

 Appointment; Dionisio Sinha M.D. 

Encounter Diagnosis: Problem not documented

  On 2017

 13:15

## 2018-02-11 NOTE — XMS REPORT
Summary of Care

 Created on: 2017



LORELEI Jose

External Reference #: [id number]

: 1960

Sex: Male



Demographics







 Address  [address] [place]  [address]

 

 Preferred Language  English

 

 Marital Status  Unknown

 

 Worship Affiliation  Unknown

 

 Race  Other Race

 

 Ethnic Group  Unknown





Author







 Author  Dionisio Sinha M.D.

 

 Organization  Unknown

 

 Address  [address] [place] [address] [place]  [address]



 

 Phone  Unavailable







Care Team Providers







 Care Team Member Name  Role  Phone

 

 Dionisio Sinha M.D.  Unavailable  Unavailable

 

 Jani Keenan  Unavailable  Unavailable







Functional Status







 Name  Dates  Details

 

 Functional status health issues are not documented

     Status: 









 Name  Dates  Details

 

 Cognitive status health issues are not documented

     Status: 







Problems







 Name  Dates  Details

 

 Parkinson disease, symptomatic (332.0, G20) 

     Status: Active

 

 Anxiety (300.00, F41.9) 

     Status: Active

 

 Chronic venous stasis dermatitis (454.1, I83.10) 

     Status: Active

 

 Hypertension (401.9, I10) 

     Status: Active

 

 Schizophrenia (295.90, F20.9) 

     Status: Active

 

 Chronic constipation (564.00, K59.09) 

     Status: Active

 

 Vitamin deficiency (269.2, E56.9) 

     Status: Active

 

 Suprapubic pain (789.09, R10.2) 

     Status: Active

 

 Increased urinary frequency (788.41, R35.0) 

     Status: Active

 

 Nocturia (788.43, R35.1) 

     Status: Active







Medications







 Name  Dates  Details









 Aricept 10 MG Oral Tablet

TAKE 1 TABLET DAILY AS DIRECTED.



 











Bharath MOSELEY.Dionisio HILL 





  Start 2017





Active

Ativan 1 MG Oral Tablet

TAKE 1 TABLET EVERY 6 TO 8 HOURS AS NEEDED NAUSEA.

* 





  Refills: 0









Cho M.D., Dionisio [place] 





  Start 2017





Active

Colace 100 MG Oral Capsule

TAKE 1 CAPSULE TWICE DAILY AS NEEDED.

* 





  Refills: 0









Cho M.D., Dionisio [place] 





  Start 2017





Active

Comtan 200 MG Oral Tablet

TAKE 1 TABLET 3 TIMES DAILY.

* 





  Refills: 0









Cho M.D., Dionisio [place] 





  Start 2017





Active

Divalproex Sodium 500 MG Oral Tablet Delayed Release

TAKE 1 TABLET 3 TIMES DAILY AFTER MEALS.

* 





  Refills: 0









Cho M.D., Dionisio [place] 





  Start 2017





Active

Folic Acid 1 MG Oral Tablet

ONE TABLET BY MOUTH DAILY

* 





  Quantity: 90   Refills: 1









Cho M.D., Dionisio * 





  Start 2017





Active

MiraLax Oral Powder

MIX 1 CAPFUL (17GM) IN 8 OUNCES OF WATER, JUICE, OR TEA AND DRINK DAILY.

* 





  Quantity: 527   Refills: 11









Cho M.D., Dionisio * 





  Start 2017





Active

PARoxetine HCl - 40 MG Oral Tablet

TAKE 1 TABLET DAILY AS DIRECTED.

* 





  Refills: 0









Cho M.D., Dionisio * 





  Start 2017





Active

Restoril 15 MG Oral Capsule

TAKE 1 CAPSULE AT BEDTIME AS NEEDED FOR SLEEP.

* 





  Refills: 0









Cho M.D., Dionisio * 





  Start 2017





Active

Sinemet  MG Oral Tablet

Take 1 tablet daily

* 





  Refills: 0









Cho M.D., Dionisio * 





  Start 2017





Active

Sinemet CR  MG Oral Tablet Extended Release

TAKE 1 TABLET 3 TIMES DAILY.

* 





  Refills: 0









Cho M.D., Dionisio * 





  Start 2017





Active

Tiazac 120 MG Oral Capsule Extended Release 24 Hour

TAKE 1 CAPSULE EVERY MORNING DAILY.

* 





  Refills: 0









Cho M.D., Dionisio [place] 





  Start 2017





Active

TraMADol HCl - 50 MG Oral Tablet

TAKE 1 TABLET EVERY 6 HOURS AS NEEDED FOR PAIN.

* 





  Refills: 0









Cho M.D., Dionisio [place] 





  Start 2017





Active

DiltiaZEM  MG Oral Capsule Extended Release 24 Hour

TAKE 1 CAPSULE ONCE DAILY.

* 





  Refills: 0









Cho M.D., Dionisio [place] 





  Start 2017





Active

Tylenol 325 MG Oral Tablet

TAKE 1 TO 2 TABLETS EVERY 4 HOURS AS NEEDED

* 





  Refills: 0









Cho M.D., Dionisio [place] 





  Start 2017





Active





Allergies and Adverse Reactions







 Name  Dates  Details

 

 No Known Drug Allergies (Allergy)     Status: Active









Procedures







 Procedure  Dates  Details

 

 History of Hernia Repair      

 

 Procedures not documented      







Immunization







 Name  Dates  Details

 

 Immunizations not documented

      







Family History







 Name  Dates  Details

 

 Family history of cerebrovascular accident (CVA) (V17.1, Z82.3) 

     Status: Active









 Name  Dates  Details

 

 Family history of cardiac disorder (V17.49, Z82.49) 

     Status: Active

 

 Family history of Coronary artery disease (414.00, I25.10) 

     Status: Active









 Name  Dates  Details

 

 Family history of hypertension (V17.49, Z82.49) 

     Status: Active

 

 Family history of malignant neoplasm of breast (V16.3, Z80.3) 

     Status: Active

 

 Family history of diabetes mellitus (V18.0, Z83.3) 

     Status: Active









 Name  Dates  Details

 

 Family history of hypertension (V17.49, Z82.49) 

     Status: Active

 

 Family history of depression (V17.0, Z81.8) 

     Status: Active

 

 Family history of malignant neoplasm of prostate (V16.42, Z80.42) 

     Status: Active









 Name  Dates  Details

 

 Family history of malignant neoplasm of uterus (V16.49, Z80.49) 

     Status: Active







Social History







 Name  Dates  Details

 

 -

     Status: 









 Name  Dates  Details

 

 Former smoker      







Vital Signs







 Date  Test  Result  Details

 

           

 

 2017 13:10

  BP Systolic  116 mm[Hg]  Status: Comments: Location:  ; Position:  











 BP Diastolic  60 mm[Hg]  Status: Comments: Location:  ; Position:  



 

 Heart Rate  72 /min  Status: Comments: Location:  ; 



 

 Weight  196 lb  Status: 









Results







 Date  Description  Value  Details

 

   Results not documented      

 

           







Plan of Care







 Name  Dates  Details

 

 Planned Observations 

 

 Planned Goals not documented      

 

 Planned Encounters 









 Appointment; Provider: Dionisio Sinha M.D.

  On 2017

 13:15







Instructions







 Name  Dates  Details

 

 Instructions not documented

      







Encounters







 Appointment; Dionisio Sinha M.D. 

Encounter Diagnosis: Problem not documented

  On 2017

 13:15

## 2018-02-11 NOTE — XMS REPORT
Transfer Level of Care 2016 18:00

 Created on: 2016



MARCIA HENDERSON

External Reference #: [id number]

: 1960

Sex: Male



Demographics







 Address  [address] [place] [address]  [address]

 

 Home Phone  [phone]

 

 Preferred Language  English

 

 Marital Status  Unknown

 

 Presybeterian Affiliation  Unknown

 

 Race  White

 

 Ethnic Group  Unknown





Author







 Author  GENERATED,  SYSTEM

 

 Organization  Unknown

 

 Address  Unknown

 

 Phone  Unavailable







Care Team Providers







 Care Team Member Name  Role  Phone

 

  PP  Unavailable







Reason For Visit







Chief Complaint





No relevant chief complaints exist.



Social History







Functional Status







Vital Signs







Results





Problems

Encounter Diagnosis





No relevant problems exist.



Encounters

Encounter Diagnosis

No relevant problems exist.



Plan of Care







Procedures





No relevant procedures performed.



Immunizations





No immunizations administered or ordered.



Hospital Course





Hospital Discharge Instructions







Allergies, Adverse Reactions, Alerts

* Latex Allergy has not been assessed.

* IV Contrast Allergy has not been assessed.





Medication





Medication reconciliation has not been performed.

## 2018-02-11 NOTE — PROGRESS NOTE (SOAP)
Subjective


Date Seen by Provider:  2018


Time Seen by Provider:  16:48


Subjective/Events-last exam


Pt ELLY, pain controlled, no complaints





Objective


Exam





Vital Signs








  Date Time  Temp Pulse Resp B/P (MAP) Pulse Ox O2 Delivery O2 Flow Rate FiO2


 


18 16:00 99.6 102 17 137/77 (97) 96 Room Air  


 


18 12:00 99.4 113 16 155/80 (105) 95 Room Air  


 


18 09:00      Room Air  


 


18 08:00 99.0 112 16 148/81 (103) 93 Room Air  


 


18 04:00 99.5 112 20 168/80 (109) 95 Room Air  


 


18 00:00 98.2 103 12 138/77 (97) 93 Room Air  


 


2/10/18 21:00      Room Air  


 


2/10/18 19:32 98.4 98 16 131/69 (89) 93 Room Air  














I & O 


 


 18





 06:59


 


Intake Total 2390 ml


 


Output Total 2025 ml


 


Balance 365 ml





Capillary Refill : Less Than 3 SecondsLess Than 3 Seconds


General Appearance:  No Apparent Distress


HEENT:  Normal ENT Inspection


Neck:  Normal Inspection


Respiratory:  No Accessory Muscle Use, No Respiratory Distress


Cardiovascular:  Regular Rate, Rhythm, Normal Peripheral Pulses


Gastrointestinal:  non tender, soft


Extremity:  Other (LLE: dressings left hip c/d/i, all compartments soft, motor/

sensation grossly intact, foot well perfused.)





Results


Lab


Laboratory Tests


18 05:13: 


Hemoglobin 12.3L, Hematocrit 35L





Microbiology


18 Blood Culture - Preliminary, Resulted


         No growth


18 MRSA Screen - Final, Complete





Assessment/Plan


Assessment/Plan


Assess & Plan/Chief Complaint


A/P:


Displaced fracture Left femoral neck secondary mechanical GLF, s/p bipolar 

hemiarthroplasty Left hip POD #1


Post-op pelvic XR reviewed, stable Left hip prosthesis


Orthopedically stable


PT/OT, WBAT LLE, anterior hip precautions as able


Current pain control regimen


Xarelto 10mg q day for 3 weeks for VTE prophylaxis


Case management for d/c planning





Clinical Quality Measures


DVT/VTE Risk/Contraindication:


Risk Factor Score Per Nursin


RFS Level Per Nursing on Admit:  4+=Very High











NEVILLE GILMORE DO 2018 16:54

## 2018-02-11 NOTE — XMS REPORT
Transition of Care/Referral Summary

 Created on: 2038



ROMULO HENDERSON

External Reference #: [id number]

: 1960

Sex: Male



Demographics







 Address  [address]

[place]  [address]

 

 Home Phone  [phone]

 

 Preferred Language  English

 

 Marital Status  Single

 

 Latter-day Affiliation  Unspecified Yazdanism

 

 Race  White

 

 Ethnic Group  Not  or 





Author







 Author  [facility]

 

 Organization  [facility]

 

 Address  Unknown

 

 Phone  Unavailable







Care Team Providers







 Care Team Member Name  Role  Phone

 

 Jani Keenan  PCP  [phone]







Encounter





[facility] FIN [id number] Date(s): 9/14/15 - 9/14/15

[facility] [address] [place] [address]      [phone]

Discharge Diagnosis: Schizophrenia

Discharge Diagnosis: Parkinson's disease

Discharge Diagnosis: Hypertension

Discharge Disposition: 01-Home or Self Care

Attending Physician: Jani Keenan MD

Admitting Physician: Jani Keenan MD





Vital Signs







 



  Most recent to   1



  oldest [Reference 



  Range]: 

 

 



  Temperature Tympanic   36.5 degC



  [36.6-38.1 degC]   *LOW*



   (9/14/15 10:12 AM)

 

 



  Peripheral Pulse   66 bpm



  Rate [ bpm]   (9/14/15 10:12 AM)

 

 



  Blood Pressure   118/64 mmHg



  [/60-90 mmHg]   (9/14/15 10:12 AM)







Problem List







    



  Condition   Effective Dates   Status   Health Status   Informant

 

    



  Anxiety state    Active  



  (finding)(Confirmed)    

 

    



  Anxiety(Confirmed)   < 14   Resolved  

 

    



  Hypertension(Confirm    Active  



  ed)    

 

    



  Parkinson's(Confirme   < 14   Resolved  



  d)    

 

    



  Parkinson's disease    Active  



  (disorder)(Confirmed    



  )    

 

    



  Schizophrenia    Active  



  (disorder)(Confirmed    



  )    

 

    



  Schizophrenia(Confir   < 14   Resolved  



  med)    







Allergies, Adverse Reactions, Alerts





No Known Medication Allergies



Medications





amantadine 100 mg oral capsule

1 caps, Oral, Daily, # 30 caps, 0 Refill(s)

Start Date: 3/30/15

Status: Ordered



Aricept 10 mg oral tablet

1 tabs, Oral, Bedtime (once a day), # 30 tabs, 0 Refill(s)

Start Date: 3/30/15

Status: Ordered



Ativan 1 mg oral tablet

1 mg 1 tabs, Oral, q6hr, as needed for anxiety, 0 Refill(s)

Start Date: 16

Status: Ordered



Colace 100 mg oral capsule

100 mg 1 caps, Oral, BID, as needed for constipation, # 20 caps, 0 Refill(s)

Start Date: 16

Status: Ordered



Comtan 200 mg oral tablet

See Instructions, Take 1 tablet (200 MG) by oral route 4 times every day in 
combination with carbidopa and levodopa, 0 Refill(s)

Start Date: 14

Status: Ordered



Depakote 500 mg oral delayed release tablet

500 mg 1 tabs, Oral, BID, 0 Refill(s)

Start Date: 9/28/15

Status: Ordered



folic acid 1 mg oral tablet

1 mg 1 tabs, Oral, Daily, 0 Refill(s)

Start Date: 9/28/15

Status: Ordered



Milk of Magnesia

30 mL, Oral, Bedtime (once a day), 0 Refill(s)

Start Date: 16

Status: Ordered



MiraLax oral powder for reconstitution

17 g, Oral, Daily, dissolve in water before taking, # 255 g, 0 Refill(s)

Start Date: 16

Status: Ordered



Paxil 40 mg oral tablet

40 mg 1 tabs, Oral, Daily, 0 Refill(s)

Start Date: 9/28/15

Status: Ordered



QUEtiapine 100 mg oral tablet

100 mg 1 tabs, Oral, Daily, 0 Refill(s)

Start Date: 16

Status: Ordered



Restoril 15 mg oral capsule

15 mg 1 caps, Oral, Bedtime (once a day), as needed for sleep, 0 Refill(s)

Start Date: 16

Status: Ordered



SEROquel 300 mg oral tablet

300 mg 1 tabs, Oral, Daily, 0 Refill(s)

Start Date: 16

Status: Ordered



Sinemet 25 mg-100 mg oral tablet

2 tabs, Oral, QID, 0 Refill(s)

Start Date: 14

Status: Ordered



Sinemet CR 50 mg-200 mg oral tablet, extended release

See Instructions, Take 1 tablet by oral route at 10 am and 6 pm., 0 Refill(s)

Start Date: 14

Status: Ordered



Tiazac 120 mg/24 hours oral capsule, extended release

See Instructions, TAKE ONE CAPSULE BY MOUTH EVERY MORNING AT 0800, # 28 caps, 0 
Refill(s), Pharmacy: [facility], TAKE ONE CAPSULE BY MOUTH EVERY 
MORNING AT 0800

Start Date: 14

Status: Ordered



traMADol 50 mg oral tablet

50 mg 1 tabs, Oral, q6hr, [facility] [phone], # 120 tabs, 0 Refill(s)

Start Date: 16

Status: Ordered



trihexyphenidyl 2 mg oral tablet

1 tabs, Oral, BID, 0 Refill(s)

Start Date: 14

Status: Ordered



Results





No data available for this section



Immunizations







  



  Vaccine   Date   Refusal Reason

 

  



  tetanus/diphth/pertuss (Tdap) adult/adol   5/25/10 

 

  



  pneumococcal 23-polyvalent vaccine   5/25/10 







Procedures







   



  Procedure   Date   Related Diagnosis   Body Site

 

   



  Hernia repair     







Social History







 



  Social History Type   Response

 

 



  Smoking Status   Former smoker1







1dc 



Assessment and Plan

Extracted from:





  



  Title: Office Visit Note   Author: Jani Keenan MD   Date: 9/14/15









  Assessment/Plan

 Hypertension

 Parkinson's disease

 Schizophrenia

 My overall perception is that things are pretty stable. Many of these same 
concerns were voiced at his previous visits and I'm not sure how many are new 
or different. Stand feels that he is tone down his behaviors after tree 
talked to him about the inappropriateness of these. We decided not to do any 
new medication at this time. Rather, we'll observe and hopefully the 
counseling/talking to will help him to control behaviors.



 I think he should have ongoing psychiatric care and neurology care. I 
encouraged appointment set both [facility] and with Dr. Montoya.



 We will send a copy of this dictation to [facility], Dr. Montoya, 
and 2 per

## 2018-02-11 NOTE — ANESTHESIA-GENERAL POST-OP
General


Patient Condition


Mental Status/LOC:  Same as Preop


Cardiovascular:  Satisfactory


Nausea/Vomiting:  Absent


Respiratory:  Satisfactory


Pain:  Controlled


Complications:  Absent





Post Op Complications


Complications


None





Follow Up Care/Instructions


Patient Instructions


None needed.





Anesthesia/Patient Condition


Patient Condition


Patient is sleeping upon me entering the room. Appears to be comfortable and 

without acute distress. From report, I know the patient has a history of 

schizophrenia and dementia, thus has a hard time communicating. Rather than 

wake the patient up, if I was unlikely to gather much information from talking 

with him, I spoke to HERMILO Fung who has taken care of him today. She states 

the patient doesn't appear to be in pain but is stiff and PT helped her get him 

up to the side of the bed and feed him, but they were unable to do other 

therapy with him today. She confirmed that it would not be helpful to wake him 

up at this time. We will be available for further consultation if needed.











JOSELINE VANN CRNA Feb 11, 2018 15:33

## 2018-02-11 NOTE — XMS REPORT
[facility], [facility].

 Created on: 2016



DamonBob

External Reference #: [id number]

: 1960

Sex: Male



Demographics







 Address  [address]

[place]  [address]

 

 Home Phone  [phone]

 

 Preferred Language  Unknown

 

 Marital Status  Unknown

 

 Anabaptism Affiliation  Unknown

 

 Race  White

 

 Ethnic Group  Not  or 





Author







 Taty Bartholomew

 

 [facility]  eClinicalWorks

 

 Address  Unknown

 

 Phone  Unavailable







Care Team Providers







 Care Team Member Name  Role  Phone

 

 Taty New    Unavailable



                                                                



Allergies

          No Known Allergies                                                   
                                     



Problems

          





 Problem Type  Condition  Code  Onset Dates  Condition Status

 

 Problem  Parkinson's disease  G20     Active

 

 Problem  Schizoaffective disorder, bipolar type  F25.0     Active



                                                                               
                   



Medications

          





 Medication  Code System  Code  Instructions  Start Date  End Date  Status  
Dosage

 

 Folic Acid  NDC  [address]-72  1 MG Orally Once a day           1 tablet

 

 MiraLax  NDC  [address]-31   Orally Once a day           1 packet mixed with 
8 ounces of fluid

 

 Tramadol HCl  NDC  [address]-91  50 MG Orally every 6 hrs           1 tablet 
as needed

 

 Restoril  NDC  [address]-68  15 MG Orally Once a day           1 capsule at 
bedtime as needed

 

 Carbidopa-Levodopa  NDC  [address]-28   MG Orally four times a day  Aug 
02, 2016        1 tablet

 

 Dilt-CD  NDC  [address]-56  120 MG Orally Once a day           1 capsule

 

 Nuplazid (pimavanserin)  NDC  [address]-41  17mg oral daily (with or without 
food)  Aug 03, 2016  Oct 02, 2016     2 tablets

 

 Docusate Sodium  NDC  [address]-16  100 MG Orally twice daily           1 
capsule as needed

 

 Lorazepam  NDC  [address]-46  1 MG Orally or injection every 6 hrs PRN       
    1 tablet

 

 Doxycycline Hyclate  NDC  [address]-43  100 MG Orally Twice a day  Aug 02, 
2016  Aug 07, 2016     1 tablet on an empty stomach

 

 Carbidopa-Levodopa ER  NDC  [address]-85   MG Orally daily  Aug 02, 
2016        1 tablet

 

 Paroxetine HCl  NDC  [address]-44  40 MG Orally Once a day           1 tablet 
at bedtime

 

 Depakote  NDC  [address]-95  500 MG Orally Twice a day           1 tablet

 

 Amoxicillin  NDC  [address]-77  500 MG Orally every 8 hrs  Aug 02, 2016  Aug 
06, 2016     1 capsule

 

 Aricept  NDC  [address]-67  10 MG Orally Once a day           1 tablet at 
bedtime

 

 Seroquel  NDC  [address]-06  300 MG Orally Once a day  2016        1 
tablet at bedtime



                                                                               
                                                                               
                                                                      



Results

          No Known Results                                                     
               



Summary Purpose

          eClinicalWorks Submission

## 2018-02-11 NOTE — XMS REPORT
Continuity of Care Document

 Created on: 2018



ROMULO HENDERSON

External Reference #: [id number]

: 1960

Sex: Male



Demographics







 Address  [facility] [address]

[place], [place]  [id number]

 

 Home Phone  [phone] x

 

 Preferred Language  Unknown

 

 Marital Status  Unknown

 

 Confucianism Affiliation  Unknown

 

 Race  Unknown

 

 Ethnic Group  Unknown





Author







 Author  [facility]

 

 Organization  [facility]

 

 Address  Unknown

 

 Phone  Unavailable



              



Allergies

      





 Active            Description            Code            Type            
Severity            Reaction            Onset            Reported/Identified   
         Relationship to Patient            Clinical Status        

 

 Yes            No Known Medication Allergies                         [facility]     
       N/A            N/A                         2014                   
               



                  



Medications

      



There is no data.                  



Problems

      





 Date Dx Coded            Attending            Type            Code            
Diagnosis            Diagnosed By        

 

 2016            Madai Keenan            Final            F20.9    
        Schizophrenia, unspecified                     

 

 2016            Madai Keenan            Final            G20      
      Parkinson''s disease                     

 

 2016            Madai Keenan            Final            I83.10   
         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       
              

 

 2016            Madai Keenan            Final            L03.115  
          Cellulitis of right lower limb                     

 

 2016            Madai Keenan            Final            F20.9    
        Schizophrenia, unspecified                     

 

 2016            Madai Keenan            Final            G20      
      Parkinson''s disease                     

 

 2016            Madai Keenan            Final            L89.322  
          Pressure ulcer of left buttock, stage 2                     

 

 2016            MADAI KEENAN            I872         
   Venous insufficiency (chronic) (peripheral)                     

 

 2016            MADAI KEENAN            R600         
   Localized edema                     

 

 2016            Madai Keenan            Final            F20.9    
        Schizophrenia, unspecified                     

 

 2016            Madai Keenan            Final            G20      
      Parkinson''s disease                     

 

 2016            Madai Keenan            Final            K59.09   
         Other constipation                     

 

 2016            Madai Keenan            Final            R35.0    
        Frequency of micturition                     

 

 2017            Madai Keenan            Final            F20.9    
        Schizophrenia, unspecified                     

 

 2017            Madai Keenan            Final            F41.1    
        Generalized anxiety disorder                     

 

 2017            Madai Keenan            Final            G20      
      Parkinson''s disease                     

 

 2017            Madai Keenan V            Final            I10      
      Essential (primary) hypertension                     

 

 2017            Madai Keenan            Final            L81.9    
        Disorder of pigmentation, unspecified                     

 

 2017            Madai Keenan            Final            Z51.81   
         Encounter for therapeutic drug level monitoring                     

 

 2017            Madai Keenan            Final            F20.9    
        Schizophrenia, unspecified                     

 

 2017            Madai Keenan            Final            G20      
      Parkinson''s disease                     

 

 2017            Madai Keenan            Final            S63.502A 
           Unspecified sprain of left wrist, initial encounter                 
    

 

 2017            Madai Keenan            Final            F03.90   
         Unspecified dementia without behavioral disturbance                   
  

 

 2017            MOOSE FERRARO            D696            
Thrombocytopenia, unspecified                     

 

 2017            MOOSE FERRARO            E870            
Hyperosmolality and hypernatremia                     

 

 2017            MOOSE FERRARO                        
Dementia in oth diseases classd elswhr w/o behavrl disturb                     

 

 2017            MOOSE FERRARO            F209            
Schizophrenia, unspecified                     

 

 2017            MOOSE FERRARO            F329            
Major depressive disorder, single episode, unspecified                     

 

 2017            MOOSE FERRARO            F419            
Anxiety disorder, unspecified                     

 

 2017            MOOSE FERRARO            F919            
Conduct disorder, unspecified                     

 

 2017            MOOSE FERRARO            G20            
Parkinson's disease                     

 

 2017            MOOSE FERRARO            I119            
Hypertensive heart disease without heart failure                     

 

 2017            MOOSE FERRARO            J123            
Human metapneumovirus pneumonia                     

 

 2017            MOOSE FERRARO            J159            
Unspecified bacterial pneumonia                     

 

 2017            MOOSE FERRARO            Z23            
Encounter for immunization                     

 

 2017            MOOSE FERRARO            D696            
Thrombocytopenia, unspecified                     

 

 2017            MOOSE FERRARO            E870            
Hyperosmolality and hypernatremia                     

 

 2017            MOOSE FERRARO                        
Dementia in oth diseases classd elswhr w/o behavrl disturb                     

 

 2017            MOOSE FERRARO            F209            
Schizophrenia, unspecified                     

 

 2017            MOOSE FERRARO            F329            
Major depressive disorder, single episode, unspecified                     

 

 2017            MOOSE FERRARO            F419            
Anxiety disorder, unspecified                     

 

 2017            MOOSE FERRARO            F919            
Conduct disorder, unspecified                     

 

 2017            MOOSE FERRARO            G20            
Parkinson's disease                     

 

 2017            MOOSE FERRARO            I119            
Hypertensive heart disease without heart failure                     

 

 2017            MOOSE FERRARO            J123            
Human metapneumovirus pneumonia                     

 

 2017            MOOSE FERRARO            J159            
Unspecified bacterial pneumonia                     

 

 2017            MOOSE FERRARO            Z23            
Encounter for immunization                     



                                                                               
                               



Procedures

      





 Code            Description            Performed By            Performed On   
     

 

             06599                                  Office or other outpatient 
visit for the evaluation and management of an established patient, which 
requires at least 2 of these 3 key components: A detailed history; A detailed 
examination; Medical d                                   [id number]                                  Office or other outpatient 
visit for the evaluation and management of an established patient, which 
requires at least 2 of these 3 key components: An expanded problem focused 
history; An expanded prob                                   [id number]                                  Office or other outpatient 
visit for the evaluation and management of an established patient, which 
requires at least 2 of these 3 key components: An expanded problem focused 
history; An expanded prob                                   2016        

 

             54824                                  Excision, benign lesion 
including margins, except skin tag (unless listed elsewhere), trunk, arms or 
legs; excised diameter 1.1 to 2.0 cm                                   2017        

 

             26982                                  Level IV - Surgical 
pathology, gross and microscopic examination  - spontaneous/missed 
Artery, biopsy Bone marrow, biopsy Bone exostosis Brain/meninges, other than 
for tumor resection Breast,                                   [id number]                                  Office or other outpatient 
visit for the evaluation and management of an established patient, which 
requires at least 2 of these 3 key components: A detailed history; A detailed 
examination; Medical d                                   2017        

 

             56793                                  Radiologic examination, 
wrist; complete, minimum of 3 views                                   [id number]                                  Office or other outpatient 
visit for the evaluation and management of an established patient, which 
requires at least 2 of these 3 key components: A detailed history; A detailed 
examination; Medical d                                   2017        

 

             [id number]                                                           
          2017        



                                  



Results

      





 Test            Result            Range        









 URINALYSIS (CULTURE PRN) - 16 16:05         









 *URINE APPEARANCE            CLEAR             CLEAR        

 

 *URINE BILIRUBIN            NEGATIVE             NEGATIVE        

 

 *URINE BLOOD            NEGATIVE             NEGATIVE        

 

 *URINE GLUCOSE            NEGATIVE             NEGATIVE        

 

 *URINE KETONES            TRACE             NEGATIVE        

 

 *URINE LEUKOCYTES            NEGATIVE             NEGATIVE        

 

 *URINE NITRITES            NEGATIVE             NEGATIVE        

 

 URINE PH            6.0             5.0-8.0        

 

 *URINE PROTEIN            NEGATIVE             NEGATIVE        

 

 URINE SPECIFIC GRAVITY            >1.030             <=1.005->=1.030        

 

 *URINE UROBILINOGEN            0.2             0.2-1.0        

 

 *URINE COLOR            YELLOW             STRAW/YELL/DK YELL        









 URINALYSIS (CULTURE PRN) - 16 15:30         









 *URINE APPEARANCE            CLOUDY             CLEAR        

 

 *URINE BILIRUBIN            NEGATIVE             NEGATIVE        

 

 *URINE BLOOD            NEGATIVE             NEGATIVE        

 

 *URINE GLUCOSE            NEGATIVE             NEGATIVE        

 

 *URINE KETONES            15             NEGATIVE        

 

 *URINE LEUKOCYTES            NEGATIVE             NEGATIVE        

 

 *URINE NITRITES            NEGATIVE             NEGATIVE        

 

 URINE PH            7.5             5.0-8.0        

 

 *URINE PROTEIN            NEGATIVE             NEGATIVE        

 

 URINE SPECIFIC GRAVITY            1.020             <=1.005->=1.030        

 

 *URINE UROBILINOGEN            1.0             0.2-1.0        

 

 *URINE COLOR            YELLOW             STRAW/YELL/DK YELL        









 CULTURE URINE - 16 15:30         









 CULTURE URINE            No growth at 48 hrs             NRG        









 URINALYSIS, AUTOMATED WITH MICROSCOPY - 17 10:20         









 *URINE APPEARANCE            CLEAR             CLEAR        

 

 *URINE BILIRUBIN            NEGATIVE             NEGATIVE        

 

 *URINE BLOOD            NEGATIVE             NEGATIVE        

 

 *URINE GLUCOSE            NEGATIVE             NEGATIVE        

 

 *URINE KETONES            TRACE             NEGATIVE        

 

 *URINE LEUKOCYTES            NEGATIVE             NEGATIVE        

 

 *URINE NITRITES            NEGATIVE             NEGATIVE        

 

 URINE PH            6.0             5.0-8.0        

 

 *URINE PROTEIN            NEGATIVE             NEGATIVE        

 

 URINE SPECIFIC GRAVITY            >1.030             <=1.005->=1.030        

 

 *URINE UROBILINOGEN            1.0             0.2-1.0        

 

 *URINE COLOR            STRAW             STRAW/YELL/DK YELL        









 VALPROIC ACID - 17 08:54         









 VALPROIC ACID            46                     









 LIPID PANEL W/O REFLEX - 17 14:45         









 TRIGLYCERIDES            73             0-149        

 

 CHOLESTEROL            140                     

 

 HDL CHOLESTEROL            49             40-60        

 

 LDL (CALCULATED CHOL            76             0-99        









 CBC WITH PLATELET AND DIFFERENTIAL - 17 22:00         









 SEGS            80.5 %            NRG        

 

 *BASOPHILS            0.7 %            NRG        

 

 *EOSINOPHILS            0.0 %            NRG        

 

 AUTOMATED DIFF            PERFORMED             NRG        

 

 *LYMPHOCYTES            5.8 %            NRG        

 

 *MONOCYTES            13.0 %            NRG        

 

 *ABSOLUTE BASOPHILS            0.10 10*3/uL            0.00-0.20        

 

 *ABSOLUTE EOSINOPHILS            0.00 10*3/uL            0.00-0.50        

 

 *ABSOLUTE LYMPHOCYTES            0.40 10*3/uL            1.00-3.00        

 

 *ABSOLUTE MONOCYTES            1.00 10*3/uL            0.30-1.00        

 

 *ABSOLUTE NEUTROPHILS            6.00 10*3/uL            1.80-7.80        

 

 MPV            9.7 fL            7.4-10.4        

 

 PLATELETS            145 10*3/uL            159-386        

 

 WBC            7.4 10*3/uL            3.6-11.2        

 

 RBC            4.38             4.06-5.63        

 

 HEMOGLOBIN            13.8             12.5-16.3        

 

 HEMATOCRIT            40.0 %            36.7-47.1        

 

 MCV            91.4 fL            80.0-100.0        

 

 MCH            31.5 pg            27.0-33.0        

 

 MCHC            34.5             32.0-36.0        

 

 RDW            13.8 %            12.3-17.0        

 

 RDWSD            44.2             37.1-47.8        









 COMPREHENSIVE METABOLIC PANEL - 17 22:00         









 BILIFUBIN TOTAL            0.40             0.20-1.00        

 

 TOTAL PROTEIN            6.8             6.4-8.2        

 

 ALBUMIN            3.5             3.4-5.0        

 

 *GLOBULIN            3.3             2.3-3.5        

 

 *A/G RATIO            1.1             1.5-2.2        

 

 ALK PHOS            59 U/L                    

 

 ALT (SGPT)            9 U/L            14-59        

 

 AST (SGOT)            21 U/L            15-37        









 GFR ESTIMATION - 17 22:00         









 *GFR EST NON AFR AMERICAN            >90 mL/min            NRG        

 

 *GRFA EST AFR AMER            >90 mL/min            NRG        









 DRUG SCREEN PLASMA - 17 22:00         









 ALCOHOL            <0.003             NRG        

 

 ACETAMINOPHEN            <2             10-30        

 

 SALICYLATE            1.6             2.8-20.0        









 TSH - 17 22:00         









 TSH            0.758 u[IU]/L            0.340-4.820        









 MAGNESIUM - 17 22:00         









 MAGNESIUM            1.7             1.8-2.4        









 LIPASE - 17 22:00         









 LIPASE            131 U/L                    









 URINALYSIS (CULTURE PRN) - 17 00:25         









 *URINE APPEARANCE            CLEAR             CLEAR        

 

 *URINE BILIRUBIN            NEGATIVE             NEGATIVE        

 

 *URINE BLOOD            TRACE-INTACT             NEGATIVE        

 

 *URINE GLUCOSE            NEGATIVE             NEGATIVE        

 

 *URINE KETONES            TRACE             NEGATIVE        

 

 *URINE LEUKOCYTES            NEGATIVE             NEGATIVE        

 

 *URINE NITRITES            NEGATIVE             NEGATIVE        

 

 URINE PH            7.0             5.0-8.0        

 

 *URINE PROTEIN            NEGATIVE             NEGATIVE        

 

 URINE SPECIFIC GRAVITY            1.015             <=1.005->=1.030        

 

 *URINE UROBILINOGEN            0.2             0.2-1.0        

 

 *URINE COLOR            YELLOW             STRAW/YELL/DK YELL        









 URINE MICROSCOPIC - 17 00:25         









 WBC            0-1 /[HPF]            0-5        

 

 RBC            0-1 /[HPF]            0-1        

 

 MUCOUS THREADS            FEW /[LPF]            NEGATIVE        

 

 MICROSCOPIC EXAM PERFORMED            PERFORMED             [facility]        









 LACTIC ACID - 17 00:40         









 LACTIC ACID            0.8             0.9-1.7        









 CULTURE BLOOD - 17 00:40         









 CULTURE BLOOD            No growth after 5 days of incubation.             NRG
        









 CULTURE BLOOD - 17 00:52         









 CULTURE BLOOD            No growth after 5 days of incubation.             NRG
        









 GFR ESTIMATION - 17 07:06         









 *GFR EST NON AFR AMERICAN            >90 mL/min            NRG        

 

 *GRFA EST AFR AMER            >90 mL/min            NRG        









 RENAL FUNCTION PANEL - 17 07:06         









 SODIUM            134 mmol/L            136-145        

 

 POTASSIUM            3.9 mmol/L            3.5-5.1        

 

 CHLORIDE            98 mmol/L                    

 

 TCO2            25.7 mmol/L            21.0-32.0        

 

 *ANION GAP            10.3 mmol/L            8.0-16.0        

 

 BUN            14             7-18        

 

 CREATININE            0.73             0.70-1.30        

 

 *BUN/CREATININE RATIO            19.2             9.1-17.0        

 

 GLUCOSE            114             65-99        

 

 CALCIUM            8.6             8.5-10.1        

 

 ALBUMIN            3.2             3.4-5.0        

 

 PHOSPHORUS            2.4             2.6-4.7        









 MAGNESIUM - 17 07:06         









 MAGNESIUM            1.8             1.8-2.4        









 ARTERIAL BLOOD GAS - 17 16:39         









 *ART. BASE EXCESS            2.4             -2.5-2.5        

 

 *ART. BICARBONATE            24.8 meq/L            19.0-29.0        

 

 ART. O2 SATURATION            92.5 %            91.0-97.0        

 

 *ART. PO2            63 mm[Hg]            80-95        

 

 *ART. TOTAL CO2            21.3             20.0-30.0        

 

 *ARTERIAL PC02            33.1 mm[Hg]            35.0-45.0        

 

 *ARTERIAL PH            7.487             7.350-7.450        









 PROCALCITONIN - 17 13:39         









 PROCALCITONIN            0.49 ng/mL            0.05-0.50        









 CBC WITH PLATELET NO DIFFERENTIAL - 17 06:07         









 MPV            9.1 fL            7.4-10.4        

 

 PLATELETS            142 10*3/uL            159-386        

 

 WBC            11.4 10*3/uL            3.6-11.2        

 

 RBC            4.77             4.06-5.63        

 

 HEMOGLOBIN            15.1             12.5-16.3        

 

 HEMATOCRIT            44.2 %            36.7-47.1        

 

 MCV            92.5 fL            80.0-100.0        

 

 MCH            31.6 pg            27.0-33.0        

 

 MCHC            34.1             32.0-36.0        

 

 RDW            13.8 %            12.3-17.0        

 

 RDWSD            44.2             37.1-47.8        









 BASIC METABOLIC PANEL - 17 06:07         









 SODIUM            138 mmol/L            136-145        

 

 POTASSIUM            4.3 mmol/L            3.5-5.1        

 

 CHLORIDE            101 mmol/L                    

 

 TCO2            28.2 mmol/L            21.0-32.0        

 

 *ANION GAP            8.8 mmol/L            8.0-16.0        

 

 BUN            21             7-18        

 

 CREATININE            0.83             0.70-1.30        

 

 *BUN/CREATININE RATIO            25.3             9.1-17.0        

 

 GLUCOSE            113             65-99        

 

 CALCIUM            9.1             8.5-10.1        









 MAGNESIUM - 17 06:07         









 MAGNESIUM            2.1             1.8-2.4        









 GFR ESTIMATION - 17 06:07         









 *GFR EST NON AFR AMERICAN            >90 mL/min            NRG        

 

 *GRFA EST AFR AMER            >90 mL/min            NRG        









 LEGIONELLA PNEUMO URINE AG - 17 13:10         









 LEGIONELLA PNEUMO URINE AG            Negative             Negative        









 CBC WITH PLATELET AND DIFFERENTIAL - 17 05:33         









 SEGS            85.5 %            NRG        

 

 *BASOPHILS            0.1 %            NRG        

 

 *EOSINOPHILS            0.1 %            NRG        

 

 AUTOMATED DIFF            PERFORMED             NRG        

 

 *LYMPHOCYTES            5.4 %            NRG        

 

 *MONOCYTES            8.9 %            NRG        

 

 *ABSOLUTE BASOPHILS            0.00 10*3/uL            0.00-0.20        

 

 *ABSOLUTE EOSINOPHILS            0.00 10*3/uL            0.00-0.50        

 

 *ABSOLUTE LYMPHOCYTES            0.30 10*3/uL            1.00-3.00        

 

 *ABSOLUTE MONOCYTES            0.50 10*3/uL            0.30-1.00        

 

 *ABSOLUTE NEUTROPHILS            5.10 10*3/uL            1.80-7.80        

 

 MPV            9.7 fL            7.4-10.4        

 

 PLATELETS            147 10*3/uL            159-386        

 

 WBC            5.9 10*3/uL            3.6-11.2        

 

 RBC            4.43             4.06-5.63        

 

 HEMOGLOBIN            13.9             12.5-16.3        

 

 HEMATOCRIT            41.0 %            36.7-47.1        

 

 MCV            92.5 fL            80.0-100.0        

 

 MCH            31.5 pg            27.0-33.0        

 

 MCHC            34.0             32.0-36.0        

 

 RDW            13.9 %            12.3-17.0        

 

 RDWSD            45.1             37.1-47.8        









 COMPREHENSIVE METABOLIC PANEL - 17 13:31         









 SODIUM            137 mmol/L            136-145        

 

 POTASSIUM            3.6 mmol/L            3.5-5.1        

 

 CHLORIDE            102 mmol/L                    

 

 TCO2            25.2 mmol/L            21.0-32.0        

 

 *ANION GAP            9.8 mmol/L            8.0-16.0        

 

 BUN            15             7-18        

 

 CREATININE            0.69             0.70-1.30        

 

 *BUN/CREATININE RATIO            21.7             9.1-17.0        

 

 GLUCOSE            132             65-99        

 

 CALCIUM            8.8             8.5-10.1        

 

 BILIFUBIN TOTAL            0.30             0.20-1.00        

 

 TOTAL PROTEIN            6.7             6.4-8.2        

 

 ALBUMIN            2.6             3.4-5.0        

 

 *GLOBULIN            4.1             2.3-3.5        

 

 *A/G RATIO            0.6             1.5-2.2        

 

 ALK PHOS            45 U/L                    

 

 ALT (SGPT)            48 U/L            14-59        

 

 AST (SGOT)            59 U/L            15-37        









 GFR ESTIMATION - 17 13:31         









 *GFR EST NON AFR AMERICAN            >90 mL/min            NRG        

 

 *GRFA EST AFR AMER            >90 mL/min            NRG        









 CBC WITH PLATELET NO DIFFERENTIAL - 17 13:32         









 MPV            8.7 fL            7.4-10.4        

 

 PLATELETS            287 10*3/uL            159-386        

 

 WBC            8.5 10*3/uL            3.6-11.2        

 

 RBC            4.62             4.06-5.63        

 

 HEMOGLOBIN            14.7             12.5-16.3        

 

 HEMATOCRIT            42.7 %            36.7-47.1        

 

 MCV            92.4 fL            80.0-100.0        

 

 MCH            31.7 pg            27.0-33.0        

 

 MCHC            34.3             32.0-36.0        

 

 RDW            14.0 %            12.3-17.0        

 

 RDWSD            45.5             37.1-47.8        



                                                                              



Encounters

      





 ACCT No.            Visit Date/Time            Discharge            Status    
        Pt. Type            Provider            Facility            Loc./Unit  
          Complaint        

 

 [id number]            2017 16:03:00            2017 23:59:00    
        DIS            Outpatient            Madai Keenan            [facility] 
[facility]            6 MO RCK        

 

 [id number]            2017 10:30:00            2017 23:59:00    
        DIS            Outpatient            GoMadai rubi            [facility] 
[facility]            3 MO NH RCK        

 

 [id number]            2016 10:24:00            2016 23:59:00    
        DIS            Outpatient            GoMadai rubi            [facility] 
[facility]            [facility]        

 

 [id number]            2016 10:07:00            2016 23:59:00    
        DIS            Outpatient            GoMadai rubi            [facility] 
[facility]            TCPA wound on left buttock 
history of MRSA        

 

 [id number]            2016 09:09:00            2016 23:59:00    
        DIS            Outpatient            GoMadai rubi            [facility] 
[facility]            60 day follow up from 4-19     
   

 

 [id number]            2016 14:45:00            2016 23:59:00    
        DIS            Outpatient            Madai Keenan            [facility] 
[facility]            sore in his croxsis        

 

 [id number]            2016 10:01:00            2016 23:59:00    
        DIS            Outpatient            David Lay            
[facility]            lower leg adema        

 

 [id number]            2016 10:42:00            2016 23:59:00    
        DIS            Outpatient            David Lay            
[facility]            2 week follow up post 
hospital        

 

 [id number]            2015 10:05:00            2015 23:59:00    
        DIS            Outpatient            GoMadai rubi            [facility] 
[facility]            SOV        

 

 [id number]            2015 10:01:00            2015 23:59:00    
        DIS            Outpatient            GoMadai rubi            [facility] 
[facility]            3MTH RCK [facility]        

 

 [id number]            2016 13:10:00                                   
   Document Registration                                                       
     

 

 [id number]            2017 08:37:00            2017 03:30:00     
       DIS            Outpatient            PREMMADAI LOIS                 
                     F25.9, F06.0,        

 

 [id number]            2017 12:04:00            2017 12:05:41     
       DIS            Outpatient            MADAI KEENAN                 
                     UA        

 

 [id number]            2016 15:24:00            2016 15:25:14     
       DIS            Outpatient            MADAI KEENAN                 
                     UA        

 

 [id number]            2016 09:55:00            2016 09:59:27     
       DIS            Outpatient            ESTEVAN KEENANALL LOIS                 
                              

 

 [id number]            10/10/2016 14:12:00            10/10/2016 23:59:59     
       CLS            Outpatient            CARSON HUNTER                     
                 Z79.899        

 

 [id number]            2016 00:01:00            2016 08:44:16     
       DIS            Outpatient            SHARLENE TREVIZO                  
                    [facility]        

 

 [id number]            2016 10:25:00            2016 10:44:36     
       DIS            Outpatient            MADAI KEENAN                 
                     <PV2.3.2>Localized edema</PV2.3.2><PV2.3.2>16 LE EDEMA
</PV2.3.2><PV2.3.2>Localized edema</PV2.3.2><PV2.3.2>Venous insufficiency (
chronic) (peripheral)</PV2.3.2>        

 

 [id number]            09/10/2016 08:30:00            2016 03:30:00     
       DIS            Outpatient            ABIGAIL ACEVES                         
             Z79.899        

 

 [id number]            2016 10:21:00            2016 00:14:54     
       DIS            Outpatient            SHARLENE TREVIZO                  
                    [facility]        

 

 [id number]            2017 19:45:00            2017 16:39:49     
       DIS            Outpatient            UNASSIGNED DOCTOR, DOCTOR          
                            CONFUSION/AGITATION        

 

 [id number]            2017 03:51:00            2017 18:13:23     
       DIS            Inpatient            MOOSE FERRARO                       
               <PV2.3.2>Human metapneumovirus pneumonia</PV2.3.2><PV2.3.2>PNA, 
SEPSIS, SCHIZOPHRENIA</PV2.3.2><PV2.3.2>Human metapneumovirus pneumonia</PV2.3.2
><PV2.3.2>Hyperosmolality and hypernatremia</PV2.3.2><PV2.3.2>Unspecified 
bacterial pneumonia</PV2.3.2><PV2.3.2>Parkinson's disease</PV2.3.2><PV2.3.2>
Dementia in oth diseases classd elswhr w/o behavrl disturb</PV2.3.2><PV2.3.2>
Hypertensive heart disease without heart failure</PV2.3.2><PV2.3.2>Schizophrenia
, unspecified</PV2.3.2><PV2.3.2>Anxiety disorder, unspecified</PV2.3.2><PV2.3.2>
Major depressive disorder, single episode, unspecified</PV2.3.2><PV2.3.2>
Conduct disorder, unspecified</PV2.3.2><PV2.3.2>Encounter for immunization</
PV2.3.2><PV2.3.2>Thrombocytopenia, unspecified</PV2.3.2>        

 

 [id number]            2017 14:28:00            2017 03:30:00     
       DIS            Outpatient            CARSON HUNTER                     
                 <PV2.3.2>G20, S81.809D, K59.00, F41.9, E56.9,</PV2.3.2><PV2.3.2
>G47.00, F32.9, I10, F25.9, F06.0</PV2.3.2>

## 2018-02-11 NOTE — XMS REPORT
Transition of Care/Referral Summary

 Created on: 2073



ROMULO HENDERSON

External Reference #: [id number]

: 1960

Sex: Male



Demographics







 Address  [address]

[place]  [address]

 

 Home Phone  [phone]

 

 Preferred Language  English

 

 Marital Status  Single

 

 Adventism Affiliation  Unspecified Baptism

 

 Race  White

 

 Ethnic Group  Not  or 





Author







 Author  [facility]

 

 Organization  [facility]

 

 Address  Unknown

 

 Phone  Unavailable







Care Team Providers







 Care Team Member Name  Role  Phone

 

 Jenniffer Ford  PCP  [phone]







Encounter





VC FIN [id number] Date(s): 6/30/15 - 6/30/15

[facility] [address] [place] [address]      [phone]

Discharge Diagnosis: Schizophrenia

Discharge Diagnosis: Hypertension

Discharge Diagnosis: Parkinson's disease

Discharge Disposition: 01-Home or Self Care

Attending Physician: Jani Keenan MD

Admitting Physician: Jani Keenan MD





Vital Signs







 



  Most recent to   1



  oldest [Reference 



  Range]: 

 

 



  Temperature Tympanic   37.5 degC



  [36.6-38.1 degC]   (6/30/15 10:16 AM)

 

 



  Peripheral Pulse   76 bpm



  Rate [ bpm]   (6/30/15 10:16 AM)

 

 



  Blood Pressure   118/84 mmHg



  [/60-90 mmHg]   (6/30/15 10:16 AM)







Problem List







    



  Condition   Effective Dates   Status   Health Status   Informant

 

    



  Anxiety(Confirmed)   < 14   Resolved  

 

    



  Anxiety state    Active  



  (finding)(Confirmed)    

 

    



  Hypertension(Confirm    Active  



  ed)    

 

    



  Parkinson's(Confirme   < 14   Resolved  



  d)    

 

    



  Parkinson's disease    Active  



  (disorder)(Confirmed    



  )    

 

    



  Schizophrenia    Active  



  (disorder)(Confirmed    



  )    

 

    



  Schizophrenia(Confir   < 14   Resolved  



  med)    







Allergies, Adverse Reactions, Alerts





No Known Medication Allergies



Medications





amantadine 100 mg oral capsule

1 caps, Oral, Daily, # 30 caps, 0 Refill(s)

Start Date: 3/30/15

Status: Ordered



Aricept 10 mg oral tablet

1 tabs, Oral, Bedtime (once a day), # 30 tabs, 0 Refill(s)

Start Date: 3/30/15

Status: Ordered



Comtan 200 mg oral tablet

See Instructions, Take 1 tablet (200 MG) by oral route 4 times every day in 
combination with carbidopa and levodopa, 0 Refill(s)

Start Date: 14

Status: Ordered



Depakote 250 mg oral delayed release tablet

250 mg 1 tabs, Oral, TID, 0 Refill(s)

Start Date: 9/28/15

Status: Ordered



Depakote 500 mg oral delayed release tablet

500 mg 1 tabs, Oral, BID, 0 Refill(s)

Start Date: 9/28/15

Status: Ordered



folic acid 1 mg oral tablet

1 mg 1 tabs, Oral, Daily, 0 Refill(s)

Start Date: 9/28/15

Status: Ordered



Mirapex 1 mg oral tablet

See Instructions, TAKE ONE TABLET BY MOUTH 2 TIMES PER DAY AT 0800 AND NOON, # 
56 tabs, 1 Refill(s), eRx: [facility], TAKE ONE TABLET BY MOUTH 2 
TIMES PER DAY AT 0800 AND NOON

Start Date: 14

Status: Ordered



Paxil 40 mg oral tablet

40 mg 1 tabs, Oral, Daily, 0 Refill(s)

Start Date: 9/28/15

Status: Ordered



SEROquel 200 mg oral tablet

200 mg 1 tabs, Oral, Daily, 0 Refill(s)

Start Date: 9/28/15

Status: Ordered



Sinemet 25 mg-100 mg oral tablet

2 tabs, Oral, QID, 0 Refill(s)

Start Date: 14

Status: Ordered



Sinemet CR 50 mg-200 mg oral tablet, extended release

See Instructions, Take 1 tablet by oral route at 10 am and 6 pm., 0 Refill(s)

Start Date: 14

Status: Ordered



temazepam 7.5 mg oral capsule

7.5 mg 1 caps, Oral, Bedtime (once a day), # 30 caps, 0 Refill(s)

Start Date: 12/15/15

Status: Ordered



Tiazac 120 mg/24 hours oral capsule, extended release

See Instructions, TAKE ONE CAPSULE BY MOUTH EVERY MORNING AT 0800, # 28 caps, 0 
Refill(s), Pharmacy: [facility], TAKE ONE CAPSULE BY MOUTH EVERY 
MORNING AT 0800

Start Date: 14

Status: Ordered



traMADol 50 mg oral tablet

50 mg 1 tabs, Oral, q6hr, [phone], # 120 tabs, 0 Refill(s)

Start Date: 16

Status: Ordered



trihexyphenidyl 2 mg oral tablet

1 tabs, Oral, BID, 0 Refill(s)

Start Date: 14

Status: Ordered



Results





No data available for this section



Immunizations







  



  Vaccine   Date   Refusal Reason

 

  



  tetanus/diphth/pertuss (Tdap) adult/adol   5/25/10 

 

  



  pneumococcal 23-polyvalent vaccine   5/25/10 







Procedures







   



  Procedure   Date   Related Diagnosis   Body Site

 

   



  Hernia repair     







Social History







 



  Social History Type   Response

 

 



  Smoking Status   Former smoker1







1d 



Assessment and Plan

Extracted from:





  



  Title: Office Visit Note   Author: Jani Keenan MD   Date: 6/30/15









  Assessment/Plan

 Hypertension

 Parkinson's disease

 Schizophrenia

 Overall he seems to be stable and we will mostly continue current medications.
 In the interest of reducing his pill burden, we changed both Colace and 
tramadol to when necessary dosing. Follow-up per nursing home requirements, 
or otherwise when necessary.



 We will send copies of the dictation to [facility], Dr. Hilliard, 
and Dr. Montoya.

## 2018-02-11 NOTE — XMS REPORT
Transition of Care/Referral Summary

 Created on: 2105



ROMULO HENDERSON

External Reference #: [id number]

: 1960

Sex: Male



Demographics







 Address  [address]

[place]  [address]

 

 Home Phone  [phone]

 

 Preferred Language  English

 

 Marital Status  Single

 

 Confucianist Affiliation  Unspecified Scientology

 

 Race  White

 

 Ethnic Group  Not  or 





Author







 Author  [facility]

 

 Organization  [facility]

 

 Address  Unknown

 

 Phone  Unavailable







Care Team Providers







 Care Team Member Name  Role  Phone

 

 Jani Keenan  PCP  [phone]







Encounter





VC FIN [id number] Date(s): 16 - 16

[facility], [address] [place] [address]      [phone]

Discharge Disposition: 01-Home or Self Care

Attending Physician: David Lay

Admitting Physician: David Lay





Vital Signs







 



  Most recent to   1



  oldest [Reference 



  Range]: 

 

 



  Peripheral Pulse   64 bpm



  Rate [ bpm]   (16 10:10 AM)

 

 



  Respiratory Rate   18 br/min



  [14-20 br/min]   (16 10:10 AM)

 

 



  Blood Pressure   120/80 mmHg



  [/60-90 mmHg]   (16 10:10 AM)

 

 



  SpO2   97 %



   (16 10:10 AM)







Problem List







    



  Condition   Effective Dates   Status   Health Status   Informant

 

    



  Anxiety state    Active  



  (finding)(Confirmed)    

 

    



  Anxiety(Confirmed)   < 14   Resolved  

 

    



  Hypertension(Confirm    Active  



  ed)    

 

    



  Parkinson's(Confirme   < 14   Resolved  



  d)    

 

    



  Parkinson's disease    Active  



  (disorder)(Confirmed    



  )    

 

    



  Schizophrenia    Active  



  (disorder)(Confirmed    



  )    

 

    



  Schizophrenia(Confir   < 14   Resolved  



  med)    







Allergies, Adverse Reactions, Alerts





No Known Medication Allergies



Medications





amantadine 100 mg oral capsule

1 caps, Oral, Daily, # 30 caps, 0 Refill(s)

Start Date: 3/30/15

Status: Ordered



Aricept 10 mg oral tablet

1 tabs, Oral, Bedtime (once a day), # 30 tabs, 0 Refill(s)

Start Date: 3/30/15

Status: Ordered



Ativan 1 mg oral tablet

1 mg 1 tabs, Oral, q6hr, as needed for anxiety, 0 Refill(s)

Start Date: 16

Status: Ordered



Colace 100 mg oral capsule

100 mg 1 caps, Oral, BID, as needed for constipation, # 20 caps, 0 Refill(s)

Start Date: 16

Status: Ordered



Comtan 200 mg oral tablet

See Instructions, Take 1 tablet (200 MG) by oral route 4 times every day in 
combination with carbidopa and levodopa, 0 Refill(s)

Start Date: 14

Status: Ordered



Depakote 500 mg oral delayed release tablet

500 mg 1 tabs, Oral, BID, 0 Refill(s)

Start Date: 9/28/15

Status: Ordered



folic acid 1 mg oral tablet

1 mg 1 tabs, Oral, Daily, 0 Refill(s)

Start Date: 9/28/15

Status: Ordered



Milk of Magnesia

30 mL, Oral, Bedtime (once a day), 0 Refill(s)

Start Date: 16

Status: Ordered



MiraLax oral powder for reconstitution

17 g, Oral, Daily, dissolve in water before taking, # 255 g, 0 Refill(s)

Start Date: 16

Status: Ordered



Paxil 40 mg oral tablet

40 mg 1 tabs, Oral, Daily, 0 Refill(s)

Start Date: 9/28/15

Status: Ordered



QUEtiapine 100 mg oral tablet

100 mg 1 tabs, Oral, Daily, 0 Refill(s)

Start Date: 16

Status: Ordered



Restoril 15 mg oral capsule

15 mg 1 caps, Oral, Bedtime (once a day), as needed for sleep, 0 Refill(s)

Start Date: 16

Status: Ordered



SEROquel 300 mg oral tablet

300 mg 1 tabs, Oral, Daily, 0 Refill(s)

Start Date: 16

Status: Ordered



Sinemet 25 mg-100 mg oral tablet

2 tabs, Oral, QID, 0 Refill(s)

Start Date: 14

Status: Ordered



Sinemet CR 50 mg-200 mg oral tablet, extended release

See Instructions, Take 1 tablet by oral route at 10 am and 6 pm., 0 Refill(s)

Start Date: 14

Status: Ordered



Tiazac 120 mg/24 hours oral capsule, extended release

See Instructions, TAKE ONE CAPSULE BY MOUTH EVERY MORNING AT 0800, # 28 caps, 0 
Refill(s), Pharmacy: [facility], TAKE ONE CAPSULE BY MOUTH EVERY 
MORNING AT 0800

Start Date: 14

Status: Ordered



traMADol 50 mg oral tablet

50 mg 1 tabs, Oral, [facility]hr[phone], # 120 tabs, 0 Refill(s)

Start Date: 16

Status: Ordered



trihexyphenidyl 2 mg oral tablet

1 tabs, Oral, BID, 0 Refill(s)

Start Date: 14

Status: Ordered



Results





No data available for this section



Immunizations







  



  Vaccine   Date   Refusal Reason

 

  



  tetanus/diphth/pertuss (Tdap) adult/adol   5/25/10 

 

  



  pneumococcal 23-polyvalent vaccine   5/25/10 







Procedures







   



  Procedure   Date   Related Diagnosis   Body Site

 

   



  Hernia repair     







Social History







 



  Social History Type   Response

 

 



  Smoking Status   Former smoker1







1dc 



Assessment and Plan





No data available for this section

## 2018-02-11 NOTE — XMS REPORT
[facility].

 Created on: 2016



Bob Jose

External Reference #: [id number]

: 1960

Sex: Male



Demographics







 Address  [address] [place]  [address]

 

 Home Phone  [phone]

 

 Preferred Language  Unknown

 

 Marital Status  Unknown

 

 Druze Affiliation  Unknown

 

 Race  White

 

 Ethnic Group  Not  or 





Author







 Taty Bartholomew

 

 [facility]  [employer]

 

 Address  Unknown

 

 Phone  Unavailable







Care Team Providers







 Care Team Member Name  Role  Phone

 

 Taty New  CP  Unavailable



                                                                



Allergies

          No Known Allergies                                                   
                                     



Problems

          





 Problem Type  Condition  Code  Onset Dates  Condition Status

 

 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

 

 Problem  Parkinson's disease  G20     Active



                                                                               
                   



Medications

          No Known Medications                                                 
                             



Results

          No Known Results                                                     
               



Summary Purpose

          eClinicalWorks Submission

## 2018-02-11 NOTE — XMS REPORT
Summary of Care

 Created on: 2017



LORELEI Jose

External Reference #: [id number]

: 1960

Sex: Male



Demographics







 Address  [address] [place]  [address]

 

 Preferred Language  English

 

 Marital Status  Unknown

 

 Rastafarian Affiliation  Unknown

 

 Race  Other Race

 

 Ethnic Group  Unknown





Author







 Author  Dionisio Sinha M.D.

 

 Organization  Unknown

 

 Address  [address] [place] [address] [place]  [address]



 

 Phone  Unavailable







Care Team Providers







 Care Team Member Name  Role  Phone

 

 Dionisio Sinha M.D.  Unavailable  Unavailable

 

 Jani Keenan  Unavailable  Unavailable







Functional Status







 Name  Dates  Details

 

 Functional status health issues are not documented

     Status: 









 Name  Dates  Details

 

 Cognitive status health issues are not documented

     Status: 







Problems







 Name  Dates  Details

 

 Anxiety (300.00, F41.9) 

     Status: Active

 

 Chronic venous stasis dermatitis (454.1, I83.10) 

     Status: Active

 

 Hypertension (401.9, I10) 

     Status: Active

 

 Schizophrenia (295.90, F20.9) 

     Status: Active

 

 Chronic constipation (564.00, K59.09) 

     Status: Active

 

 Vitamin deficiency (269.2, E56.9) 

     Status: Active

 

 Suprapubic pain (789.09, R10.2) 

     Status: Active

 

 Increased urinary frequency (788.41, R35.0) 

     Status: Active

 

 Nocturia (788.43, R35.1) 

     Status: Active

 

 Parkinson disease, symptomatic (332.0, G20) 

     Status: Active

 

 Incomplete bladder emptying (788.21, R33.9) 

     Status: Active







Medications







 Name  Dates  Details









 Aricept 10 MG Oral Tablet

TAKE 1 TABLET DAILY AS DIRECTED.



 











Bharath MOSELEY.ERWIN.Dionisio * 





  Start 2017





Active

Ativan 1 MG Oral Tablet

TAKE 1 TABLET EVERY 6 TO 8 HOURS AS NEEDED NAUSEA.

* 





  Refills: 0









Cho M.D., Dionisio * 





  Start 2017





Active

Colace 100 MG Oral Capsule

TAKE 1 CAPSULE TWICE DAILY AS NEEDED.

* 





  Refills: 0









Cho M.D., Dionisio * 





  Start 2017





Active

Comtan 200 MG Oral Tablet

TAKE 1 TABLET 3 TIMES DAILY.

* 





  Refills: 0









Cho M.D., Dionisio * 





  Start 2017





Active

Divalproex Sodium 500 MG Oral Tablet Delayed Release

TAKE 1 TABLET 3 TIMES DAILY AFTER MEALS.

* 





  Refills: 0









Cho M.D., Dionisio * 





  Start 2017





Active

Folic Acid 1 MG Oral Tablet

ONE TABLET BY MOUTH DAILY

* 





  Quantity: 90   Refills: 1









Cho M.D., Dionisio * 





  Start 2017





Active

MiraLax Oral Powder

MIX 1 CAPFUL (17GM) IN 8 OUNCES OF WATER, JUICE, OR TEA AND DRINK DAILY.

* 





  Quantity: 527   Refills: 11









Cho M.D., Dionisio * 





  Start 2017





Active

PARoxetine HCl - 40 MG Oral Tablet

TAKE 1 TABLET DAILY AS DIRECTED.

* 





  Refills: 0









Cho M.D., Dionisio * 





  Start 2017





Active

Restoril 15 MG Oral Capsule

TAKE 1 CAPSULE AT BEDTIME AS NEEDED FOR SLEEP.

* 





  Refills: 0









Cho M.D., Dionisio * 





  Start 2017





Active

Sinemet  MG Oral Tablet

Take 1 tablet daily

* 





  Refills: 0









Cho M.D., Dionisio * 





  Start 2017





Active

Sinemet CR  MG Oral Tablet Extended Release

TAKE 1 TABLET 3 TIMES DAILY.

* 





  Refills: 0









Cho M.D., Dionisio * 





  Start 2017





Active

Tiazac 120 MG Oral Capsule Extended Release 24 Hour

TAKE 1 CAPSULE EVERY MORNING DAILY.

* 





  Refills: 0









Cho M.D., Dionisio * 





  Start 2017





Active

TraMADol HCl - 50 MG Oral Tablet

TAKE 1 TABLET EVERY 6 HOURS AS NEEDED FOR PAIN.

* 





  Refills: 0









Cho M.D., Dionisio * 





  Start 2017





Active

DiltiaZEM  MG Oral Capsule Extended Release 24 Hour

TAKE 1 CAPSULE ONCE DAILY.

* 





  Refills: 0









Cho M.D., Dionisio * 





  Start 2017





Active

Tylenol 325 MG Oral Tablet

TAKE 1 TO 2 TABLETS EVERY 4 HOURS AS NEEDED

* 





  Refills: 0









Cho M.D., Dionisio * 





  Start 2017





Active





Allergies and Adverse Reactions







 Name  Dates  Details

 

 No Known Drug Allergies (Allergy)     Status: Active









Procedures







 Procedure  Dates  Details

 

 History of Hernia Repair      

 

 CT AB/ PEL WITHOUT IV CONTRAST (FOR KIDNEY STONE)  Ordered: 2017    







Immunization







 Name  Dates  Details

 

 Immunizations not documented

      







Family History







 Name  Dates  Details

 

 Family history of cerebrovascular accident (CVA) (V17.1, Z82.3) 

     Status: Active









 Name  Dates  Details

 

 Family history of cardiac disorder (V17.49, Z82.49) 

     Status: Active

 

 Family history of Coronary artery disease (414.00, I25.10) 

     Status: Active









 Name  Dates  Details

 

 Family history of hypertension (V17.49, Z82.49) 

     Status: Active

 

 Family history of malignant neoplasm of breast (V16.3, Z80.3) 

     Status: Active

 

 Family history of diabetes mellitus (V18.0, Z83.3) 

     Status: Active









 Name  Dates  Details

 

 Family history of hypertension (V17.49, Z82.49) 

     Status: Active

 

 Family history of depression (V17.0, Z81.8) 

     Status: Active

 

 Family history of malignant neoplasm of prostate (V16.42, Z80.42) 

     Status: Active









 Name  Dates  Details

 

 Family history of malignant neoplasm of uterus (V16.49, Z80.49) 

     Status: Active







Social History







 Name  Dates  Details

 

 -

     Status: 









 Name  Dates  Details

 

 Former smoker      







Vital Signs







 Date  Test  Result  Details

 

           

 

 2017 13:10

  BP Systolic  116 mm[Hg]  Status: Comments: Location:  ; Position:  











 BP Diastolic  60 mm[Hg]  Status: Comments: Location:  ; Position:  



 

 Heart Rate  72 /min  Status: Comments: Location:  ; 



 

 Weight  196 lb  Status: 









Results







 Date  Description  Value  Details

 

   Results not documented      

 

           







Plan of Care







 Name  Dates  Details

 

 Planned Observations 

 

 Planned Goals not documented      







Instructions







 Name  Dates  Details

 

 Instructions not documented

      







Encounters







 Appointment; Dionisio Sinha M.D. 

Encounter Diagnosis: Problem not documented

  On 2017

 13:15

## 2018-02-11 NOTE — XMS REPORT
[facility], [facility].

 Created on: 10/03/2016



Bob Jose

External Reference #: [id number]

: 1960

Sex: Male



Demographics







 Address  [address] [place]  [address]

 

 Home Phone  [phone]

 

 Preferred Language  Unknown

 

 Marital Status  Unknown

 

 Episcopalian Affiliation  Unknown

 

 Race  White

 

 Ethnic Group  Not  or 





Author







 Taty Bartholomew

 

 [facility]  eClinicalWorks

 

 Address  Unknown

 

 Phone  Unavailable







Care Team Providers







 Care Team Member Name  Role  Phone

 

 Taty New  CP  Unavailable



                                                                



Allergies

          No Known Allergies                                                   
                                     



Problems

          





 Problem Type  Condition  Code  Onset Dates  Condition Status

 

 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

 

 Problem  Parkinson's disease  G20     Active



                                                                               
                   



Medications

          No Known Medications                                                 
                             



Results

          No Known Results                                                     
               



Summary Purpose

          eClinicalWorks Submission

## 2018-02-11 NOTE — XMS REPORT
Summary of Care

 Created on: 2017



LORELEI Jose

External Reference #: [id number]

: 1960

Sex: Male



Demographics







 Address  [address] [place]  [address]

 

 Preferred Language  English

 

 Marital Status  Unknown

 

 Orthodox Affiliation  Unknown

 

 Race  Other Race

 

 Ethnic Group  Unknown





Author







 Author  Dionisio Sinha M.D.

 

 Organization  Unknown

 

 Address  [address] [place] [address] [address] [place]  [address]



 

 Phone  Unavailable







Care Team Providers







 Care Team Member Name  Role  Phone

 

 Dionisio Sinha M.D.  Unavailable  Unavailable

 

 Jani Keenan  Unavailable  Unavailable







Functional Status







 Name  Dates  Details

 

 Functional status health issues are not documented

     Status: 









 Name  Dates  Details

 

 Cognitive status health issues are not documented

     Status: 







Problems







 Name  Dates  Details

 

 Anxiety (300.00, F41.9) 

     Status: Active

 

 Chronic venous stasis dermatitis (454.1, I83.10) 

     Status: Active

 

 Hypertension (401.9, I10) 

     Status: Active

 

 Schizophrenia (295.90, F20.9) 

     Status: Active

 

 Chronic constipation (564.00, K59.09) 

     Status: Active

 

 Vitamin deficiency (269.2, E56.9) 

     Status: Active

 

 Nocturia (788.43, R35.1) 

     Status: Active

 

 Parkinson disease, symptomatic (332.0, G20) 

     Status: Active

 

 Suprapubic pain (789.09, R10.2) 

     Status: Active

 

 Incomplete bladder emptying (788.21, R33.9) 

     Status: Active

 

 Increased urinary frequency (788.41, R35.0) 

     Status: Active







Medications







 Name  Dates  Details









 Aricept 10 MG Oral Tablet

TAKE 1 TABLET DAILY AS DIRECTED.



 











Bharath MOSELEY.ERWIN.Dionisio * 





  Start 2017





Active

Ativan 1 MG Oral Tablet

TAKE 1 TABLET EVERY 6 TO 8 HOURS AS NEEDED NAUSEA.

* 





  Refills: 0









Cho M.D., Dionisio * 





  Start 2017





Active

Colace 100 MG Oral Capsule

TAKE 1 CAPSULE TWICE DAILY AS NEEDED.

* 





  Refills: 0









Cho M.D., Dionisio * 





  Start 2017





Active

Comtan 200 MG Oral Tablet

TAKE 1 TABLET 3 TIMES DAILY.

* 





  Refills: 0









Cho M.D., Dionisio * 





  Start 2017





Active

Divalproex Sodium 500 MG Oral Tablet Delayed Release

TAKE 1 TABLET 3 TIMES DAILY AFTER MEALS.

* 





  Refills: 0









Cho M.D., Dionisio * 





  Start 2017





Active

Folic Acid 1 MG Oral Tablet

ONE TABLET BY MOUTH DAILY

* 





  Quantity: 90   Refills: 1









Cho M.D., Dionisio * 





  Start 2017





Active

MiraLax Oral Powder

MIX 1 CAPFUL (17GM) IN 8 OUNCES OF WATER, JUICE, OR TEA AND DRINK DAILY.

* 





  Quantity: 527   Refills: 11









Cho M.D., Dionisio * 





  Start 2017





Active

PARoxetine HCl - 40 MG Oral Tablet

TAKE 1 TABLET DAILY AS DIRECTED.

* 





  Refills: 0









Cho M.D., Dionisio * 





  Start 2017





Active

Restoril 15 MG Oral Capsule

TAKE 1 CAPSULE AT BEDTIME AS NEEDED FOR SLEEP.

* 





  Refills: 0









Cho M.D., Dionisio * 





  Start 2017





Active

Sinemet  MG Oral Tablet

Take 1 tablet daily

* 





  Refills: 0









Cho M.D., Dionisio * 





  Start 2017





Active

Sinemet CR  MG Oral Tablet Extended Release

TAKE 1 TABLET 3 TIMES DAILY.

* 





  Refills: 0









Cho M.D., Dionisio * 





  Start 2017





Active

Tiazac 120 MG Oral Capsule Extended Release 24 Hour

TAKE 1 CAPSULE EVERY MORNING DAILY.

* 





  Refills: 0









Cho M.D., Dionisio * 





  Start 2017





Active

TraMADol HCl - 50 MG Oral Tablet

TAKE 1 TABLET EVERY 6 HOURS AS NEEDED FOR PAIN.

* 





  Refills: 0









Cho M.D., Dionisio * 





  Start 2017





Active

DiltiaZEM  MG Oral Capsule Extended Release 24 Hour

TAKE 1 CAPSULE ONCE DAILY.

* 





  Refills: 0









Cho M.D., Dionisio * 





  Start 2017





Active

Tylenol 325 MG Oral Tablet

TAKE 1 TO 2 TABLETS EVERY 4 HOURS AS NEEDED

* 





  Refills: 0









Cho M.D., Dionisio * 





  Start 2017





Active





Allergies and Adverse Reactions







 Name  Dates  Details

 

 No Known Drug Allergies (Allergy)     Status: Active









Procedures







 Procedure  Dates  Details

 

 History of Hernia Repair      

 

 Procedures not documented      







Immunization







 Name  Dates  Details

 

 Immunizations not documented

      







Family History







 Name  Dates  Details

 

 Family history of cerebrovascular accident (CVA) (V17.1, Z82.3) 

     Status: Active









 Name  Dates  Details

 

 Family history of cardiac disorder (V17.49, Z82.49) 

     Status: Active

 

 Family history of Coronary artery disease (414.00, I25.10) 

     Status: Active









 Name  Dates  Details

 

 Family history of hypertension (V17.49, Z82.49) 

     Status: Active

 

 Family history of malignant neoplasm of breast (V16.3, Z80.3) 

     Status: Active

 

 Family history of diabetes mellitus (V18.0, Z83.3) 

     Status: Active









 Name  Dates  Details

 

 Family history of hypertension (V17.49, Z82.49) 

     Status: Active

 

 Family history of depression (V17.0, Z81.8) 

     Status: Active

 

 Family history of malignant neoplasm of prostate (V16.42, Z80.42) 

     Status: Active









 Name  Dates  Details

 

 Family history of malignant neoplasm of uterus (V16.49, Z80.49) 

     Status: Active







Social History







 Name  Dates  Details

 

 -

     Status: 









 Name  Dates  Details

 

 Former smoker      







Vital Signs







 Date  Test  Result  Details

 

           

 

   No Known Vitals to report      







Results







 Date  Description  Value  Details

 

 6-Mar-2017 16:40  CT AB/ PEL WITHOUT IV CONTRAST (FOR KIDNEY STONE)   Comments
: Exam Date: 2017 14:27Dictation Date: 2017 16:40



 

    XC ABD PEL W/O (RENAL STONE)     

 

 20-Mar-2017 17:09  CYTOLOGY - URINE [id number]   

 

    CYTOLOGY  Specimen referred to [facility]. Report to follow.    







Plan of Care







 Name  Dates  Details

 

 Planned Observations 

 

 Planned Goals not documented      







Instructions







 Name  Dates  Details

 

 Instructions not documented

      







Encounters







 Appointment; Dionisio Sinha M.D. 

Encounter Diagnosis: Problem not documented

  On 20-Mar-2017

 14:30

 

 Appointment; Dionisio Sinha M.D. 

Encounter Diagnosis: Problem not documented

  On 2017

 13:15

 

 Appointment; Dionisio Sinha M.D. 

Encounter Diagnosis: Problem not documented

  On 2017

 13:15

## 2018-02-12 VITALS — DIASTOLIC BLOOD PRESSURE: 84 MMHG | SYSTOLIC BLOOD PRESSURE: 148 MMHG

## 2018-02-12 VITALS — DIASTOLIC BLOOD PRESSURE: 89 MMHG | SYSTOLIC BLOOD PRESSURE: 155 MMHG

## 2018-02-12 VITALS — SYSTOLIC BLOOD PRESSURE: 159 MMHG | DIASTOLIC BLOOD PRESSURE: 85 MMHG

## 2018-02-12 VITALS — SYSTOLIC BLOOD PRESSURE: 160 MMHG | DIASTOLIC BLOOD PRESSURE: 75 MMHG

## 2018-02-12 RX ADMIN — SODIUM CHLORIDE SCH MLS/HR: 900 INJECTION, SOLUTION INTRAVENOUS at 05:41

## 2018-02-12 RX ADMIN — SODIUM CHLORIDE SCH MLS/HR: 900 INJECTION, SOLUTION INTRAVENOUS at 00:34

## 2018-02-12 RX ADMIN — MUPIROCIN SCH APPLIC: 20 OINTMENT TOPICAL at 09:02

## 2018-02-12 RX ADMIN — RISPERIDONE SCH MG: 1 TABLET, FILM COATED ORAL at 20:35

## 2018-02-12 RX ADMIN — OXYCODONE HYDROCHLORIDE AND ACETAMINOPHEN PRN TAB: 5; 325 TABLET ORAL at 00:52

## 2018-02-12 RX ADMIN — CARBIDOPA AND LEVODOPA SCH EA: 25; 100 TABLET ORAL at 11:22

## 2018-02-12 RX ADMIN — FAMOTIDINE SCH MG: 10 INJECTION INTRAVENOUS at 09:02

## 2018-02-12 RX ADMIN — DIVALPROEX SODIUM SCH MG: 500 TABLET, DELAYED RELEASE ORAL at 20:35

## 2018-02-12 RX ADMIN — MUPIROCIN SCH APPLIC: 20 OINTMENT TOPICAL at 20:36

## 2018-02-12 RX ADMIN — CARBIDOPA AND LEVODOPA SCH EA: 25; 100 TABLET ORAL at 05:41

## 2018-02-12 RX ADMIN — CARBIDOPA AND LEVODOPA SCH EA: 25; 100 TABLET ORAL at 16:34

## 2018-02-12 RX ADMIN — OXYCODONE HYDROCHLORIDE AND ACETAMINOPHEN PRN TAB: 5; 325 TABLET ORAL at 20:36

## 2018-02-12 RX ADMIN — OXYCODONE HYDROCHLORIDE AND ACETAMINOPHEN PRN TAB: 5; 325 TABLET ORAL at 16:37

## 2018-02-12 RX ADMIN — RISPERIDONE SCH MG: 1 TABLET, FILM COATED ORAL at 09:03

## 2018-02-12 RX ADMIN — DIVALPROEX SODIUM SCH MG: 500 TABLET, DELAYED RELEASE ORAL at 09:08

## 2018-02-12 RX ADMIN — RIVAROXABAN SCH MG: 10 TABLET, FILM COATED ORAL at 09:03

## 2018-02-12 RX ADMIN — SODIUM CHLORIDE SCH MLS/HR: 900 INJECTION, SOLUTION INTRAVENOUS at 18:30

## 2018-02-12 RX ADMIN — SODIUM CHLORIDE SCH MLS/HR: 900 INJECTION, SOLUTION INTRAVENOUS at 12:19

## 2018-02-12 NOTE — OPERATIVE REPORT
DATE OF SERVICE:  02/10/2018



PREOPERATIVE DIAGNOSIS:

Displaced subcapital fracture of left femoral neck.



POSTOPERATIVE DIAGNOSIS:

Displaced subcapital fracture of left femoral neck.



PROCEDURE PERFORMED:

Uncemented bipolar hemiarthroplasty, left hip.



IMPLANTS USED:

1.  The DePuy Synthes uncemented femoral stem, a size #9 with standard offset.

2.  The DePuy Synthes bipolar femoral head, size 53 mm.

3.  The DePuy Synthes ARTICUL/KAEL femoral head, size 28 mm with a standard

offset.



ATTENDING SURGEON:

Dr. Neville Gilmore.



FIRST ASSISTANT:

Roger Shaver PA-C; Mr. Shaver's assistance was required secondary to the complexity

of the case, in order to hold retractors for vital neurovascular structures, and

to increase the efficiency and efficacy of the case.



ANESTHESIA:

General.



ESTIMATED BLOOD LOSS:

250 mL.



COMPLICATIONS:

None.



SPECIMENS:

None.



DRAINS:

None.



BRIEF HISTORY AND INDICATIONS:

The patient is a 57-year-old male who has been residing in a local skilled

nursing facility secondary to mental disability, who apparently sustained a

mechanical ground level fall landing on to his left hip.  He began complaining

of severe left hip pain and inability to ambulate or bear weight on his left

lower extremity.  He was taken to a local urgent care center where plain

radiographs of the patient's left hip demonstrated a left femoral neck fracture.

 As such, he was transferred to [facility] for definitive management

of his injury.  Upon presentation, the patient complained of only left hip pain,

there was no evidence of any additional musculoskeletal injuries.  Orthopedic

Services were consulted for definitive management of his injury after the

patient was admitted to the hospital under the hospitalist medicine service.  On

exam preoperatively, the patient had difficulty participating in an exam

secondary to his mental disability.  His left lower extremity was shortened and

externally rotated.  Skin was intact, there were no open wounds.  There was

significant pain with any attempt at range of motion of the left hip, motor and

sensory function was grossly intact throughout and all compartments were soft. 

I did review the images of the left hip preoperatively, which demonstrated a

completely displaced subcapital fracture of the left femoral neck.  I did not

believe the patient was a good candidate for primary fixation secondary to the

complete displacement and chronicity of the injury which apparently had occurred

at least 3 days prior to his presentation to the hospital.  Also, I did not

believe the patient was an appropriate candidate for total hip arthroplasty

secondary to his mental disability.  As such, I recommended bipolar

hemiarthroplasty of the left hip.  I discussed the operative plan in detail with

the patient's power of  which included the risks, benefits, potential

complications and expected outcomes.  All questions were answered to the

satisfaction of the POA and informed written consent was obtained to proceed as

planned with the procedure.



PROCEDURE NOTE:

After correctly identifying the patient up in the preoperative holding area and

after his left hip was appropriately marked, he was transferred to the operating

room.  Once in the operating room, he had successful induction of general

anesthesia, the knee was transferred to a standard OR table and placed in the

lateral recumbent position with left hip up and right hip down.  Soft axillary

roll was placed on the right-sided chest wall.  All bony prominences were

meticulously padded.  The left leg was then prepped and draped in the routine

sterile fashion.  Prior to beginning the case, we completed an operating room

timeout with all parties involved in the case in agreement and verified

appropriate infusion of prophylactic antibiotics.



Using a 10-blade scalpel, I made an incision centered over the greater

trochanter of the left hip incising through the skin and subcutaneous tissue. 

Bovie cautery was then used to dissect through the fascia of the IT band and the

gluteus shelby to gain access to the gluteus medius.  I then dissected through

the anterior one-third of the gluteus medius bluntly to expose the fascia of the

gluteus minimus.  Fascia of the gluteus minimus was then dissected using Bovie

cautery and this dissection was extended distally releasing the abductors off

the anterolateral aspect of the proximal femur.  A full-thickness myofascial and

capsular flap was made in the dissection and tagged with #1 Vicryl stitch.  The

leg was then externally rotated to deliver the subcapital femoral neck fracture

into the wound.  Standard femoral neck osteotomy was then completed with

oscillating bone saw approximately 10 mm proximal to the lesser trochanter.  The

fractured femoral head was then removed from the acetabulum and measured to be

approximately 53 mm.  A bipolar femoral head trial was placed in the acetabulum,

size 53 mm and this fit perfectly, so we decided to go with that for the final

bipolar head size.  Inspection of the acetabular cartilage revealed it to be

stable with no significant articular surface damage.  I then began to prepare

the proximal femur for the uncemented femoral stem.  This was first accomplished

by using box osteotome to lateralize the proximal femur followed by sequential

broaches preparing the femoral canal until we were getting very good fit and

fill with a size #9 broach.  As such, we decided to go with this as the final

prosthesis size.  The final uncemented femoral stem was then inserted into the

femoral canal with light taps of the mallet to the appropriate position and

depth.  We then trialled with a size 53 bipolar head with standard offset and

this recreated the leg lengths quite well and provided very good stability. 

Therefore, we decided to go with these femoral heads for the final implants. 

The final bipolar implant was inserted on to the trunnion of the femoral stem

and Whiting taper mechanism was secured with light taps of the mallet.  The

prosthesis was then reduced into the acetabulum without complications and then

final range of motion testing confirmed very good stability of the prosthesis

and the leg lengths had been recreated adequately.  The wound was then irrigated

with copious amounts of sterile saline followed by closure.  Closure was

completed by using an Ethibond stitch for the anterolateral capsulotomy.  The

abductors were repaired back to the proximal femur with Ethibond stitch through

bone tunnels in the greater trochanter.  Fascia of the gluteus shelby and IT

band was repaired with #1 Vicryl, 2-0 Vicryl for the subcutaneous tissue and

staples for the skin.  The patient has sterile dressing applied and then was

awakened from general anesthesia without complications.  He was then transferred

to the PACU in stable condition.  All counts were correct at the end of the case

and the patient tolerated the procedure quite well.





Job ID: [id number]

DocumentID: [id number]

Dictated Date:  02/11/2018 17:19:40

Transcription Date: 02/12/2018 00:58:32

Dictated By: NEVILLE GILMORE

## 2018-02-12 NOTE — PHYSICAL THERAPY DAILY NOTE
PT Daily Note-Current


Subjective


No verbalization.  Eyes closed throughout most of treatment.





Mental Status


Patient Orientation:  Unable to Assess


Attachments:  IV





Transfers


              Functional [place] Measure


0=Not Assessed/NA   4=Minimal Assistance


1=Total Assistance   5=Supervision or Setup


2=Maximal Assistance   6=Modified [place]


3=Moderate Assistance   7=Complete IndependenceIRFPAI Quality Coding Scale











6 Independent with activity with or without an assistive device


 


5  Patient requires set up or clean up by helper.  Patient completes activity  

by  themselves


 


4 Supervision or touching assist (CGA). [place] provide cues , steadying assist


 


3 The helper provides less than half the effort to complete the activity


 


2 The helper provides more than half the effort to complete the activity


 


1 Dependent.  The helper does all the effort to complete an activity 


 


7 Patient refused to complete or attempt activity


 


9 The patient did not perform the activity before the current illness or injury


 


88 Not attempted due to Medical conditions or safety concerns











Weight Bearing


Right Lower Extremity:  Right


Full Weight Bearing


Left Lower Extremity:  Left


Weight Bearing/Tolerated





Treatments


Pt incont of urine.  Changed depend, cleaned pt.  Pt was dep for rolling and 

scooting in bed.  Dep to transfer to sit EOB and dep to maintain seated 

balance.  Used the sit to stand lift to transfer to the chair.  Pt did initiate 

holding onto the left and was able to stand to participate in the transfer.  Pt 

up in chair with pillow to support UE, chair alarm, reclined and feet elevated.

  Nurse and nurse tech notified that he is up in chair.





Assessment


Limited ability to actively participate with skilled PT intervnetion; however 

he does benefit from PT to assist with transfers to allow position changes, OOB 

activity with hopes that his awareness will improve and his ability to 

partciipate will increase.





PT Long Term Goals


Long Term Goals


PT Long Term Goals Time Frame:  Feb 16, 2018


Transfers (B,C,W/C) (FIM):  3





PT Plan


Problem List


Problem List:  Activity Tolerance, Functional Strength, Safety





Treatment/Plan


Treatment Plan:  Continue Plan of Care


Treatment Plan:  Bed Mobility, Education, Functional Activity Uriel, Functional 

Strength, Gait, Safety, Therapeutic Exercise, Transfers


Treatment Duration:  Feb 16, 2018


Frequency:  6 times per week


Estimated Hrs Per Day:  .5 hour per day


Patient and/or Family Agrees t:  Yes





Safety Risks/Education


Talked pt through the steps of the transfer and educated on what our plan is 

but do not think he understands.





Time/GCodes


Time In:  940


Time Out:  1005


Total Billed Treatment Time:  25


Total Billed Treatment


visit


FA 25











AYLA FRANCISCO PT Feb 12, 2018 10:29

## 2018-02-12 NOTE — PROGRESS NOTE (SOAP)
Subjective


Time Seen by Provider:  08:05


Subjective/Events-last exam


Patient awake today.


Patient unable to give any history.


Displaced left femoral hip fracture.


Dementia.


Schizophrenia.


MRSA positive.


Patient stable





Objective


Exam





Vital Signs








  Date Time  Temp Pulse Resp B/P (MAP) Pulse Ox O2 Delivery O2 Flow Rate FiO2


 


18 00:00 99.6 94 18 148/84 (105) 93 Room Air  


 


18 21:00      Room Air  


 


18 16:00 99.6 102 17 137/77 (97) 96 Room Air  


 


18 12:00 99.4 113 16 155/80 (105) 95 Room Air  


 


18 09:00      Room Air  














I & O 


 


 18





 07:00


 


Intake Total 2785 ml


 


Output Total 725 ml


 


Balance 2060 ml





Capillary Refill : Less Than 3 SecondsLess Than 3 Seconds


General Appearance:  No Apparent Distress, WD/WN


HEENT:  Normal ENT Inspection


Neck:  Full Range of Motion, Normal Inspection


Cardiovascular:  Regular Rate, Rhythm, No Murmur


Gastrointestinal:  non tender, soft





Results


Lab





Microbiology


18 Blood Culture - Preliminary, Resulted


         No growth


18 MRSA Screen - Final, Complete





Assessment/Plan


Assessment/Plan


Assess & Plan/Chief Complaint


Left hip fracture.


Dementia.


Schizophrenia.


Unable to get any history from patient.


Patient stable





Clinical Quality Measures


DVT/VTE Risk/Contraindication:


Risk Factor Score Per Nursin


RFS Level Per Nursing on Admit:  4+=Very High











CORNELIUS SHANE DO 2018 08:08

## 2018-02-12 NOTE — OCC THERAPY PROGRESS NOTE
Therapy Progress Note


Pt seen in room this afternoon. He is able to mumble his name but not follow 

directions foe simple task of washing face. he could get a drink of water when 

it was handed to him but had difficulty letting go of device. Per PT notes, he 

is incontinent, dependant for transfers and dependant for eating. Unable to 

follow directions for any UE movement. No skilled needs identified at this 

time. Should his status change, I would be happy to reassess him. BALAJI OT.











VIKI RIGGINS OT Feb 12, 2018 15:50

## 2018-02-13 VITALS — DIASTOLIC BLOOD PRESSURE: 85 MMHG | SYSTOLIC BLOOD PRESSURE: 145 MMHG

## 2018-02-13 VITALS — DIASTOLIC BLOOD PRESSURE: 84 MMHG | SYSTOLIC BLOOD PRESSURE: 146 MMHG

## 2018-02-13 VITALS — DIASTOLIC BLOOD PRESSURE: 98 MMHG | SYSTOLIC BLOOD PRESSURE: 154 MMHG

## 2018-02-13 LAB
BASOPHILS # BLD AUTO: 0 10^3/UL (ref 0–0.1)
BASOPHILS # BLD AUTO: 0 10^3/UL (ref 0–0.1)
BASOPHILS NFR BLD AUTO: 0 % (ref 0–10)
BASOPHILS NFR BLD AUTO: 0 % (ref 0–10)
BASOPHILS NFR BLD MANUAL: 0 %
BUN/CREAT SERPL: 22
CALCIUM SERPL-MCNC: 8.8 MG/DL (ref 8.5–10.1)
CHLORIDE SERPL-SCNC: 102 MMOL/L (ref 98–107)
CO2 SERPL-SCNC: 23 MMOL/L (ref 21–32)
CREAT SERPL-MCNC: 0.54 MG/DL (ref 0.6–1.3)
EOSINOPHIL # BLD AUTO: 0.1 10^3/UL (ref 0–0.3)
EOSINOPHIL # BLD AUTO: 0.1 10^3/UL (ref 0–0.3)
EOSINOPHIL NFR BLD AUTO: 1 % (ref 0–10)
EOSINOPHIL NFR BLD AUTO: 1 % (ref 0–10)
EOSINOPHIL NFR BLD MANUAL: 4 %
ERYTHROCYTE [DISTWIDTH] IN BLOOD BY AUTOMATED COUNT: 12.9 % (ref 10–14.5)
ERYTHROCYTE [DISTWIDTH] IN BLOOD BY AUTOMATED COUNT: 13.2 % (ref 10–14.5)
GFR SERPLBLD BASED ON 1.73 SQ M-ARVRAT: > 60 ML/MIN
GLUCOSE SERPL-MCNC: 108 MG/DL (ref 70–105)
HCT VFR BLD CALC: 35 % (ref 40–54)
HCT VFR BLD CALC: 36 % (ref 40–54)
HGB BLD-MCNC: 12.6 G/DL (ref 13.3–17.7)
HGB BLD-MCNC: 12.8 G/DL (ref 13.3–17.7)
LYMPHOCYTES # BLD AUTO: 0.5 X 10^3 (ref 1–4)
LYMPHOCYTES # BLD AUTO: 0.6 X 10^3 (ref 1–4)
LYMPHOCYTES NFR BLD AUTO: 6 % (ref 12–44)
LYMPHOCYTES NFR BLD AUTO: 6 % (ref 12–44)
MANUAL DIFFERENTIAL PERFORMED BLD QL: NO
MANUAL DIFFERENTIAL PERFORMED BLD QL: YES
MCH RBC QN AUTO: 32 PG (ref 25–34)
MCH RBC QN AUTO: 32 PG (ref 25–34)
MCHC RBC AUTO-ENTMCNC: 35 G/DL (ref 32–36)
MCHC RBC AUTO-ENTMCNC: 36 G/DL (ref 32–36)
MCV RBC AUTO: 89 FL (ref 80–99)
MCV RBC AUTO: 90 FL (ref 80–99)
MONOCYTES # BLD AUTO: 1 X 10^3 (ref 0–1)
MONOCYTES # BLD AUTO: 1.4 X 10^3 (ref 0–1)
MONOCYTES NFR BLD AUTO: 12 % (ref 0–12)
MONOCYTES NFR BLD AUTO: 15 % (ref 0–12)
MONOCYTES NFR BLD: 8 %
NEUTROPHILS # BLD AUTO: 7.3 X 10^3 (ref 1.8–7.8)
NEUTROPHILS # BLD AUTO: 7.4 X 10^3 (ref 1.8–7.8)
NEUTROPHILS NFR BLD AUTO: 78 % (ref 42–75)
NEUTROPHILS NFR BLD AUTO: 81 % (ref 42–75)
NEUTS BAND NFR BLD MANUAL: 81 %
NEUTS BAND NFR BLD: 0 %
PLATELET # BLD: 208 10^3/UL (ref 130–400)
PLATELET # BLD: 229 10^3/UL (ref 130–400)
PMV BLD AUTO: 10.8 FL (ref 7.4–10.4)
PMV BLD AUTO: 10.8 FL (ref 7.4–10.4)
POTASSIUM SERPL-SCNC: 4 MMOL/L (ref 3.6–5)
RBC # BLD AUTO: 3.97 10^6/UL (ref 4.35–5.85)
RBC # BLD AUTO: 4.06 10^6/UL (ref 4.35–5.85)
RBC MORPH BLD: NORMAL
SODIUM SERPL-SCNC: 135 MMOL/L (ref 135–145)
VARIANT LYMPHS NFR BLD MANUAL: 7 %
WBC # BLD AUTO: 9 10^3/UL (ref 4.3–11)
WBC # BLD AUTO: 9.6 10^3/UL (ref 4.3–11)

## 2018-02-13 RX ADMIN — CARBIDOPA AND LEVODOPA SCH EA: 25; 100 TABLET ORAL at 18:31

## 2018-02-13 RX ADMIN — MORPHINE SULFATE PRN MG: 4 INJECTION, SOLUTION INTRAMUSCULAR; INTRAVENOUS at 21:41

## 2018-02-13 RX ADMIN — DIVALPROEX SODIUM SCH MG: 500 TABLET, DELAYED RELEASE ORAL at 20:19

## 2018-02-13 RX ADMIN — MUPIROCIN SCH APPLIC: 20 OINTMENT TOPICAL at 20:19

## 2018-02-13 RX ADMIN — RIVAROXABAN SCH MG: 10 TABLET, FILM COATED ORAL at 08:38

## 2018-02-13 RX ADMIN — FAMOTIDINE SCH MG: 20 TABLET, FILM COATED ORAL at 08:38

## 2018-02-13 RX ADMIN — MUPIROCIN SCH APPLIC: 20 OINTMENT TOPICAL at 08:36

## 2018-02-13 RX ADMIN — OXYCODONE HYDROCHLORIDE AND ACETAMINOPHEN PRN TAB: 5; 325 TABLET ORAL at 03:59

## 2018-02-13 RX ADMIN — RISPERIDONE SCH MG: 1 TABLET, FILM COATED ORAL at 08:38

## 2018-02-13 RX ADMIN — DIVALPROEX SODIUM SCH MG: 500 TABLET, DELAYED RELEASE ORAL at 08:38

## 2018-02-13 RX ADMIN — SODIUM CHLORIDE SCH MLS/HR: 900 INJECTION, SOLUTION INTRAVENOUS at 22:31

## 2018-02-13 RX ADMIN — RISPERIDONE SCH MG: 1 TABLET, FILM COATED ORAL at 20:19

## 2018-02-13 RX ADMIN — CARBIDOPA AND LEVODOPA SCH EA: 25; 100 TABLET ORAL at 11:58

## 2018-02-13 RX ADMIN — CARBIDOPA AND LEVODOPA SCH EA: 25; 100 TABLET ORAL at 05:44

## 2018-02-13 NOTE — PHYSICAL THERAPY DAILY NOTE
PT Daily Note-Current


Subjective


Patient is in bed and incontinent urine requiring dependent assist to change 

and cleanse.





Pain





   Numeric Pain Scale:  0-No Pain


   Location:  No Pain Reported





Mental Status


Patient Orientation:  Confused, Mumbles, Listless





Transfers


              Functional [place] Measure


0=Not Assessed/NA   4=Minimal Assistance


1=Total Assistance   5=Supervision or Setup


2=Maximal Assistance   6=Modified [place]


3=Moderate Assistance   7=Complete IndependenceIRFPAI Quality Coding Scale











6 Independent with activity with or without an assistive device


 


5  Patient requires set up or clean up by helper.  Patient completes activity  

by  themselves


 


4 Supervision or touching assist (CGA). [place] provide cues , steadying assist


 


3 The helper provides less than half the effort to complete the activity


 


2 The helper provides more than half the effort to complete the activity


 


1 Dependent.  The helper does all the effort to complete an activity 


 


7 Patient refused to complete or attempt activity


 


9 The patient did not perform the activity before the current illness or injury


 


88 Not attempted due to Medical conditions or safety concerns








Transfers (B, C, W/C) (FIM):  1


Scootin


Rollin


Supine to/from Sit:  1


Sit to/from Stand:  1


Bed to/from Chair:  1


dependent assist with all mobility and sit to stand transfer with sit to stand 

lift bed to recliner.





Weight Bearing


Right Lower Extremity:  Right


Full Weight Bearing


Left Lower Extremity:  Left


Weight Bearing/Tolerated





Assessment


Patient is resistive with all movement due to lethargy and confusion.  Patient 

requires dependent assist with all and is up in recliner with needs met.





PT Long Term Goals


Long Term Goals


PT Long Term Goals Time Frame:  2018


Transfers (B,C,W/C) (FIM):  3





PT Plan


Treatment/Plan


Treatment Plan:  Continue Plan of Care


Treatment Plan:  Bed Mobility, Education, Functional Activity Uriel, Functional 

Strength, Gait, Safety, Therapeutic Exercise, Transfers


Treatment Duration:  2018


Frequency:  6 times per week


Estimated Hrs Per Day:  .5 hour per day


Patient and/or Family Agrees t:  Yes





Time/GCodes


Time In:  1057


Time Out:  1121


Total Billed Treatment Time:  23


Total Billed Treatment


1 visit


FA x 2 23 min











WHITLEY COELLO PT 2018 11:38

## 2018-02-13 NOTE — PROGRESS NOTE (SOAP)
Subjective


Time Seen by Provider:  08:00


Subjective/Events-last exam


Patient having congestion in his chest this a.m.


Patient having confusion more according to his brother.


Tests ordered.





Objective


Exam





Vital Signs








  Date Time  Temp Pulse Resp B/P (MAP) Pulse Ox O2 Delivery O2 Flow Rate FiO2


 


18 23:55 99.8 102 16 159/85 (109) 97 Room Air  


 


18 20:30      Room Air  


 


18 16:25 99.0 98 20 155/89 (111) 96 Room Air  


 


18 09:00     95 Room Air  


 


18 08:00 99.9 106 24 160/75 (103) 95 Room Air  














I & O 


 


 18





 07:00


 


Intake Total 4075 ml


 


Balance 4075 ml





Capillary Refill : Less Than 3 SecondsLess Than 3 Seconds


General Appearance:  No Apparent Distress, WD/WN


HEENT:  Normal ENT Inspection


Neck:  Normal Inspection


Respiratory:  Other (Chest congestion)


Cardiovascular:  Regular Rate, Rhythm, No Murmur


Gastrointestinal:  non tender, soft





Results


Lab





Microbiology


18 Blood Culture - Preliminary, Resulted


         No growth


18 MRSA Screen - Final, Complete





Assessment/Plan


Assessment/Plan


Assess & Plan/Chief Complaint


Left hip fracture.


Dementia.


Schizophrenia.


Unable to get any history from patient.


Patient stable.


.


18.


Left hip fracture.


Dementia.


Schizophrenia.


Brother feels patient is not as lucid as before area


Patient has some congestion in his chest


Patient needs further evaluation





Clinical Quality Measures


DVT/VTE Risk/Contraindication:


Risk Factor Score Per Nursin


RFS Level Per Nursing on Admit:  4+=Very High











CORNELIUS SHANE DO 2018 08:02

## 2018-02-14 VITALS — SYSTOLIC BLOOD PRESSURE: 152 MMHG | DIASTOLIC BLOOD PRESSURE: 8 MMHG

## 2018-02-14 VITALS — SYSTOLIC BLOOD PRESSURE: 147 MMHG | DIASTOLIC BLOOD PRESSURE: 80 MMHG

## 2018-02-14 VITALS — DIASTOLIC BLOOD PRESSURE: 86 MMHG | SYSTOLIC BLOOD PRESSURE: 143 MMHG

## 2018-02-14 VITALS — SYSTOLIC BLOOD PRESSURE: 131 MMHG | DIASTOLIC BLOOD PRESSURE: 71 MMHG

## 2018-02-14 VITALS — DIASTOLIC BLOOD PRESSURE: 63 MMHG | SYSTOLIC BLOOD PRESSURE: 112 MMHG

## 2018-02-14 VITALS — SYSTOLIC BLOOD PRESSURE: 156 MMHG | DIASTOLIC BLOOD PRESSURE: 75 MMHG

## 2018-02-14 LAB
ALBUMIN SERPL-MCNC: 2.6 GM/DL (ref 3.2–4.5)
ALP SERPL-CCNC: 46 U/L (ref 40–136)
ALT SERPL-CCNC: 21 U/L (ref 0–55)
BASOPHILS # BLD AUTO: 0 10^3/UL (ref 0–0.1)
BASOPHILS NFR BLD AUTO: 0 % (ref 0–10)
BILIRUB SERPL-MCNC: 0.6 MG/DL (ref 0.1–1)
BUN/CREAT SERPL: 33
CALCIUM SERPL-MCNC: 8.4 MG/DL (ref 8.5–10.1)
CHLORIDE SERPL-SCNC: 104 MMOL/L (ref 98–107)
CO2 SERPL-SCNC: 24 MMOL/L (ref 21–32)
CREAT SERPL-MCNC: 0.54 MG/DL (ref 0.6–1.3)
EOSINOPHIL # BLD AUTO: 0.2 10^3/UL (ref 0–0.3)
EOSINOPHIL NFR BLD AUTO: 2 % (ref 0–10)
ERYTHROCYTE [DISTWIDTH] IN BLOOD BY AUTOMATED COUNT: 13.1 % (ref 10–14.5)
GFR SERPLBLD BASED ON 1.73 SQ M-ARVRAT: > 60 ML/MIN
GLUCOSE SERPL-MCNC: 103 MG/DL (ref 70–105)
HCT VFR BLD CALC: 34 % (ref 40–54)
HGB BLD-MCNC: 11.9 G/DL (ref 13.3–17.7)
LYMPHOCYTES # BLD AUTO: 0.6 X 10^3 (ref 1–4)
LYMPHOCYTES NFR BLD AUTO: 7 % (ref 12–44)
MANUAL DIFFERENTIAL PERFORMED BLD QL: NO
MCH RBC QN AUTO: 32 PG (ref 25–34)
MCHC RBC AUTO-ENTMCNC: 35 G/DL (ref 32–36)
MCV RBC AUTO: 91 FL (ref 80–99)
MONOCYTES # BLD AUTO: 1.1 X 10^3 (ref 0–1)
MONOCYTES NFR BLD AUTO: 13 % (ref 0–12)
NEUTROPHILS # BLD AUTO: 6.4 X 10^3 (ref 1.8–7.8)
NEUTROPHILS NFR BLD AUTO: 78 % (ref 42–75)
PLATELET # BLD: 211 10^3/UL (ref 130–400)
PMV BLD AUTO: 10.6 FL (ref 7.4–10.4)
POTASSIUM SERPL-SCNC: 4.3 MMOL/L (ref 3.6–5)
PROT SERPL-MCNC: 4.8 GM/DL (ref 6.4–8.2)
RBC # BLD AUTO: 3.73 10^6/UL (ref 4.35–5.85)
SODIUM SERPL-SCNC: 136 MMOL/L (ref 135–145)
WBC # BLD AUTO: 8.2 10^3/UL (ref 4.3–11)

## 2018-02-14 RX ADMIN — CARBIDOPA AND LEVODOPA SCH EA: 25; 100 TABLET ORAL at 14:31

## 2018-02-14 RX ADMIN — FAMOTIDINE SCH MG: 20 TABLET, FILM COATED ORAL at 08:27

## 2018-02-14 RX ADMIN — OXYCODONE HYDROCHLORIDE AND ACETAMINOPHEN PRN TAB: 5; 325 TABLET ORAL at 14:32

## 2018-02-14 RX ADMIN — ALBUTEROL SULFATE SCH MG: 2.5 SOLUTION RESPIRATORY (INHALATION) at 19:34

## 2018-02-14 RX ADMIN — RISPERIDONE SCH MG: 1 TABLET, FILM COATED ORAL at 22:29

## 2018-02-14 RX ADMIN — MUPIROCIN SCH APPLIC: 20 OINTMENT TOPICAL at 22:28

## 2018-02-14 RX ADMIN — ALBUTEROL SULFATE SCH MG: 2.5 SOLUTION RESPIRATORY (INHALATION) at 07:14

## 2018-02-14 RX ADMIN — DIVALPROEX SODIUM SCH MG: 500 TABLET, DELAYED RELEASE ORAL at 22:28

## 2018-02-14 RX ADMIN — CARBIDOPA AND LEVODOPA SCH EA: 25; 100 TABLET ORAL at 08:42

## 2018-02-14 RX ADMIN — SODIUM CHLORIDE SCH MLS/HR: 900 INJECTION, SOLUTION INTRAVENOUS at 15:00

## 2018-02-14 RX ADMIN — RISPERIDONE SCH MG: 1 TABLET, FILM COATED ORAL at 08:27

## 2018-02-14 RX ADMIN — CARBIDOPA AND LEVODOPA SCH EA: 25; 100 TABLET ORAL at 06:53

## 2018-02-14 RX ADMIN — MUPIROCIN SCH APPLIC: 20 OINTMENT TOPICAL at 08:27

## 2018-02-14 RX ADMIN — DIVALPROEX SODIUM SCH MG: 500 TABLET, DELAYED RELEASE ORAL at 08:41

## 2018-02-14 RX ADMIN — RIVAROXABAN SCH MG: 10 TABLET, FILM COATED ORAL at 08:27

## 2018-02-14 RX ADMIN — OXYCODONE HYDROCHLORIDE AND ACETAMINOPHEN PRN TAB: 5; 325 TABLET ORAL at 09:33

## 2018-02-14 NOTE — ST DYSPHAGIA EVALUATION
Speech Evaluation-General


Medical Diagnosis


left hip fracture


Onset Date:  Feb 10, 2018





Therapy Diagnosis


Therapy Diagnosis:  Mild Oral Dysphagia





Precautions


Precautions:  Aspiration


Precautions/Isolations:  Fall Prevention





Referral


Referring Physician:  Dr. Ric Putnam


Reason for Referral:  Evaluation/Treatment


Clinical Bedside Swallowing Evaluation





Medical History


Pertinent Medical History:  Dementia


Schizophrenia


Current History


The patient was recently admitted with a left hip fracture. Throughout the 

evening, the patient demonstrated increased congestion, "gurgly" throat sounds, 

and a spiked fever. Due to this, the physician requested a clinical bedside 

swallowing evaluation to evaluate for possible aspiration.


Reviewed History:  Yes





Speech PLF/Current-Dysphagia


Prior Level of Function


The patient was unable to provide prior level of function information to the 

clinician.





Subjective


The patient was seated upright in bed upon entrance. The patient kept his eyes 

shut throughout the evaluation, however, did mumble and attempt conversation 

with the clinician. The patient did not open eyes regardless of maximum 

prompting.





2/13/2018: Normal portable chest.





Cognitive Status


The patient did not respond to orientation questions.





Oral Motor Skills


Dentition:  Natural (Sparse, poor condition.)


Current Food Consistancy:  Regular, Thin Liquids


Ability to Follow Directions:  Poor


Oral Expression Ability:  Severe Impairment





Voice


Voice Phonatory-Based Quality:  Normal


Voice Pitch:  Normal


Voice Loudness:  Normal





Face


Facial Symmetry:  Symmetrical


The patient did not follow prompts for completion of the oral mechanism 

examination. Due to this, the patient's oral lingual strength and range of 

motion were evaluated informally throughout the session.





Oral-Facial Assessment


Oral-Facial Dentition:  Normal


Labial Seal Description:  Normal


Volitional Dry Swallow:  Yes





Dysphagia Evaluation


Consistencies Presented:  Regular, Thin Liquid, Pureed


Oral Phase:  Absent Oral Transit (Solid), Reduced Oral Transit (Solid)


The patient was unable to masticate the cracker and allowed the cracker to fall 

anteriorly from the mouth.


- No pharyngeal impairments were noted throughout the evaluation.


- Thin Liquid (straw, teaspoon), Puree, Solid: No signs/symptoms of aspiration 

were demonstrated with multiple boluses of thin liquid (via teaspoon or straw 

drink), puree, or solid consistencies tested.


Dietary Recommendations:  Mechanical Soft (Due to reduced mastication ability.)


Liquid Recommendations:  Thin


Crush medication and place in puree for administration.


Swallowing Precautions:  Small Bites and Sips, Sitting 90 Degrees 30 Post Intake


Dysphagia Evaluation Summary


The patient demonstrated mild oral dysphagia characterized by reduced 

mastication of solid consistencies.


Barriers to Learning


Cognition





Speech-Plan


Treatment Plan


Speech Therapy Treatment Plan:  Discontinue ST


Evaluation, only.


Frequency:  1 time per week


Estimated Hrs Per Day:  Other


Rehab Potential:  Fair





Safety Risks/Education


Teaching Recipient:  Patient (Patient's RN)


Teaching Methods:  Discussion


Response to Teaching:  Unable to Comprehend


Education Topics Provided:  


Results, Recommendations, Plan of Care, Signs/Symptoms of Aspiration





Time


Speech Therapy Time In:  08:55


Speech Therapy Time Out:  09:10


Total Billed Time:  15


Billed Treatment Time


1RUDI ELIZABETH ST Feb 14, 2018 10:13

## 2018-02-14 NOTE — PHYSICAL THERAPY DAILY NOTE
PT Daily Note-Current


Subjective


Pt sitting up in bed upon arrival.  Pt very lethargic and nonverbal for PTA 

when asking for tx.  Pt still running temp. presently.





Pain





   Location:  No Pain Reported





Mental Status


Patient Orientation:  Person, Unresponsive





Transfers


              Functional [place] Measure


0=Not Assessed/NA   4=Minimal Assistance


1=Total Assistance   5=Supervision or Setup


2=Maximal Assistance   6=Modified [place]


3=Moderate Assistance   7=Complete IndependenceIRFPAI Quality Coding Scale











6 Independent with activity with or without an assistive device


 


5  Patient requires set up or clean up by helper.  Patient completes activity  

by  themselves


 


4 Supervision or touching assist (CGA). [place] provide cues , steadying assist


 


3 The helper provides less than half the effort to complete the activity


 


2 The helper provides more than half the effort to complete the activity


 


1 Dependent.  The helper does all the effort to complete an activity 


 


7 Patient refused to complete or attempt activity


 


9 The patient did not perform the activity before the current illness or injury


 


88 Not attempted due to Medical conditions or safety concerns











Weight Bearing


Right Lower Extremity:  Right


Full Weight Bearing


Left Lower Extremity:  Left


Weight Bearing/Tolerated





Exercises


Supine Ex:  Ankle pumps, Quad Set, Heel Slides, Straight leg raise, Hip abd/add


Supine Reps:  15





Treatments


Pt is very lethargic and unresponsive during most of tx.  PTA completes Supine 

Ex in bed at [facility].  Pt rests in bed at end of tx with all needs met.





Assessment


Current Status:  Fair Progress


Pt is very lethargic and doesn't assist much with EX.





PT Long Term Goals


Long Term Goals


PT Long Term Goals Time Frame:  Feb 16, 2018


Transfers (B,C,W/C) (FIM):  3





PT Plan


Problem List


Problem List:  Activity Tolerance, Functional Strength, Safety, Balance, Gait, 

Transfer, Bed Mobility, ROM





Treatment/Plan


Treatment Plan:  Continue Plan of Care


Treatment Plan:  Bed Mobility, Education, Functional Activity Uriel, Functional 

Strength, Gait, Safety, Therapeutic Exercise, Transfers


Treatment Duration:  Feb 16, 2018


Frequency:  6 times per week


Estimated Hrs Per Day:  .5 hour per day


Patient and/or Family Agrees t:  Yes





Safety Risks/Education


Patient Education:  Correct Positioning, Safety Issues


Teaching Recipient:  Patient


Teaching Methods:  Discussion


Response to Teaching:  Reinforcement Needed





Time/GCodes


Time In:  916


Time Out:  936


Total Billed Treatment Time:  20


Total Billed Treatment


1, EX (20m)











LEO MARTIN PTA Feb 14, 2018 10:02

## 2018-02-14 NOTE — DIAGNOSTIC IMAGING REPORT
INDICATION: Decreased O2 sat.



Comparison with 02/13/2018.



FINDINGS: Portable chest show the lungs to be well-aerated. There

are no infiltrates or masses. Heart is not enlarged. There is no

pulmonary edema. No hilar adenopathy. No pneumothorax or pleural

effusion.



IMPRESSION: Normal portable chest.



Dictated by: 



  Dictated on workstation # [id number]

## 2018-02-14 NOTE — PROGRESS NOTE (SOAP)
Subjective


Time Seen by Provider:  07:55


Subjective/Events-last exam


Patient spiked a fever last night.


Patient's pulse ox went down.


Patient was gurgly in the neck and chest.


X-rays and blood tests normal.


Patient is morning much clearer in the chest.


Patient to be evaluated today for aspiration by speech therapy





Objective


Exam





Vital Signs








  Date Time  Temp Pulse Resp B/P (MAP) Pulse Ox O2 Delivery O2 Flow Rate FiO2


 


18 07:20     98 Nasal Cannula 1.00 


 


18 04:20 98.2 89 16 131/71 (91) 97 Nasal Cannula 2.00 


 


18 02:20 98.0 97 16 143/86 (105) 97 Nasal Cannula 2.00 


 


18 01:36 98.1 82 18 147/80 (102) 96 Nasal Cannula 2.00 


 


18 00:57 97.6 75 17 156/75 (102) 97 Nasal Cannula 2.00 


 


18 23:21     98 Nasal Cannula 1.00 


 


18 23:09  101   98   


 


18 23:03 99.7 103 22 145/85 (105) 97 Nasal Cannula 2.00 


 


18 22:42 99.8       


 


18 21:30 100.3       


 


18 21:00     93 Room Air  


 


18 20:35 101.5       


 


18 16:00 99.0 101 17 146/84 (104) 96 Room Air  


 


18 09:00     93 Room Air  


 


18 08:00 98.2 101 20 154/98 (116) 93 Room Air  














I & O 


 


 18





 07:00


 


Intake Total 1112 ml


 


Balance 1112 ml





Capillary Refill : Less Than 3 SecondsLess Than 3 Seconds


General Appearance:  No Apparent Distress, WD/WN


HEENT:  Normal ENT Inspection


Neck:  Normal Inspection


Respiratory:  Lungs Clear, Normal Breath Sounds, No Accessory Muscle Use, No 

Respiratory Distress


Cardiovascular:  Regular Rate, Rhythm, No Murmur


Gastrointestinal:  non tender, soft





Results


Lab


Laboratory Tests


18 08:17








18 21:48








18 06:00








Laboratory Tests


18 08:17: 


White Blood Count 9.0, Red Blood Count 3.97L, Hemoglobin 12.6L, Hematocrit 35L, 

Mean Corpuscular Volume 89, Mean Corpuscular Hemoglobin 32, Mean Corpuscular 

Hemoglobin Concent 36, Red Cell Distribution Width 12.9, Platelet Count 208, 

Mean Platelet Volume 10.8H, Neutrophils (%) (Auto) 81H, Lymphocytes (%) (Auto) 

6L, Monocytes (%) (Auto) 12, Eosinophils (%) (Auto) 1, Basophils (%) (Auto) 0, 

Neutrophils # (Auto) 7.3, Lymphocytes # (Auto) 0.5L, Monocytes # (Auto) 1.0, 

Eosinophils # (Auto) 0.1, Basophils # (Auto) 0.0, Sodium Level 135, Potassium 

Level 4.0, Chloride Level 102, Carbon Dioxide Level 23, Anion Gap 10, Blood 

Urea Nitrogen 12, Creatinine 0.54L, Estimat Glomerular Filtration Rate > 60, BUN

/Creatinine Ratio 22, Glucose Level 108H, Calcium Level 8.8, B-Type Natriuretic 

Peptide 30.2, Valproic Acid (Depakene) Level 60.7


18 21:48: 


White Blood Count 9.6, Red Blood Count 4.06L, Hemoglobin 12.8L, Hematocrit 36L, 

Mean Corpuscular Volume 90, Mean Corpuscular Hemoglobin 32, Mean Corpuscular 

Hemoglobin Concent 35, Red Cell Distribution Width 13.2, Platelet Count 229, 

Mean Platelet Volume 10.8H, Neutrophils (%) (Auto) 78H, Lymphocytes (%) (Auto) 

6L, Monocytes (%) (Auto) 15H, Eosinophils (%) (Auto) 1, Basophils (%) (Auto) 0, 

Neutrophils # (Auto) 7.4, Lymphocytes # (Auto) 0.6L, Monocytes # (Auto) 1.4H, 

Eosinophils # (Auto) 0.1, Basophils # (Auto) 0.0, Neutrophils % (Manual) 81, 

Lymphocytes % (Manual) 7, Monocytes % (Manual) 8, Eosinophils % (Manual) 4, 

Basophils % (Manual) 0, Band Neutrophils 0, Blood Morphology Comment NORMAL, 

Lactic Acid Level 1.23


18 06:00: 


White Blood Count 8.2, Red Blood Count 3.73L, Hemoglobin 11.9L, Hematocrit 34L, 

Mean Corpuscular Volume 91, Mean Corpuscular Hemoglobin 32, Mean Corpuscular 

Hemoglobin Concent 35, Red Cell Distribution Width 13.1, Platelet Count 211, 

Mean Platelet Volume 10.6H, Neutrophils (%) (Auto) 78H, Lymphocytes (%) (Auto) 

7L, Monocytes (%) (Auto) 13H, Eosinophils (%) (Auto) 2, Basophils (%) (Auto) 0, 

Neutrophils # (Auto) 6.4, Lymphocytes # (Auto) 0.6L, Monocytes # (Auto) 1.1H, 

Eosinophils # (Auto) 0.2, Basophils # (Auto) 0.0, Sodium Level 136, Potassium 

Level 4.3, Chloride Level 104, Carbon Dioxide Level 24, Anion Gap 8, Blood Urea 

Nitrogen 18, Creatinine 0.54L, Estimat Glomerular Filtration Rate > 60, BUN/

Creatinine Ratio 33, Glucose Level 103, Calcium Level 8.4L, Total Bilirubin 0.6

, Aspartate Amino Transf (AST/SGOT) 28, Alanine Aminotransferase (ALT/SGPT) 21, 

Alkaline Phosphatase 46, Total Protein 4.8L, Albumin 2.6L





Microbiology


18 Blood Culture - Preliminary, Resulted


         No growth


18 MRSA Screen - Final, Complete





Assessment/Plan


Assessment/Plan


Assess & Plan/Chief Complaint


Left hip fracture.


Dementia.


Schizophrenia.


Unable to get any history from patient.


Patient stable.


.


18.


Left hip fracture.


Dementia.


Schizophrenia.


Brother feels patient is not as lucid as before area


Patient has some congestion in his chest


Patient needs further evaluation.


.


18.


Left hip fracture.


Dementia.


Schizophrenia.


Patient running an elevated temperature last night and pulse ox lower.


Patient needs speech therapy for aspiration.


Patient this morning sounds better than yesterday and looks better





Clinical Quality Measures


DVT/VTE Risk/Contraindication:


Risk Factor Score Per Nursin


RFS Level Per Nursing on Admit:  4+=Very High











CORNELIUS SHANE DO 2018 08:00

## 2018-02-15 VITALS — SYSTOLIC BLOOD PRESSURE: 130 MMHG | DIASTOLIC BLOOD PRESSURE: 69 MMHG

## 2018-02-15 VITALS — SYSTOLIC BLOOD PRESSURE: 129 MMHG | DIASTOLIC BLOOD PRESSURE: 70 MMHG

## 2018-02-15 LAB
BASOPHILS # BLD AUTO: 0 10^3/UL (ref 0–0.1)
BASOPHILS NFR BLD AUTO: 0 % (ref 0–10)
BUN/CREAT SERPL: 38
CALCIUM SERPL-MCNC: 8.7 MG/DL (ref 8.5–10.1)
CHLORIDE SERPL-SCNC: 105 MMOL/L (ref 98–107)
CO2 SERPL-SCNC: 22 MMOL/L (ref 21–32)
CREAT SERPL-MCNC: 0.56 MG/DL (ref 0.6–1.3)
EOSINOPHIL # BLD AUTO: 0.2 10^3/UL (ref 0–0.3)
EOSINOPHIL NFR BLD AUTO: 3 % (ref 0–10)
ERYTHROCYTE [DISTWIDTH] IN BLOOD BY AUTOMATED COUNT: 13.5 % (ref 10–14.5)
GFR SERPLBLD BASED ON 1.73 SQ M-ARVRAT: > 60 ML/MIN
GLUCOSE SERPL-MCNC: 107 MG/DL (ref 70–105)
HCT VFR BLD CALC: 34 % (ref 40–54)
HGB BLD-MCNC: 11.6 G/DL (ref 13.3–17.7)
LYMPHOCYTES # BLD AUTO: 0.7 X 10^3 (ref 1–4)
LYMPHOCYTES NFR BLD AUTO: 10 % (ref 12–44)
MANUAL DIFFERENTIAL PERFORMED BLD QL: NO
MCH RBC QN AUTO: 32 PG (ref 25–34)
MCHC RBC AUTO-ENTMCNC: 34 G/DL (ref 32–36)
MCV RBC AUTO: 92 FL (ref 80–99)
MONOCYTES # BLD AUTO: 1.1 X 10^3 (ref 0–1)
MONOCYTES NFR BLD AUTO: 16 % (ref 0–12)
NEUTROPHILS # BLD AUTO: 4.8 X 10^3 (ref 1.8–7.8)
NEUTROPHILS NFR BLD AUTO: 71 % (ref 42–75)
PLATELET # BLD: 281 10^3/UL (ref 130–400)
PMV BLD AUTO: 10.8 FL (ref 7.4–10.4)
POTASSIUM SERPL-SCNC: 4.3 MMOL/L (ref 3.6–5)
RBC # BLD AUTO: 3.68 10^6/UL (ref 4.35–5.85)
SODIUM SERPL-SCNC: 139 MMOL/L (ref 135–145)
WBC # BLD AUTO: 6.7 10^3/UL (ref 4.3–11)

## 2018-02-15 RX ADMIN — RISPERIDONE SCH MG: 1 TABLET, FILM COATED ORAL at 09:04

## 2018-02-15 RX ADMIN — ALBUTEROL SULFATE SCH MG: 2.5 SOLUTION RESPIRATORY (INHALATION) at 06:52

## 2018-02-15 RX ADMIN — FAMOTIDINE SCH MG: 20 TABLET, FILM COATED ORAL at 09:07

## 2018-02-15 RX ADMIN — MUPIROCIN SCH APPLIC: 20 OINTMENT TOPICAL at 09:07

## 2018-02-15 RX ADMIN — CARBIDOPA AND LEVODOPA SCH EA: 25; 100 TABLET ORAL at 12:53

## 2018-02-15 RX ADMIN — RIVAROXABAN SCH MG: 10 TABLET, FILM COATED ORAL at 09:07

## 2018-02-15 RX ADMIN — CARBIDOPA AND LEVODOPA SCH EA: 25; 100 TABLET ORAL at 06:17

## 2018-02-15 RX ADMIN — SODIUM CHLORIDE SCH MLS/HR: 900 INJECTION, SOLUTION INTRAVENOUS at 09:08

## 2018-02-15 RX ADMIN — DIVALPROEX SODIUM SCH MG: 500 TABLET, DELAYED RELEASE ORAL at 09:07

## 2018-02-15 NOTE — PHYSICAL THERAPY DAILY NOTE
PT Daily Note-Current


Subjective


Pt sitting up in bed but is slumped toward R side upon arrival.  Aide asked for 

PTA assist to transfer to EOB to clean up.





Mental Status


Patient Orientation:  Person, [place]





Transfers


              Functional [place] Measure


0=Not Assessed/NA   4=Minimal Assistance


1=Total Assistance   5=Supervision or Setup


2=Maximal Assistance   6=Modified [place]


3=Moderate Assistance   7=Complete IndependenceIRFPAI Quality Coding Scale











6 Independent with activity with or without an assistive device


 


5  Patient requires set up or clean up by helper.  Patient completes activity  

by  themselves


 


4 Supervision or touching assist (CGA). [place] provide cues , steadying assist


 


3 The helper provides less than half the effort to complete the activity


 


2 The helper provides more than half the effort to complete the activity


 


1 Dependent.  The helper does all the effort to complete an activity 


 


7 Patient refused to complete or attempt activity


 


9 The patient did not perform the activity before the current illness or injury


 


88 Not attempted due to Medical conditions or safety concerns








Scootin


Rollin


Supine to/from Sit:  1


Sit to/from Stand:  1


Bed to/from Chair:  1





Weight Bearing


Right Lower Extremity:  Right


Full Weight Bearing


Left Lower Extremity:  Left


Weight Bearing/Tolerated





Treatments


PTA & Aide assist pt at Max A from Supine to EOB to wipe down & change gown 

before using Sit to Stand lift to transfer to recliner.  Pt positioned & 

recliner in recliner at end of transfer with all needs met.





Assessment


Current Status:  Poor Progress


Pt fought/pushed back against transferring from supine to EOB and sitting at 

EOB.  PTA sees this as both weakness and behavioral.





PT Long Term Goals


Long Term Goals


PT Long Term Goals Time Frame:  2018


Transfers (B,C,W/C) (FIM):  3





PT Plan


Problem List


Problem List:  Activity Tolerance, Functional Strength, Safety, Balance, Gait, 

Transfer, Bed Mobility, ROM





Treatment/Plan


Treatment Plan:  Continue Plan of Care


Treatment Plan:  Bed Mobility, Education, Functional Activity Uriel, Functional 

Strength, Gait, Safety, Therapeutic Exercise, Transfers


Treatment Duration:  2018


Frequency:  6 times per week


Estimated Hrs Per Day:  .5 hour per day


Patient and/or Family Agrees t:  Yes





Safety Risks/Education


Patient Education:  Correct Positioning, Safety Issues


Teaching Recipient:  Patient


Teaching Methods:  Discussion


Response to Teaching:  Reinforcement Needed





Time/GCodes


Time In:  940


Time Out:  1000


Total Billed Treatment Time:  20


Total Billed Treatment


1, FA (20m)











ELO MARTIN PTA Feb 15, 2018 11:03

## 2018-02-15 NOTE — PROGRESS NOTE-STANDARD
Standard Progress Note


Progress Notes/Assess & Plan


Date Seen by Provider:  Feb 15, 2018


Time Seen by Provider:  07:09


Progress/Assessment & Plan


Pt ELLY, appears at baseline MS, barely participates in exam, appears stable.


VSSAF


LLE: incision left hip c/d/i; all compartments soft/NT, motor/sensation grossly 

intact, foot well perfused.


S/P Bipolar Hemiarthroplasty Left Hip, POD #5


Orthopedically stable


Continue to mobilize OOB with PT/OT daily, anterior hip precautions as able, 

WBAT LLE


Xarelto for 3 weeks for VTE prophylaxis


Current pain control regimen


Follow-up outpatient in 2 weeks.











NEVILLE GILMORE DO Feb 15, 2018 07:12

## 2018-02-15 NOTE — PROGRESS NOTE (SOAP)
Subjective


Time Seen by Provider:  08:05


Subjective/Events-last exam


Left hip fracture.


Patient resting comfortably.


Patient to be discharged today





Objective


Exam





Vital Signs








  Date Time  Temp Pulse Resp B/P (MAP) Pulse Ox O2 Delivery O2 Flow Rate FiO2


 


2/15/18 06:50      Room Air  


 


2/15/18 00:00 98.9 92 20 130/69 (89) 94 Room Air  


 


18 20:00     94 Room Air 1.00 


 


18 19:34     96 Room Air  


 


18 16:32 98.2 102 16 112/63 (79) 93 Room Air  


 


18 09:00     95 Nasal Cannula 1.00 














I & O 


 


 2/15/18





 07:00


 


Intake Total 880 ml


 


Balance 880 ml





Capillary Refill : Less Than 3 SecondsLess Than 3 Seconds


General Appearance:  No Apparent Distress, WD/WN





Results


Lab


Laboratory Tests


2/15/18 05:25








Laboratory Tests


2/15/18 05:25: 


White Blood Count 6.7, Red Blood Count 3.68L, Hemoglobin 11.6L, Hematocrit 34L, 

Mean Corpuscular Volume 92, Mean Corpuscular Hemoglobin 32, Mean Corpuscular 

Hemoglobin Concent 34, Red Cell Distribution Width 13.5, Platelet Count 281, 

Mean Platelet Volume 10.8H, Neutrophils (%) (Auto) 71, Lymphocytes (%) (Auto) 

10L, Monocytes (%) (Auto) 16H, Eosinophils (%) (Auto) 3, Basophils (%) (Auto) 0

, Neutrophils # (Auto) 4.8, Lymphocytes # (Auto) 0.7L, Monocytes # (Auto) 1.1H, 

Eosinophils # (Auto) 0.2, Basophils # (Auto) 0.0, Sodium Level 139, Potassium 

Level 4.3, Chloride Level 105, Carbon Dioxide Level 22, Anion Gap 12, Blood 

Urea Nitrogen 21H, Creatinine 0.56L, Estimat Glomerular Filtration Rate > 60, 

BUN/Creatinine Ratio 38, Glucose Level 107H, Calcium Level 8.7





Microbiology


18 Blood Culture - Preliminary, Resulted


          No growth


18 MRSA Screen - Final, Complete





Assessment/Plan


Assessment/Plan


Assess & Plan/Chief Complaint


Left hip fracture.


Dementia.


Schizophrenia.


Unable to get any history from patient.


Patient stable.


.


18.


Left hip fracture.


Dementia.


Schizophrenia.


Brother feels patient is not as lucid as before area


Patient has some congestion in his chest


Patient needs further evaluation.


.


18.


Left hip fracture.


Dementia.


Schizophrenia.


Patient running an elevated temperature last night and pulse ox lower.


Patient needs speech therapy for aspiration.


Patient this morning sounds better than yesterday and looks better.


.


2/15/18.


Left hip fracture.


Dementia.


Schizophrenic.


Patient resting comfortably.


Patient be discharged today area


Patient to be 3 weeks on blood thinners





Clinical Quality Measures


DVT/VTE Risk/Contraindication:


Risk Factor Score Per Nursin


RFS Level Per Nursing on Admit:  4+=Very High











CORNELIUS SHANE DO Feb 15, 2018 08:06

## 2018-11-09 NOTE — PHYSICAL THERAPY PROGRESS NOTE
Therapy Progress Note


PT orders received.  Pt currently in surgery.  PT eval to be done 2/11/18











AYLA FRANCISCO PT Feb 10, 2018 13:40 WDL

## 2019-09-24 ENCOUNTER — HOSPITAL ENCOUNTER (OUTPATIENT)
Dept: HOSPITAL 75 - RAD | Age: 59
End: 2019-09-24
Attending: FAMILY MEDICINE
Payer: MEDICAID

## 2019-09-24 DIAGNOSIS — L03.114: Primary | ICD-10-CM

## 2019-09-24 PROCEDURE — 73080 X-RAY EXAM OF ELBOW: CPT

## 2019-09-24 PROCEDURE — 87070 CULTURE OTHR SPECIMN AEROBIC: CPT

## 2019-09-24 PROCEDURE — 87186 SC STD MICRODIL/AGAR DIL: CPT

## 2019-09-24 PROCEDURE — 87205 SMEAR GRAM STAIN: CPT

## 2019-09-24 PROCEDURE — 87077 CULTURE AEROBIC IDENTIFY: CPT

## 2019-09-24 NOTE — DIAGNOSTIC IMAGING REPORT
INDICATION: Cellulitis of the left elbow.



COMPARISON: None.



FINDINGS: Three radiographic views of the left elbow were

obtained and show asymmetric soft tissue swelling posterior to

the distal humerus. Underlying osseous structures appear to be

intact without evidence of convincing osteolysis. There is no

evidence of acute fracture or dislocation. Joint spaces are

maintained. No unexpected radiopaque foreign bodies are seen.



IMPRESSION:

1. Soft tissue swelling over the posterior left elbow.

Correlation with underlying cellulitis is recommended.

2. No convincing evidence of an osteolytic process to suggest

osteomyelitis. Please note, however, that osteomyelitis cannot be

excluded based on radiographs alone. If there is concern for

osteomyelitis, MRI is recommended.



Dictated by: 



  Dictated on workstation # [id number]

## 2020-08-23 ENCOUNTER — HOSPITAL ENCOUNTER (INPATIENT)
Dept: HOSPITAL 75 - ER | Age: 60
LOS: 3 days | Discharge: NURSING, REHAB OR LONG-TERM CARE | DRG: 194 | End: 2020-08-26
Attending: INTERNAL MEDICINE | Admitting: INTERNAL MEDICINE
Payer: MEDICAID

## 2020-08-23 VITALS — DIASTOLIC BLOOD PRESSURE: 62 MMHG | SYSTOLIC BLOOD PRESSURE: 93 MMHG

## 2020-08-23 VITALS — BODY MASS INDEX: 24.2 KG/M2 | WEIGHT: 172.84 LBS | HEIGHT: 70.87 IN

## 2020-08-23 VITALS — DIASTOLIC BLOOD PRESSURE: 57 MMHG | SYSTOLIC BLOOD PRESSURE: 88 MMHG

## 2020-08-23 VITALS — SYSTOLIC BLOOD PRESSURE: 91 MMHG | DIASTOLIC BLOOD PRESSURE: 62 MMHG

## 2020-08-23 VITALS — DIASTOLIC BLOOD PRESSURE: 58 MMHG | SYSTOLIC BLOOD PRESSURE: 90 MMHG

## 2020-08-23 VITALS — DIASTOLIC BLOOD PRESSURE: 78 MMHG | SYSTOLIC BLOOD PRESSURE: 109 MMHG

## 2020-08-23 VITALS — DIASTOLIC BLOOD PRESSURE: 84 MMHG | SYSTOLIC BLOOD PRESSURE: 131 MMHG

## 2020-08-23 DIAGNOSIS — F32.9: ICD-10-CM

## 2020-08-23 DIAGNOSIS — J18.9: Primary | ICD-10-CM

## 2020-08-23 DIAGNOSIS — F02.81: ICD-10-CM

## 2020-08-23 DIAGNOSIS — R13.10: ICD-10-CM

## 2020-08-23 DIAGNOSIS — Z20.828: ICD-10-CM

## 2020-08-23 DIAGNOSIS — Z66: ICD-10-CM

## 2020-08-23 DIAGNOSIS — I10: ICD-10-CM

## 2020-08-23 DIAGNOSIS — F20.9: ICD-10-CM

## 2020-08-23 DIAGNOSIS — G20: ICD-10-CM

## 2020-08-23 DIAGNOSIS — G40.909: ICD-10-CM

## 2020-08-23 DIAGNOSIS — J91.8: ICD-10-CM

## 2020-08-23 DIAGNOSIS — K59.09: ICD-10-CM

## 2020-08-23 LAB
ALBUMIN SERPL-MCNC: 3.1 GM/DL (ref 3.2–4.5)
ALP SERPL-CCNC: 141 U/L (ref 40–136)
ALT SERPL-CCNC: 24 U/L (ref 0–55)
APPEARANCE FLD: CLEAR
APTT BLD: 39 SEC (ref 24–35)
APTT PPP: (no result) S
BACTERIA #/AREA URNS HPF: NEGATIVE /HPF
BASOPHILS # BLD AUTO: 0 10^3/UL (ref 0–0.1)
BASOPHILS NFR BLD AUTO: 0 % (ref 0–10)
BILIRUB SERPL-MCNC: 0.3 MG/DL (ref 0.1–1)
BILIRUB UR QL STRIP: NEGATIVE
BODY FLUID SOURCE: (no result)
BUN/CREAT SERPL: 27
CALCIUM SERPL-MCNC: 8.8 MG/DL (ref 8.5–10.1)
CHLORIDE SERPL-SCNC: 104 MMOL/L (ref 98–107)
CO2 SERPL-SCNC: 22 MMOL/L (ref 21–32)
COLOR FLD: YELLOW
CREAT SERPL-MCNC: 0.66 MG/DL (ref 0.6–1.3)
EOSINOPHIL # BLD AUTO: 0.3 10^3/UL (ref 0–0.3)
EOSINOPHIL NFR BLD AUTO: 3 % (ref 0–10)
ERYTHROCYTE [DISTWIDTH] IN BLOOD BY AUTOMATED COUNT: 13.7 % (ref 10–14.5)
FIBRINOGEN PPP-MCNC: CLEAR MG/DL
GFR SERPLBLD BASED ON 1.73 SQ M-ARVRAT: > 60 ML/MIN
GLUCOSE FLD-MCNC: 106 MG/DL
GLUCOSE SERPL-MCNC: 123 MG/DL (ref 70–105)
GLUCOSE UR STRIP-MCNC: NEGATIVE MG/DL
HCT VFR BLD CALC: 35 % (ref 40–54)
HGB BLD-MCNC: 11.8 G/DL (ref 13.3–17.7)
INR PPP: 1.2 (ref 0.8–1.4)
KETONES UR QL STRIP: (no result)
LDH FLD-CCNC: 338 U/L
LEUKOCYTE ESTERASE UR QL STRIP: NEGATIVE
LYMPHOCYTES # BLD AUTO: 1.1 X 10^3 (ref 1–4)
LYMPHOCYTES NFR BLD AUTO: 11 % (ref 12–44)
LYMPHOCYTES NFR FLD MANUAL: 34 %
MANUAL DIFFERENTIAL PERFORMED BLD QL: NO
MCH RBC QN AUTO: 30 PG (ref 25–34)
MCHC RBC AUTO-ENTMCNC: 34 G/DL (ref 32–36)
MCV RBC AUTO: 90 FL (ref 80–99)
MONOCYTES # BLD AUTO: 1.5 X 10^3 (ref 0–1)
MONOCYTES NFR BLD AUTO: 14 % (ref 0–12)
NEUTROPHILS # BLD AUTO: 7.7 X 10^3 (ref 1.8–7.8)
NEUTROPHILS NFR BLD AUTO: 73 % (ref 42–75)
NITRITE UR QL STRIP: NEGATIVE
PH FLD: 8 [PH]
PH UR STRIP: 6.5 [PH] (ref 5–9)
PLATELET # BLD: 456 10^3/UL (ref 130–400)
PMV BLD AUTO: 10 FL (ref 7.4–10.4)
POTASSIUM SERPL-SCNC: 4.5 MMOL/L (ref 3.6–5)
PROT FLD-MCNC: 4.6 G/DL
PROT SERPL-MCNC: 6.9 GM/DL (ref 6.4–8.2)
PROT UR QL STRIP: (no result)
PROTHROMBIN TIME: 15.2 SEC (ref 12.2–14.7)
RBC # FLD: 2750 /UL
RBC #/AREA URNS HPF: (no result) /HPF
SODIUM SERPL-SCNC: 137 MMOL/L (ref 135–145)
SP GR UR STRIP: 1.02 (ref 1.02–1.02)
SQUAMOUS #/AREA URNS HPF: (no result) /HPF
WBC # BLD AUTO: 10.5 10^3/UL (ref 4.3–11)
WBC # FLD: 875 /UL
WBC #/AREA URNS HPF: (no result) /HPF

## 2020-08-23 PROCEDURE — 87040 BLOOD CULTURE FOR BACTERIA: CPT

## 2020-08-23 PROCEDURE — 88305 TISSUE EXAM BY PATHOLOGIST: CPT

## 2020-08-23 PROCEDURE — 85025 COMPLETE CBC W/AUTO DIFF WBC: CPT

## 2020-08-23 PROCEDURE — 0W9B3ZZ DRAINAGE OF LEFT PLEURAL CAVITY, PERCUTANEOUS APPROACH: ICD-10-PCS | Performed by: SURGERY

## 2020-08-23 PROCEDURE — 82945 GLUCOSE OTHER FLUID: CPT

## 2020-08-23 PROCEDURE — 83735 ASSAY OF MAGNESIUM: CPT

## 2020-08-23 PROCEDURE — 85610 PROTHROMBIN TIME: CPT

## 2020-08-23 PROCEDURE — 81000 URINALYSIS NONAUTO W/SCOPE: CPT

## 2020-08-23 PROCEDURE — 71260 CT THORAX DX C+: CPT

## 2020-08-23 PROCEDURE — 51701 INSERT BLADDER CATHETER: CPT

## 2020-08-23 PROCEDURE — 83615 LACTATE (LD) (LDH) ENZYME: CPT

## 2020-08-23 PROCEDURE — 88112 CYTOPATH CELL ENHANCE TECH: CPT

## 2020-08-23 PROCEDURE — 80048 BASIC METABOLIC PNL TOTAL CA: CPT

## 2020-08-23 PROCEDURE — 87635 SARS-COV-2 COVID-19 AMP PRB: CPT

## 2020-08-23 PROCEDURE — 87077 CULTURE AEROBIC IDENTIFY: CPT

## 2020-08-23 PROCEDURE — 71045 X-RAY EXAM CHEST 1 VIEW: CPT

## 2020-08-23 PROCEDURE — 84157 ASSAY OF PROTEIN OTHER: CPT

## 2020-08-23 PROCEDURE — 83986 ASSAY PH BODY FLUID NOS: CPT

## 2020-08-23 PROCEDURE — 85730 THROMBOPLASTIN TIME PARTIAL: CPT

## 2020-08-23 PROCEDURE — 87070 CULTURE OTHR SPECIMN AEROBIC: CPT

## 2020-08-23 PROCEDURE — 89051 BODY FLUID CELL COUNT: CPT

## 2020-08-23 PROCEDURE — 87186 SC STD MICRODIL/AGAR DIL: CPT

## 2020-08-23 PROCEDURE — 87205 SMEAR GRAM STAIN: CPT

## 2020-08-23 PROCEDURE — 87088 URINE BACTERIA CULTURE: CPT

## 2020-08-23 PROCEDURE — 84145 PROCALCITONIN (PCT): CPT

## 2020-08-23 PROCEDURE — 36415 COLL VENOUS BLD VENIPUNCTURE: CPT

## 2020-08-23 PROCEDURE — 84100 ASSAY OF PHOSPHORUS: CPT

## 2020-08-23 PROCEDURE — 71275 CT ANGIOGRAPHY CHEST: CPT

## 2020-08-23 PROCEDURE — 80053 COMPREHEN METABOLIC PANEL: CPT

## 2020-08-23 PROCEDURE — 83880 ASSAY OF NATRIURETIC PEPTIDE: CPT

## 2020-08-23 PROCEDURE — 83605 ASSAY OF LACTIC ACID: CPT

## 2020-08-23 RX ADMIN — SODIUM CHLORIDE, SODIUM LACTATE, POTASSIUM CHLORIDE, AND CALCIUM CHLORIDE SCH MLS/HR: 600; 310; 30; 20 INJECTION, SOLUTION INTRAVENOUS at 22:00

## 2020-08-23 RX ADMIN — SODIUM CHLORIDE SCH MLS/HR: 900 INJECTION INTRAVENOUS at 22:24

## 2020-08-23 NOTE — NUR
GUILHERME RIVERA OBTAINED PHONE CONSENT FOR LEFT THORACENTESIS FROM PT SISTER 
KESHA AND WITNESSED BY BARAK MINOR RN.

## 2020-08-23 NOTE — DIAGNOSTIC IMAGING REPORT
Clinical indication: Postthoracentesis.



Exam: Portable chest x-ray upright view.



Comparisons: Chest x-ray dated 08/23/2020.



Findings: There is interval significant decrease in the

previously seen large left pleural effusion with small to

moderate amount remaining. There is no pneumothorax. There is

groundglass opacification involving the left lung and left lung

base patchy airspace opacities. These findings may be related to

atelectasis and/or infiltrate. Right lung is clear. There is

cardiomegaly with no significant pulmonary vascular congestion.

The remainder of this exam shows no significant interval change

compared to the prior study of comparison.



Impression:

1: Interval decreased size of the left pleural effusion with

small to moderate amount remaining. There is no pneumothorax.

2: There is residual left lung atelectasis and/or infiltrate.

3: Stable cardiomegaly with no significant pulmonary vascular

congestion. 



Dictated by: 



  Dictated on workstation # [id number]

## 2020-08-23 NOTE — DIAGNOSTIC IMAGING REPORT
Clinical indication: Patient with cough.



Exam: Portable chest x-ray upright view.



Comparisons: Chest x-ray dated 02/13/2018.



Findings: There is interval development of near complete

consolidation of the left hemithorax. There are air filled

portions of the left upper lobe. This is concerning for large

left pleural effusion. Evaluation for lung pathology in this left

hemithorax region is limited. There is no right pleural effusion.

There is no pneumothorax. There is interval cardiomegaly. There

is no significant pulmonary vascular congestion.



Impression:

1: Interval development of a large left-sided pleural effusion.

Due to the pleural effusion, evaluation of left lung pathology is

limited. CT scan of the chest with contrast is suggested for

further evaluation.

2: There is interval cardiomegaly with no significant pulmonary

vascular congestion. 



Dictated by: 



  Dictated on workstation # [id number]

## 2020-08-23 NOTE — ED GENERAL
General


Stated Complaint:  COUGH/FEVER


Source of Information:  EMS


Exam Limitations:  Physical Impairments





History of Present Illness


Date Seen by Provider:  Aug 23, 2020


Time Seen by Provider:  16:49


Initial Comments


To ER by EMS from [facility] with reports of a cough and 

fever. There are currently no COVID cases at [facility] 

and he hasn't traveled and they're not allowing visitors. Patient is unable to 

contribute to history of present illness. PCP is Dr Shane, he is DNR status.


Timing/Duration:  1-2 Days


Associated Systoms:  Cough





Allergies and Home Medications


Allergies


Coded Allergies:  


     No Known Drug Allergies (Unverified , 18)





Home Medications


Acetaminophen 325 Mg Tablet, 650 MG PO Q4H PRN for PAIN-MILD, (Reported)


   TAKES 2 (325 MG) TABLETS 


Carbidopa/Levodopa 1 Each Tablet, 1 TAB PO TID, (Reported)


Diltiazem HCl 120 Mg Tablet, 120 MG PO DAILY, (Reported)


Divalproex Sodium 500 Mg Tablet.dr, 500 MG PO HS, (Reported)


Divalproex Sodium 500 Mg Tablet.dr, 1,000 MG PO DAILY, (Reported)


Entacapone 200 Mg Tablet, 200 MG PO BID, (Reported)


Paroxetine HCl 20 Mg Tablet, 20 MG PO DAILY, (Reported)


Risperidone 0.5 Mg Tablet, 0.5 MG PO BID, (Reported)


Rivaroxaban 10 Mg Tablet, 10 MG PO DAILY


   x 3 weeks, then stop 


   Prescribed by: YADIRA TIERNEY on 2/15/18 0938


Trazodone HCl 50 Mg Tablet, 50 MG PO HS, (Reported)





Patient Home Medication List


Home Medication List Reviewed:  Yes





Review of Systems


Review of Systems


Constitutional:  see HPI, other (Unable unable to obtain due to baseline 

dementia +/- acute delirium)





Past Medical-Social-Family Hx


Patient Social History


Recent Hopitalizations:  No (unknown)





Seasonal Allergies


Seasonal Allergies:  No





Past Medical History


Respiratory:  No


Cardiac:  No


Neurological:  Yes


Dementia, Parkinson's Disease


Gastrointestinal:  No


Musculoskeletal:  No


Psychosocial:  Yes (MANIPULATIVE BEHAVIORS, SEXUAL INAPROPRIATENESS)


Schizophrenia


Blood Disorders:  No


Adverse Reaction/Blood Tranf:  No





Family Medical History


No Pertinent Family Hx





Physical Exam


Vital Signs





Vital Signs - First Documented








 20





 16:45 16:57 18:20


 


Temp  37.3 


 


Pulse  96 


 


Resp  18 


 


B/P (MAP)  120/81 (94) 


 


Pulse Ox  93 


 


O2 Delivery Room Air  


 


O2 Flow Rate   2.00





Capillary Refill :


Height, Weight, BMI


Height: 5'8.00"


Weight: 183lbs. 3.0oz. 83.271827if; 27.9 BMI


Method:Estimated


General Appearance:  No Apparent Distress, WD/WN, Other (then. Bedridden. 

Mumbles incoherently. Lungs are clear with good air movement except for the left

base which is diminished.)


Respiratory:  Normal Breath Sounds, No Accessory Muscle Use, No Respiratory 

Distress, Other (95% on room air)


Cardiovascular:  Regular Rate, Rhythm, Normal Peripheral Pulses


Gastrointestinal:  Normal Bowel Sounds, Non Tender, Soft


Extremity:  Normal Capillary Refill, Normal Inspection


Neurologic/Psychiatric:  Alert, Other (eyes open looking around the room, does 

not follow commands, mumbles incoherently. )


Skin:  Normal Color, Warm/Dry





Focused Exam


Lactate Level


20 16:54: Lactic Acid Level 1.10





Lactic Acid Level





Laboratory Tests








Test


 20


16:54


 


Lactic Acid Level


 1.10 MMOL/L


(0.50-2.00)











Progress/Results/Core Measures


Suspected Sepsis


SIRS


Temperature: 


Pulse:  


Respiratory Rate: 


 


Laboratory Tests


20 16:54: White Blood Count 10.5


Blood Pressure  / 


Mean: 


 





20 16:54: Lactic Acid Level 1.10


Laboratory Tests


20 16:54: 


Creatinine 0.66, INR Comment 1.2, Platelet Count 456H, Total Bilirubin 0.3








Results/Orders


Lab Results





Laboratory Tests








Test


 20


16:54 20


17:22 20


20:15 Range/Units


 


 


White Blood Count


 10.5 


 


 


 4.3-11.0


10^3/uL


 


Red Blood Count


 3.90 L


 


 


 4.35-5.85


10^6/uL


 


Hemoglobin 11.8 L   13.3-17.7  G/DL


 


Hematocrit 35 L   40-54  %


 


Mean Corpuscular Volume 90    80-99  FL


 


Mean Corpuscular Hemoglobin 30    25-34  PG


 


Mean Corpuscular Hemoglobin


Concent 34 


 


 


 32-36  G/DL





 


Red Cell Distribution Width 13.7    10.0-14.5  %


 


Platelet Count


 456 H


 


 


 130-400


10^3/uL


 


Mean Platelet Volume 10.0    7.4-10.4  FL


 


Neutrophils (%) (Auto) 73    42-75  %


 


Lymphocytes (%) (Auto) 11 L   12-44  %


 


Monocytes (%) (Auto) 14 H   0-12  %


 


Eosinophils (%) (Auto) 3    0-10  %


 


Basophils (%) (Auto) 0    0-10  %


 


Neutrophils # (Auto) 7.7    1.8-7.8  X 10^3


 


Lymphocytes # (Auto) 1.1    1.0-4.0  X 10^3


 


Monocytes # (Auto) 1.5 H   0.0-1.0  X 10^3


 


Eosinophils # (Auto)


 0.3 


 


 


 0.0-0.3


10^3/uL


 


Basophils # (Auto)


 0.0 


 


 


 0.0-0.1


10^3/uL


 


Prothrombin Time 15.2 H   12.2-14.7  SEC


 


INR Comment 1.2    0.8-1.4  


 


Activated Partial


Thromboplast Time 39 H


 


 


 24-35  SEC





 


Sodium Level 137    135-145  MMOL/L


 


Potassium Level 4.5    3.6-5.0  MMOL/L


 


Chloride Level 104      MMOL/L


 


Carbon Dioxide Level 22    21-32  MMOL/L


 


Anion Gap 11    5-14  MMOL/L


 


Blood Urea Nitrogen 18    7-18  MG/DL


 


Creatinine


 0.66 


 


 


 0.60-1.30


MG/DL


 


Estimat Glomerular Filtration


Rate > 60 


 


 


  





 


BUN/Creatinine Ratio 27     


 


Glucose Level 123 H     MG/DL


 


Lactic Acid Level


 1.10 


 


 


 0.50-2.00


MMOL/L


 


Calcium Level 8.8    8.5-10.1  MG/DL


 


Corrected Calcium 9.5    8.5-10.1  MG/DL


 


Total Bilirubin 0.3    0.1-1.0  MG/DL


 


Aspartate Amino Transf


(AST/SGOT) 34 


 


 


 5-34  U/L





 


Alanine Aminotransferase


(ALT/SGPT) 24 


 


 


 0-55  U/L





 


Alkaline Phosphatase 141 H     U/L


 


Lactate Dehydrogenase 367 H   125-220  U/L


 


B-Type Natriuretic Peptide 18.4    <100.0  PG/ML


 


Total Protein 6.9    6.4-8.2  GM/DL


 


Albumin 3.1 L   3.2-4.5  GM/DL


 


Procalcitonin 0.05    <0.10  NG/ML


 


Urine Color  ORANGE    


 


Urine Clarity  CLEAR    


 


Urine pH  6.5   5-9  


 


Urine Specific Gravity  1.020   1.016-1.022  


 


Urine Protein  TRACE H  NEGATIVE  


 


Urine Glucose (UA)  NEGATIVE   NEGATIVE  


 


Urine Ketones  TRACE H  NEGATIVE  


 


Urine Nitrite  NEGATIVE   NEGATIVE  


 


Urine Bilirubin  NEGATIVE   NEGATIVE  


 


Urine Urobilinogen  4.0   < = 1.0  MG/DL


 


Urine Leukocyte Esterase  NEGATIVE   NEGATIVE  


 


Urine RBC (Auto)  NEGATIVE   NEGATIVE  


 


Urine RBC  NONE    /HPF


 


Urine WBC  RARE    /HPF


 


Urine Squamous Epithelial


Cells 


 NONE 


 


  /HPF





 


Urine Crystals  NONE    /LPF


 


Urine Bacteria  NEGATIVE    /HPF


 


Urine Casts  NONE    /LPF


 


Urine Mucus  SMALL H   /LPF


 


Urine Culture Indicated


 


 CULTURE


PENDING 


  











My Orders





Orders - CRYSTAL BEAUCHAMP APRPENNY


Cbc With Automated Diff (20 16:53)


Comprehensive Metabolic Panel (20 16:53)


Blood Culture (20 16:53)


Sputum Culture (20 16:53)


Urinalysis (20 16:53)


Urine Culture (20 16:53)


Protime With Inr (20 16:53)


Partial Thromboplastin Time (20 16:53)


Chest 1 View, Ap/Pa Only (20 16:53)


Ed Iv/Invasive Line Start (20 16:53)


Ed Iv/Invasive Line Start (20 16:53)


Vital Signs Adult Sepsis Patie Q15M (20 16:53)


O2 (20 16:53)


Remove Rings In Anticipation O (20 16:53)


Lactic Acid Analyzer (20 16:53)


Lactated Ringers (Lr 1000 Ml Iv Solution (20 16:53)


Coronavirus Sars-Cov-2 So 2019 (20 17:09)


Procalcitonin (Pct) (20 17:56)


Ct Angio Chest W (20 17:59)


Fentanyl  Injection (Sublimaze Injection (20 18:15)


Iohexol Injection (Omnipaque 350 Mg/Ml 1 (20 18:15)


Received Contrast (Hold Metformin- Contr (20 18:15)


Ns (Ivpb) (Sodium Chloride 0.9% Ivpb Bag (20 18:15)


Ceftriaxone  For Iv Use (Rocephin  For I (20 18:45)


Fentanyl  Injection (Sublimaze Injection (20 19:00)


Lidocaine/Epi 2% 1:100,000 (Xylocaine/Ep (20 19:00)


Cefepime Injection (Maxipime Injection) (20 19:00)


Body Fluid Ph (20 19:12)


Body Fluid Culture (20 19:12)


Body Fluid Cell Count (20 19:12)


Body Fluid Collection (20 19:12)


Ldh,Body Fluid (20 19:12)


Total Protein,Body Fluid (20 19:12)


Glucose,Body Fluid (20 19:12)


BNP (20 19:12)


LDH (20 19:12)


Lorazepam Injection (Ativan Injection) (20 19:45)


Chest 1 View, Ap/Pa Only (20 20:25)





Medications Given in ED





Current Medications








 Medications  Dose


 Ordered  Sig/Carlos


 Route  Start Time


 Stop Time Status Last Admin


Dose Admin


 


 Fentanyl Citrate  25 mcg  ONCE  PRN


 IVP  20 18:15


    20 18:18


25 MCG


 


 Iohexol  100 ml  ONCE  ONCE


 IV  20 18:15


 20 18:16 DC 20 18:39


74 ML


 


 Lactated Ringer's  1,000 ml @ 


 0 mls/hr  Q0M ONCE


 IV  20 16:53


 20 16:54 DC 20 17:14


0 MLS/HR


 


 Sodium Chloride  100 ml  ONCE  ONCE


 IV  20 18:15


 20 18:16 DC 20 18:39


80 ML








Vital Signs/I&O











 20





 16:45 16:57 18:20


 


Temp  37.3 


 


Pulse  96 


 


Resp  18 


 


B/P (MAP)  120/81 (94) 


 


Pulse Ox  93 98


 


O2 Delivery Room Air Room Air Nasal Cannula


 


O2 Flow Rate   2.00





Capillary Refill :





Diagnostic Imaging





   Diagonstic Imaging:  CT


Comments


NAME:   ROMULO HENDERSON


[facility] REC#:   [id number]


ACCOUNT#:   [id number]


PT STATUS:   REG ER


:   1960


PHYSICIAN:   CRYSTAL BEAUCHAMP APRN


ADMIT DATE:   20/ER


                                   ***Draft***


Date of Exam:20





CT ANGIO CHEST W








Clinical indication: Patient presents to ED via EMS for fever,


cough and generalized weakness.





Exam: CT angiogram of the chest performed with 74 cc Omnipaque


350 IV contrast. Coronal and oblique MIP images of the


vasculature were created to better evaluate anatomy. Auto


Exposure Controls were utilized during the CT exam to meet ALARA


standards for radiation dose reduction.





Comparison: Chest x-ray dated 2020. Chest x-ray dated


2018.





Findings: There is a large left pleural effusion with significant


compressive atelectasis involving the left lung. All of the left


lower lobe, middle lobe and a significant portion of the left


upper lobe is atelectatic and consolidated. There is associated


left right midline shift of the mediastinal structures due to


this pleural effusion. There is minimal high density layering in


the costophrenic angle region posteriorly and anteriorly. There


is no nodular pleural mass seen. There is no gross lung


parenchymal mass, mediastinal mass or hilar mass seen.





There is no significant axillary or mediastinal lymphadenopathy.





There is atelectasis involving the middle lobe and right lower


lobe region. There is minimal atelectasis/consolidation involving


the posterior aspect of the right lower lobe. There is no right


pleural effusion. There is no pneumothorax.





There is no thoracic aortic aneurysm or dissection.





There is dense contrast bolus within the superior vena cava which


causes streak artifact obscuring portions of the aortic arch and


pulmonary vasculature and mediastinal structures. There is also


breathing motion artifact limiting evaluation. The left and right


main pulmonary arteries are patent with no evidence of pulmonary


embolism. There is no gross evidence of pulmonary embolism


involving the proximal left segmental and subsegmental pulmonary


arteries. The left pulmonary arteries are limited regarding


evaluation due to atelectasis. There is significant motion and


streak artifact obscuring the right pulmonary arteries making


evaluation of the right pulmonary arteries distal to the right


main pulmonary artery uninterpretable.





Partially visualized 11 mm cyst involving the midportion of the


left kidney. Otherwise visualized upper abdominal structures are


unremarkable.





There are degenerative spurs involving the thoracic spine.





Impression:


1: There is a large left pleural effusion which causes left to


right mediastinal shift. There is a small amount of high density


material layering in the costophrenic angle regions anteriorly


and posteriorly. There is no measurable pleural mass seen.


Thoracentesis with fluid analysis would better evaluate.





2: There is no lung mass, mediastinal mass, or lymphadenopathy


seen on this exam.





3: There is near complete atelectasis/collapse of the left lung


with sparing of some of the left upper lobe.





4: There is no definite evidence of pulmonary embolism seen on


this exam. Of note, this exam is greatly limited due to


motion/breathing artifact and atelectasis in the left lung, as


described above. 





  Dictated on workstation # [id number]








Dict:   20


Trans:   20


[facility] [id number]





Interpreted by:     CARLOS MEDINA MD


Electronically signed by:





Departure


Communication (Admissions)


Time/Spoke to Admitting Phy:  19:08


I spoke with Dr. Gautam, we'll admit. I spoke with Dr. Sharma, he'll be down to 

do thoracentesis at the bedside. I did obtain phone consent from the patient's 

sister Oriana Talbot. Attempted to contact brother Reginaldo and brother Kevin 

but no answer.


-Dr Sharma here, has done left thoracentesis, 2500ml  dark tea colored 

pleural fluid obtained.





Impression





   Primary Impression:  


   Pleural effusion, left


Disposition:   ADMITTED AS INPATIENT


Condition:  Stable





Admissions


Decision to Admit Reason:  Admit from ER (General)


Decision to Admit/Date:  Aug 23, 2020


Time/Decision to Admit Time:  19:08





Departure-Patient Inst.


Referrals:  


CORNELIUS SHANE DO (PCP)


Primary Care Physician











CRYSTAL BEAUCHAMP             Aug 23, 2020 17:01

## 2020-08-23 NOTE — NUR
2015-3 PLEURX DRAINS CONTAINING A TOTAL OF 2500ML PLEURAL FLUID COLLECTED BY 
DR. NICHOLE WITH ASSIST OF GUILHERME RIVERA AND THIS WRITER/RN DURING 
THORACENTESIS AND TAKEN TO LAB BY GUILHERME RIVERA.

## 2020-08-23 NOTE — DIAGNOSTIC IMAGING REPORT
Clinical indication: Patient presents to ED via EMS for fever,

cough and generalized weakness.



Exam: CT angiogram of the chest performed with 74 cc Omnipaque

350 IV contrast. Coronal and oblique MIP images of the

vasculature were created to better evaluate anatomy. Auto

Exposure Controls were utilized during the CT exam to meet ALARA

standards for radiation dose reduction.



Comparison: Chest x-ray dated 08/23/2020. Chest x-ray dated

02/13/2018.



Findings: There is a large left pleural effusion with significant

compressive atelectasis involving the left lung. All of the left

lower lobe, middle lobe and a significant portion of the left

upper lobe is atelectatic and consolidated. There is associated

left right midline shift of the mediastinal structures due to

this pleural effusion. There is minimal high density layering in

the costophrenic angle region posteriorly and anteriorly. There

is no nodular pleural mass seen. There is no gross lung

parenchymal mass, mediastinal mass or hilar mass seen.



There is no significant axillary or mediastinal lymphadenopathy.



There is atelectasis involving the middle lobe and right lower

lobe region. There is minimal atelectasis/consolidation involving

the posterior aspect of the right lower lobe. There is no right

pleural effusion. There is no pneumothorax.



There is no thoracic aortic aneurysm or dissection.



There is dense contrast bolus within the superior vena cava which

causes streak artifact obscuring portions of the aortic arch and

pulmonary vasculature and mediastinal structures. There is also

breathing motion artifact limiting evaluation. The left and right

main pulmonary arteries are patent with no evidence of pulmonary

embolism. There is no gross evidence of pulmonary embolism

involving the proximal left segmental and subsegmental pulmonary

arteries. The left pulmonary arteries are limited regarding

evaluation due to atelectasis. There is significant motion and

streak artifact obscuring the right pulmonary arteries making

evaluation of the right pulmonary arteries distal to the right

main pulmonary artery uninterpretable.



Partially visualized 11 mm cyst involving the midportion of the

left kidney. Otherwise visualized upper abdominal structures are

unremarkable.



There are degenerative spurs involving the thoracic spine.



Impression:

1: There is a large left pleural effusion which causes left to

right mediastinal shift. There is a small amount of high density

material layering in the costophrenic angle regions anteriorly

and posteriorly. There is no measurable pleural mass seen.

Thoracentesis with fluid analysis would better evaluate.



2: There is no lung mass, mediastinal mass, or lymphadenopathy

seen on this exam.



3: There is near complete atelectasis/collapse of the left lung

with sparing of some of the left upper lobe.



4: There is no definite evidence of pulmonary embolism seen on

this exam. Of note, this exam is greatly limited due to

motion/breathing artifact and atelectasis in the left lung, as

described above. 



Dictated by: 



  Dictated on workstation # [id number]

## 2020-08-24 VITALS — DIASTOLIC BLOOD PRESSURE: 55 MMHG | SYSTOLIC BLOOD PRESSURE: 92 MMHG

## 2020-08-24 VITALS — DIASTOLIC BLOOD PRESSURE: 76 MMHG | SYSTOLIC BLOOD PRESSURE: 107 MMHG

## 2020-08-24 VITALS — DIASTOLIC BLOOD PRESSURE: 57 MMHG | SYSTOLIC BLOOD PRESSURE: 90 MMHG

## 2020-08-24 VITALS — DIASTOLIC BLOOD PRESSURE: 61 MMHG | SYSTOLIC BLOOD PRESSURE: 100 MMHG

## 2020-08-24 VITALS — DIASTOLIC BLOOD PRESSURE: 72 MMHG | SYSTOLIC BLOOD PRESSURE: 116 MMHG

## 2020-08-24 VITALS — SYSTOLIC BLOOD PRESSURE: 105 MMHG | DIASTOLIC BLOOD PRESSURE: 74 MMHG

## 2020-08-24 VITALS — DIASTOLIC BLOOD PRESSURE: 69 MMHG | SYSTOLIC BLOOD PRESSURE: 97 MMHG

## 2020-08-24 VITALS — SYSTOLIC BLOOD PRESSURE: 118 MMHG | DIASTOLIC BLOOD PRESSURE: 79 MMHG

## 2020-08-24 LAB
ALBUMIN SERPL-MCNC: 2.4 GM/DL (ref 3.2–4.5)
ALP SERPL-CCNC: 107 U/L (ref 40–136)
ALT SERPL-CCNC: 50 U/L (ref 0–55)
BASOPHILS # BLD AUTO: 0 10^3/UL (ref 0–0.1)
BASOPHILS NFR BLD AUTO: 0 % (ref 0–10)
BILIRUB SERPL-MCNC: 0.3 MG/DL (ref 0.1–1)
BUN/CREAT SERPL: 33
CALCIUM SERPL-MCNC: 8 MG/DL (ref 8.5–10.1)
CHLORIDE SERPL-SCNC: 105 MMOL/L (ref 98–107)
CO2 SERPL-SCNC: 23 MMOL/L (ref 21–32)
CREAT SERPL-MCNC: 0.55 MG/DL (ref 0.6–1.3)
EOSINOPHIL # BLD AUTO: 0.2 10^3/UL (ref 0–0.3)
EOSINOPHIL NFR BLD AUTO: 2 % (ref 0–10)
ERYTHROCYTE [DISTWIDTH] IN BLOOD BY AUTOMATED COUNT: 13.5 % (ref 10–14.5)
GFR SERPLBLD BASED ON 1.73 SQ M-ARVRAT: > 60 ML/MIN
GLUCOSE SERPL-MCNC: 114 MG/DL (ref 70–105)
HCT VFR BLD CALC: 30 % (ref 40–54)
HGB BLD-MCNC: 10 G/DL (ref 13.3–17.7)
LYMPHOCYTES # BLD AUTO: 0.9 X 10^3 (ref 1–4)
LYMPHOCYTES NFR BLD AUTO: 11 % (ref 12–44)
MANUAL DIFFERENTIAL PERFORMED BLD QL: NO
MCH RBC QN AUTO: 30 PG (ref 25–34)
MCHC RBC AUTO-ENTMCNC: 33 G/DL (ref 32–36)
MCV RBC AUTO: 91 FL (ref 80–99)
MONOCYTES # BLD AUTO: 1.3 X 10^3 (ref 0–1)
MONOCYTES NFR BLD AUTO: 15 % (ref 0–12)
NEUTROPHILS # BLD AUTO: 6.1 X 10^3 (ref 1.8–7.8)
NEUTROPHILS NFR BLD AUTO: 72 % (ref 42–75)
PLATELET # BLD: 380 10^3/UL (ref 130–400)
PMV BLD AUTO: 9.9 FL (ref 7.4–10.4)
POTASSIUM SERPL-SCNC: 3.9 MMOL/L (ref 3.6–5)
PROT SERPL-MCNC: 5.4 GM/DL (ref 6.4–8.2)
SODIUM SERPL-SCNC: 136 MMOL/L (ref 135–145)
WBC # BLD AUTO: 8.4 10^3/UL (ref 4.3–11)

## 2020-08-24 RX ADMIN — MUPIROCIN SCH GM: 20 OINTMENT TOPICAL at 20:08

## 2020-08-24 RX ADMIN — SODIUM CHLORIDE SCH MLS/HR: 900 INJECTION INTRAVENOUS at 12:59

## 2020-08-24 RX ADMIN — DIVALPROEX SODIUM SCH MG: 125 CAPSULE ORAL at 12:58

## 2020-08-24 RX ADMIN — SODIUM CHLORIDE, SODIUM LACTATE, POTASSIUM CHLORIDE, AND CALCIUM CHLORIDE SCH MLS/HR: 600; 310; 30; 20 INJECTION, SOLUTION INTRAVENOUS at 05:50

## 2020-08-24 RX ADMIN — CARBIDOPA AND LEVODOPA SCH EA: 25; 100 TABLET ORAL at 20:07

## 2020-08-24 RX ADMIN — CARBIDOPA AND LEVODOPA SCH EA: 25; 100 TABLET ORAL at 12:58

## 2020-08-24 RX ADMIN — SODIUM CHLORIDE SCH MLS/HR: 900 INJECTION INTRAVENOUS at 05:47

## 2020-08-24 RX ADMIN — SODIUM CHLORIDE, SODIUM LACTATE, POTASSIUM CHLORIDE, AND CALCIUM CHLORIDE SCH MLS/HR: 600; 310; 30; 20 INJECTION, SOLUTION INTRAVENOUS at 12:59

## 2020-08-24 RX ADMIN — DIVALPROEX SODIUM SCH MG: 125 CAPSULE ORAL at 20:07

## 2020-08-24 RX ADMIN — SODIUM CHLORIDE SCH MLS/HR: 900 INJECTION INTRAVENOUS at 18:01

## 2020-08-24 NOTE — OPERATIVE REPORT
DATE OF SERVICE:  08/23/2020



PREOPERATIVE DIAGNOSIS:

Left pleural effusion.



POSTOPERATIVE DIAGNOSIS:

Left pleural effusion.



PROCEDURE:

Ultrasound-guided left thoracentesis.



SURGEON:

Oj Sharma DO.



ANESTHESIA:

A 1% lidocaine.



ESTIMATED BLOOD LOSS:

Minimal.



COMPLICATIONS:

None.



INDICATIONS:

The patient is a 59-year-old male with large left pleural effusion that is

symptomatic.  He understands risks and benefits of procedure.  Consent was

obtained for procedure.



DESCRIPTION OF PROCEDURE:

The patient was placed in the supine position with the left arm elevated above

his head.  The ultrasound was used to isolate the largest pocket to be

visualized, left mid axillary line approximately nipple line level was the best

pocket visualized, the area was then prepped and draped and local anesthetic was

infiltrated.  An 11 blade scalpel was used to make a small skin incision and the

Safe-T-Centesis needle and catheter were advanced through the chest wall until

straw-colored fluid was withdrawn and the catheter was inserted.  The needle was

removed.  A total of 2500 mL of straw-colored fluid was able to be withdrawn and

once removed, the catheter was then removed and sterile bandage was applied. 

The patient tolerated procedure well without any complications.  Chest x-ray

pending.





Job ID: [id number]

DocumentID: [id number]

Dictated Date:  08/23/2020 21:26:28

Transcription Date: 08/24/2020 01:11:19

Dictated By: OJ SHARMA DO

## 2020-08-24 NOTE — CONSULTATION - SURGERY
History of Present Illness


History of Present Illness


Patient Consulted On(ministerio/time)


20


 20:18


Date Seen by Provider:  Aug 23, 2020


Time Seen by Provider:  20:18


History of Present Illness


Consult requested by Janes Montanez for left pleural effusion.





Seen and evaluated in ED.





Patient not able to provide any history has advanced dementia.  Patient in 

nursing home. Reported to have cough and fever.  He had chest x ray and ct scan 

that found to have large left pleural effusion.  


No care providers present with him.





Allergies and Home Medications


Allergies


Coded Allergies:  


     No Known Drug Allergies (Unverified , 18)





Home Medications


Acetaminophen 325 Mg Tablet, 650 MG PO Q4H PRN for PAIN-MILD, (Reported)


   TAKES 2 (325 MG) TABLETS 


Bisacodyl 10 Mg Supp.rect, 10 MG RC DAILY PRN for CONSTIPATION-4TH LINE, 

(Reported)


   USE IF NO BM IN 3 DAYS IF MOM NOT EFFECTIVE 


Carbidopa/Levodopa 1 Each Tablet, 1 TAB PO TID, (Reported)


Diltiazem HCl 120 Mg Tablet, 120 MG PO DAILY, (Reported)


Divalproex Sodium 125 Mg Cap.sprink, 250 MG PO BID, (Reported)


   TAKES 2 (125MG) CAPS 


Divalproex Sodium 125 Mg Cap, 125 MG PO 1400, (Reported)


Docusate Sodium 100 Mg Capsule, 100 MG PO DAILY, (Reported)


Entacapone 200 Mg Tablet, 200 MG PO BID, (Reported)


Loperamide HCl 2 Mg Tablet, 2-4 MG PO TID PRN for DIARRHEA, (Reported)


   GIVE 2 TABS AS NEEDED AFTER LOOSE STOOL, THEN 1 TAB AFTER EACH LOOSE STOOL 


Magnesium Hydroxide 2,400 Mg/10 Ml Oral.susp, 30 ML PO DAILY PRN for 

CONSTIPATION-7TH LINE, (Reported)


Mupirocin 22 Gm Oint...g., 1 APPLIC TP BID, (Reported)


   APPLY TO THE RIGHT GREAT TOE AND WRAP WITH GAUZE DRESSING 


Na Phos,M-B/Na Phos,Di-Ba 133 Ml Enema, 133 ML RC DAILY PRN for CONSTIPATION-8TH

LINE, (Reported)


   GIVE IF NO BM IN 5 DAYS 


Paroxetine HCl 20 Mg Tablet, 20 MG PO DAILY, (Reported)


Risperidone 0.5 Mg Tablet, 0.5 MG PO DAILY, (Reported)





Patient Home Medication List


Home Medication List Reviewed:  Yes





Past Medical-Social-Family Hx


Patient Social History


Alcohol Use:  Denies Use


Recreational Drug Use:  No


Smoking Status:  Unknown if Ever Smoked


Recent Foreign Travel:  No


Contact w/Someone Who Travel:  No


Recent Infectious Disease Expo:  No


Recent Hopitalizations:  No (unknown)





Seasonal Allergies


Seasonal Allergies:  No





Respiratory


History of Respiratory Disorde:  No





Cardiovascular


History of Cardiac Disorders:  No





Neurological


History of Neurological Disord:  Yes


Neurological Disorders:  Dementia, Parkinson's Disease





Gastrointestinal


History of Gastrointestinal Di:  Yes


Gastrointestinal Disorders:  Chronic Constipation





Musculoskeletal


History of Musculoskeletal Dis:  No





Psychosocial


History of Psychiatric Problem:  Yes (MANIPULATIVE BEHAVIORS, SEXUAL 

INAPROPRIATENESS)


Behavioral Health Disorders:  Schizophrenia, Depression





Blood Transfusions


History of Blood Disorders:  No


Adverse Reaction to a Blood Tr:  No





Reviewed Nursing Assessment


Reviewed/Agree w Nursing PMH:  Yes





Family Medical History


Significant Family History:  No Pertinent Family Hx





Review of Systems-General


ROS-Unable to Obtain:  patient unable to provide due to medical condition





Physical Exam-General Problems


Physical Exam


Vital Signs





Vital Signs - First Documented








 20





 16:45 16:57 18:20


 


Temp  37.3 


 


Pulse  96 


 


Resp  18 


 


B/P (MAP)  120/81 (94) 


 


Pulse Ox  93 


 


O2 Delivery Room Air  


 


O2 Flow Rate   2.00





Capillary Refill : Less Than 3 Seconds


General Appearance:  mild distress


HEENT:  PERRL/EOMI, normal ENT inspection


Neck:  non-tender, supple


Respiratory:  chest non-tender, other (slightly labored breathing)


Cardiovascular:  regular rate, rhythm, no edema


Gastrointestinal:  non tender, soft


Rectal:  deferred


Back:  normal inspection, no CVA tenderness


Extremities:  non-tender, normal inspection, other (some rigidity in range of 

motion)


Neurologic/Psychiatric:  alert


Skin:  normal color, warm/dry


Lymphatic:  no adenopathy





Data Review


Labs


Laboratory Tests


20 16:54: 


White Blood Count 10.5, Red Blood Count 3.90L, Hemoglobin 11.8L, Hematocrit 35L,

Mean Corpuscular Volume 90, Mean Corpuscular Hemoglobin 30, Mean Corpuscular 

Hemoglobin Concent 34, Red Cell Distribution Width 13.7, Platelet Count 456H, 

Mean Platelet Volume 10.0, Neutrophils (%) (Auto) 73, Lymphocytes (%) (Auto) 11L

, Monocytes (%) (Auto) 14H, Eosinophils (%) (Auto) 3, Basophils (%) (Auto) 0, 

Neutrophils # (Auto) 7.7, Lymphocytes # (Auto) 1.1, Monocytes # (Auto) 1.5H, 

Eosinophils # (Auto) 0.3, Basophils # (Auto) 0.0, Prothrombin Time 15.2H, INR 

Comment 1.2, Activated Partial Thromboplast Time 39H, Sodium Level 137, 

Potassium Level 4.5, Chloride Level 104, Carbon Dioxide Level 22, Anion Gap 11, 

Blood Urea Nitrogen 18, Creatinine 0.66, Estimat Glomerular Filtration Rate > 

60, BUN/Creatinine Ratio 27, Glucose Level 123H, Lactic Acid Level 1.10, Calcium

Level 8.8, Corrected Calcium 9.5, Total Bilirubin 0.3, Aspartate Amino Transf 

(AST/SGOT) 34, Alanine Aminotransferase (ALT/SGPT) 24, Alkaline Phosphatase 141H

, Lactate Dehydrogenase 367H, B-Type Natriuretic Peptide 18.4, Total Protein 

6.9, Albumin 3.1L, Procalcitonin 0.05


20 17:22: 


Urine Color ORANGE, Urine Clarity CLEAR, Urine pH 6.5, Urine Specific Gravity 

1.020, Urine Protein TRACEH, Urine Glucose (UA) NEGATIVE, Urine Ketones TRACEH, 

Urine Nitrite NEGATIVE, Urine Bilirubin NEGATIVE, Urine Urobilinogen 4.0, Urine 

Leukocyte Esterase NEGATIVE, Urine RBC (Auto) NEGATIVE, Urine RBC NONE, Urine 

WBC RARE, Urine Squamous Epithelial Cells NONE, Urine Crystals NONE, Urine 

Bacteria NEGATIVE, Urine Casts NONE, Urine Mucus SMALLH, Urine Culture Indicated

CULTURE PENDING


20 20:15: 


Body Fluid Source THORACENTESIS, Body Fluid Color YELLOW, Body Fluid Appearance 

CLEAR, Body Fluid pH 8.0, Body Fluid , Body Fluid RBC 2750, Body Fluid 

Polynuclear WBCs 52, Body Fluid Mononuclear WBCs 14, Body Fluid Lymphocytes 34, 

Body Fluid Other Cells NA, Body Fluid Glucose 106, Body Fluid Total Protein 4.6,

Body Fluid Lactate Dehydrogenase 338


20 03:30: 


White Blood Count 8.4, Red Blood Count 3.32L, Hemoglobin 10.0L, Hematocrit 30L, 

Mean Corpuscular Volume 91, Mean Corpuscular Hemoglobin 30, Mean Corpuscular 

Hemoglobin Concent 33, Red Cell Distribution Width 13.5, Platelet Count 380, 

Mean Platelet Volume 9.9, Neutrophils (%) (Auto) 72, Lymphocytes (%) (Auto) 11L,

Monocytes (%) (Auto) 15H, Eosinophils (%) (Auto) 2, Basophils (%) (Auto) 0, 

Neutrophils # (Auto) 6.1, Lymphocytes # (Auto) 0.9L, Monocytes # (Auto) 1.3H, 

Eosinophils # (Auto) 0.2, Basophils # (Auto) 0.0, Sodium Level 136, Potassium 

Level 3.9, Chloride Level 105, Carbon Dioxide Level 23, Anion Gap 8, Blood Urea 

Nitrogen 18, Creatinine 0.55L, Estimat Glomerular Filtration Rate > 60, 

BUN/Creatinine Ratio 33, Glucose Level 114H, Calcium Level 8.0L, Corrected 

Calcium 9.3, Total Bilirubin 0.3, Aspartate Amino Transf (AST/SGOT) 17, Alanine 

Aminotransferase (ALT/SGPT) 50, Alkaline Phosphatase 107, Total Protein 5.4L, 

Albumin 2.4L





Assessment/Plan


cough


fever


large left pleural effusion








needs left u/s guided thoracentesis


will do at bedside


consent was obtained


will send for analysis and cytology


admitted to medicine, will sign off, if needed please call





Clinical Quality Measures


DVT/VTE Risk/Contraindication:


Risk Factor Score Per Nursin


RFS Level Per Nursing on Admit:  4+=Very High











OJ NICHOLE DO              Aug 24, 2020 10:23

## 2020-08-24 NOTE — HISTORY & PHYSICAL-HOSPITALIST
History of Present Illness


HPI/Chief Complaint


Pt is a 59yoCM with a PMH of advanced dementia who presented to the ER due to 

cough and fever. He is unable to provide me any history. He was able to say "I'm

working on it." when asked how he was doing but otherwise only mumbled or did 

not respond to verbal cues. All history is obtained from the chart. Apparently 

he lives at [facility] and [facility] and was noticed to have a cough and fever. 

I am unsure for how long or how high of a fever. He was swabbed for COVID in the

ER but has not had any exposures or cases at [facility]. He was found to have a large 

left pleural effusion and was admitted for PNA. Thoracentesis was done in the ER

with 2500mL of fluid drained.


Exam Limitations:  clinical condition


Date Seen


20


Time Seen by a Provider:  08:30


Attending Physician


Di Gautam MD


PCP


Ric Putnam DO


Referring Physician





Date of Admission


Aug 23, 2020 at 18:51





Home Medications & Allergies


Home Medications


Reviewed patient Home Medication Reconciliation performed by pharmacy medication

reconciliations technician and/or nursing.


Patients Allergies have been reviewed.





Allergies





Allergies


Coded Allergies


  No Known Drug Allergies (Unverified18)








Past Medical-Social-Family Hx


Past Med/Social Hx:  Reviewed Nursing Past Med/Soc Hx


Patient Social History


Employed/Student:  unemployed


Alcohol Use:  Denies Use


Recreational Drug Use:  No


Smoking Status:  Unknown if Ever Smoked


Recent Foreign Travel:  No


Contact w/other who traveled:  No


Recent Hopitalizations:  No (unknown)


Recent Infectious Disease Expo:  No





Seasonal Allergies


Seasonal Allergies:  No





Past Medical History


Neurological:  Dementia, Parkinson's Disease


Gastrointestinal:  Chronic Constipation


Psychosocial:  Schizophrenia, Depression


History of Blood Disorders:  No


Adverse Reaction to Blood [place]:  No





Family History


Reviewed Nursing Family Hx


No Pertinent Family Hx





Review of Systems


ROS-Unable to Obtain:  limited by dementia


Constitutional:  fever


Respiratory:  cough





Physical Exam


Physical Exam


Vital Signs





Vital Signs - First Documented








 20





 16:45 16:57 18:20


 


Temp  37.3 


 


Pulse  96 


 


Resp  18 


 


B/P (MAP)  120/81 (94) 


 


Pulse Ox  93 


 


O2 Delivery Room Air  


 


O2 Flow Rate   2.00





Capillary Refill : Less Than 3 Seconds


Height, Weight, BMI


Height: 5'8.00"


Weight: 183lbs. 3.0oz. 83.006064yi; 23.88 BMI


Method:Estimated


General Appearance:  No Apparent Distress, Chronically ill


HEENT:  PERRL/EOMI, Moist Mucous Membranes; No Scleral Icterus (L), No Scleral 

Icterus (R)


Neck:  Normal Inspection, Supple


Respiratory:  No Accessory Muscle Use, No Respiratory Distress


Cardiovascular:  Regular Rate, Rhythm, No Murmur


Gastrointestinal:  Normal Bowel Sounds, Non Tender, Soft


Extremity:  No Calf Tenderness, No Pedal Edema, Other (right great toe with 

dressing on it, no underlying wound)


Neurologic/Psychiatric:  Disoriented, Other (arouses to verbal stimuli, answered

one question appropriately)


Skin:  Normal Color, Warm/Dry; No Mottled





Results


Results/Procedures


Labs


Laboratory Tests


20 04:40








Patient resulted labs reviewed.


Imaging:  Reviewed Imaging Report


Imaging


                 [facility] [place], [place]





NAME:   ROMULO HENDERSON


MED REC#:   [id number]


ACCOUNT#:   [id number]


PT STATUS:   ADM IN


:   1960


PHYSICIAN:   CRYSTAL BEAUCHAMP


ADMIT DATE:   20/ICU


                                  ***Signed***


Date of Exam:20





CT ANGIO CHEST W








Clinical indication: Patient presents to ED via EMS for fever,


cough and generalized weakness.





Exam: CT angiogram of the chest performed with 74 cc Omnipaque


350 IV contrast. Coronal and oblique MIP images of the


vasculature were created to better evaluate anatomy. Auto


Exposure Controls were utilized during the CT exam to meet ALARA


standards for radiation dose reduction.





Comparison: Chest x-ray dated 2020. Chest x-ray dated


2018.





Findings: There is a large left pleural effusion with significant


compressive atelectasis involving the left lung. All of the left


lower lobe, middle lobe and a significant portion of the left


upper lobe is atelectatic and consolidated. There is associated


left right midline shift of the mediastinal structures due to


this pleural effusion. There is minimal high density layering in


the costophrenic angle region posteriorly and anteriorly. There


is no nodular pleural mass seen. There is no gross lung


parenchymal mass, mediastinal mass or hilar mass seen.





There is no significant axillary or mediastinal lymphadenopathy.





There is atelectasis involving the middle lobe and right lower


lobe region. There is minimal atelectasis/consolidation involving


the posterior aspect of the right lower lobe. There is no right


pleural effusion. There is no pneumothorax.





There is no thoracic aortic aneurysm or dissection.





There is dense contrast bolus within the superior vena cava which


causes streak artifact obscuring portions of the aortic arch and


pulmonary vasculature and mediastinal structures. There is also


breathing motion artifact limiting evaluation. The left and right


main pulmonary arteries are patent with no evidence of pulmonary


embolism. There is no gross evidence of pulmonary embolism


involving the proximal left segmental and subsegmental pulmonary


arteries. The left pulmonary arteries are limited regarding


evaluation due to atelectasis. There is significant motion and


streak artifact obscuring the right pulmonary arteries making


evaluation of the right pulmonary arteries distal to the right


main pulmonary artery uninterpretable.





Partially visualized 11 mm cyst involving the midportion of the


left kidney. Otherwise visualized upper abdominal structures are


unremarkable.





There are degenerative spurs involving the thoracic spine.





Impression:


1: There is a large left pleural effusion which causes left to


right mediastinal shift. There is a small amount of high density


material layering in the costophrenic angle regions anteriorly


and posteriorly. There is no measurable pleural mass seen.


Thoracentesis with fluid analysis would better evaluate.





2: There is no lung mass, mediastinal mass, or lymphadenopathy


seen on this exam.





3: There is near complete atelectasis/collapse of the left lung


with sparing of some of the left upper lobe.





4: There is no definite evidence of pulmonary embolism seen on


this exam. Of note, this exam is greatly limited due to


motion/breathing artifact and atelectasis in the left lung, as


described above. 





Dictated by: 





  Dictated on workstation # [id number]








Dict:   [id number]


Trans:   [id number]


[facility] [id number]





Interpreted by:     CARLOS MEDINA MD


Electronically signed by: CARLOS MEDINA MD [id number]





Assessment/Plan


Admission Diagnosis


Pneumonia with pleural effusion


Admission Status:  Inpatient Order (span 2 midnights)


Reason for Inpatient Admission:  


see below





Assessment and Plan


Pneumonia


Pleural Effusion


   Not sepsis


   s/p thoracentesis in ER with 2500mL


      - fluid consistent with exudative effusion


   Await cultures from fluid


   Continue abx


   COVID pending





Parkinson's Dementia


Dysphagia


Seizure disorder


   Rn reports difficulty swallowing


   SLP consulted placed


   Attempted to call family to address baseline mentation and goals of care, 

awaiting response


   Continue home meds after swallow eval





HTN


   Continue home meds





Clinical Quality Measures


DVT/VTE Risk/Contraindication:


Risk Factor Score Per Nursin


RFS Level Per Nursing on Admit:  4+=Very High











DEON CALL MD              Aug 24, 2020 09:48

## 2020-08-24 NOTE — NUR
MED REC WAS ENTERED [facility]

## 2020-08-24 NOTE — ST DYSPHAGIA EVALUATION
Speech Evaluation-General


Medical Diagnosis


Pneumonia


Onset Date:  Aug 24, 2020





Therapy Diagnosis


Therapy Diagnosis:  Oropharyngeal Dysphagia





Precautions


Precautions:  Aspiration





Referral


Referring Physician:  Dr. Pathak





Medical History


Pertinent Medical History:  Dementia


Reviewed History:  Yes





Social History


Home:  Nursing Home





Speech PLF/Current-Dysphagia


Prior Level of Function


Patient is a nursing home resident where he receives assistance with all of his 

daily needs.





Subjective


Patient was cooperative with the Bedside Dysphagia Evaluation.





Cognitive Status


Patient Orientation:  Unable to Assess, Non-Verbal/Aphasic


Patient has advanced stage dementia.





Oral Motor Skills


Dentition:  Natural, Tumbled


Current Food Consistancy:  Mechanical Soft, Thin Liquids


Ability to Follow Directions:  Fair


Oral Expression Ability:  Severe Impairment





Face


Facial Symmetry:  Symmetrical





Oral-Facial Assessment


Oral-Facial Dentition:  Normal


Labial Seal Description:  Normal


Lingual Protrusion:  Normal


Lingual ROM:  Normal


Lingual Strength:  Normal


Pharynx Velopharyngeal Move.:  Normal


Volitional Dry Swallow:  Yes





Dysphagia Evaluation


Consistencies Presented:  Regular, Thin Liquid, Mechanical Soft, Ground, Pureed


Oral Phase:  Reduced Oral Transit


Reduced A to P transfer for regular texture only


Pharyngeal Phase:  Delayed Swallow


Delayed swallow with regular texture only.


Dietary Recommendations:  Mechanical Soft


Liquid Recommendations:  Thin


Swallowing Precautions:  Alternate Liquids/Solids, Decreased Bolus 1/2 Tsp, 

Liquids from Straw, Liquids from Spoon, Small Bites and Sips, Sitting Upright 90

Degrees, Sitting 90 Degrees 30 Post Intake


Dysphagia Evaluation Summary


Patient was evaluated at bedside per physician order. Patient has advanced stage

dementia and was non verbal outside of a few mumbles noted throughout the 

evaluation. Patient was placed on a Dysphagia II diet level prior to evaluation.

Patient's breakfast was sitting in his room, untouched when I entered his room. 

I assisted him with intake of the meal without difficulty noted. He was also 

presented a trial of regular texture with decreased A to P transfer as well as 

delay in swallow noted. He tolerated the Dysphagia II diet level with thin 

without s/s of aspiration note. Patient utilized the straw without cues. Patient

is recommended to continue on the Dysphagia II diet level with thin liquids.





Speech-Plan


Patient/Family Goals


Patient/Family Goals:  


Patient will return to the NH upon discharge.





Treatment Plan


Speech Therapy Treatment Plan:  Discontinue ST


Treatment Duration:  Aug 24, 2020


Frequency:  1 time per week


Estimated Hrs Per Day:  .5 hour per day


Rehab Potential:  Poor


Barriers to Learning:  


Patient has advanced stage dementia


Pt/Family Agrees to Plan:  Yes





Safety Risks/Education


Teaching Recipient:  Patient


Teaching Methods:  Discussion


Response to Teaching:  Reinforcement Needed


Education Topics Provided:  


Diet level and safety strategies for intake





Time


Speech Therapy Time In:  10:30


Speech Therapy Time Out:  11:00


Total Billed Time:  30


Billed Treatment Time


1RUDI DYST No WHORTON, BETHANIA ST            Aug 24, 2020 13:52

## 2020-08-24 NOTE — DIAGNOSTIC IMAGING REPORT
CT CHEST W



TECHNIQUE: Multiple contiguous axial images were obtained through

the chest with the use of intravenous contrast. All CT scans use

one or more of the following dose optimizing techniques:

automated exposure control, MA and/or KvP adjustment based on a

patient size and exam type, or iterative reconstruction. 



INDICATION: Postthoracentesis. Shortness of breath.



COMPARISON: CTA chest of 08/23/2020



FINDINGS:



Lungs and airway: There remains slightly improved aeration within

the left lung, although the entirety of the left lower lobe and

portion of the lingula remain atelectatic due to the persistent

large left pleural effusion. Respiratory motion artifact limits

assessment of fine detail. Subpleural opacities on the right are

unchanged and likely due to atelectasis.



Pleura: Decreased size of the persistent large left pleural

effusion. The degree of mass effect on the left hemithorax due to

pleural effusion has decreased. No right pleural effusion. No

pneumothorax.



Heart and mediastinum: No supraclavicular or axillary

lymphadenopathy. No appreciable mediastinal or juxtaphrenic

lymphadenopathy. Compressive atelectasis in the left hilar region

limits assessment for hilar lymphadenopathy. Unchanged bore line

cardiomegaly with trace pericardial fluid. Normal caliber

thoracic aorta without dissection.



Upper abdomen: Stable hypodense lesion in the left kidney which

meets criteria for simple cyst requiring no dedicated followup

imaging.



Musculoskeletal: No worrisome focal osseous lesions, allowing for

motion artifact.



IMPRESSION:

1. While the left-sided pleural effusion has decreased in size,

there still remains a large volume of left pleural fluid

resulting in complete atelectasis left lower lobe and portions of

the lingula. The atelectatic lung enhances suggesting that there

is no underlying mass lesion, although a small pulmonary mass

cannot be entirely excluded on this exam due to the effusion.

2. No appreciable lymphadenopathy.



Dictated by: 



  Dictated on workstation # [id number]

## 2020-08-24 NOTE — PULMONARY CONSULTATION
History of Present Illness


History of Present Illness


Date Seen by Provider:  Aug 24, 2020


Time Seen by Provider:  11:14


Date of Admission








Allergies and Home Medications


Allergies


Coded Allergies:  


     No Known Drug Allergies (Unverified , 2/9/18)





Home Medications


Acetaminophen 325 Mg Tablet, 650 MG PO Q4H PRN for PAIN-MILD, (Reported)


   TAKES 2 (325 MG) TABLETS 


Bisacodyl 10 Mg Supp.rect, 10 MG RC DAILY PRN for CONSTIPATION-4TH LINE, 

(Reported)


   USE IF NO BM IN 3 DAYS IF MOM NOT EFFECTIVE 


Carbidopa/Levodopa 1 Each Tablet, 1 TAB PO TID, (Reported)


Diltiazem HCl 120 Mg Tablet, 120 MG PO DAILY, (Reported)


Divalproex Sodium 125 Mg Cap.sprink, 250 MG PO BID, (Reported)


   TAKES 2 (125MG) CAPS 


Divalproex Sodium 125 Mg Cap, 125 MG PO 1400, (Reported)


Docusate Sodium 100 Mg Capsule, 100 MG PO DAILY, (Reported)


Entacapone 200 Mg Tablet, 200 MG PO BID, (Reported)


Loperamide HCl 2 Mg Tablet, 2-4 MG PO TID PRN for DIARRHEA, (Reported)


   GIVE 2 TABS AS NEEDED AFTER LOOSE STOOL, THEN 1 TAB AFTER EACH LOOSE STOOL 


Magnesium Hydroxide 2,400 Mg/10 Ml Oral.susp, 30 ML PO DAILY PRN for 

CONSTIPATION-7TH LINE, (Reported)


Mupirocin 22 Gm Oint...g., 1 APPLIC TP BID, (Reported)


   APPLY TO THE RIGHT GREAT TOE AND WRAP WITH GAUZE DRESSING 


Na Phos,M-B/Na Phos,Di-Ba 133 Ml Enema, 133 ML RC DAILY PRN for CONSTIPATION-8TH

LINE, (Reported)


   GIVE IF NO BM IN 5 DAYS 


Paroxetine HCl 20 Mg Tablet, 20 MG PO DAILY, (Reported)


Risperidone 0.5 Mg Tablet, 0.5 MG PO DAILY, (Reported)





Past Medical-Social-Family Hx


Patient Social History


Alcohol Use:  Denies Use


Recreational Drug Use:  No


Smoking Status:  Unknown if Ever Smoked


Recent Foreign Travel:  No


Contact w/Someone Who Travel:  No


Recent Infectious Disease Expo:  No


Recent Hopitalizations:  No (unknown)





Seasonal Allergies


Seasonal Allergies:  No





Past Medical History


Respiratory:  No


Cardiac:  No


Neurological:  Yes


Dementia, Parkinson's Disease


Gastrointestinal:  Yes


Chronic Constipation


Musculoskeletal:  No


Psychosocial:  Yes (MANIPULATIVE BEHAVIORS, SEXUAL INAPROPRIATENESS)


Schizophrenia, Depression


Blood Disorders:  No


Adverse Reaction/Blood Tranf:  No





Family Medical History


No Pertinent Family Hx





Review of Systems


Time Seen by Provider:  11:15





Sepsis Event


Evaluation


Height, Weight, BMI


Height: 5'8.00"


Weight: 183lbs. 3.0oz. 83.909672ef; 23.88 BMI


Method:Estimated





Exam


Exam





Vital Signs








  Date Time  Temp Pulse Resp B/P (MAP) Pulse Ox O2 Delivery O2 Flow Rate FiO2


 


8/24/20 10:35      Nasal Cannula 3.00 


 


8/24/20 09:00     96 Nasal Cannula 3.00 


 


8/24/20 08:00     94 Nasal Cannula 2.00 


 


8/24/20 08:00  78 14 92/55 (67) 95 Nasal Cannula 2.00 


 


8/24/20 06:44  79      


 


8/24/20 04:00     94 Nasal Cannula 2.00 


 


8/24/20 04:00 36.0       


 


8/24/20 04:00  77 19 90/57 (68) 93 Nasal Cannula 2.00 


 


8/24/20 03:25  83 14 107/76 (86) 94 Nasal Cannula 2.00 


 


8/24/20 02:14  76 35 97/69 (78) 95 Nasal Cannula 2.00 


 


8/24/20 01:00  96 13 105/74 (84) 93 Nasal Cannula 2.00 


 


8/24/20 01:00  101      


 


8/24/20 00:00     93 Nasal Cannula 2.00 


 


8/24/20 00:00  98 19 118/79 (92) 92 Nasal Cannula 2.00 


 


8/23/20 23:00  81 22 109/78 (88) 95 Nasal Cannula 2.00 


 


8/23/20 22:31      Nasal Cannula 2.00 


 


8/23/20 22:00  76 9 93/62 (72) 95 Nasal Cannula 2.00 


 


8/23/20 22:00  76 9 93/62 (72) 95 Nasal Cannula 2.00 


 


8/23/20 21:45  77 15 91/62 (72) 95 Nasal Cannula 2.00 


 


8/23/20 21:30  77 16 88/57 (67) 94 Nasal Cannula 2.00 


 


8/23/20 21:15  80 20 90/58 (69) 95 Nasal Cannula 2.00 


 


8/23/20 21:01  99      


 


8/23/20 21:01  99      


 


8/23/20 21:00 36.1 97 20 131/84 (100) 96 Nasal Cannula 2.00 


 


8/23/20 21:00  98  131/84 (100)  Nasal Cannula 2.00 


 


8/23/20 20:44 37.3 86 16 103/65 (94) 100 Nasal Cannula 2.00 


 


8/23/20 18:20     98 Nasal Cannula 2.00 


 


8/23/20 16:57 37.3 96 18 120/81 (94) 93 Room Air  


 


8/23/20 16:45      Room Air  














I & O 


 


 8/24/20





 07:00


 


Intake Total 1020 ml


 


Balance 1020 ml








Height & Weight


Height: 5'8.00"


Weight: 183lbs. 3.0oz. 83.673137lr; 23.88 BMI


Method:Estimated


General Appearance:  No Apparent Distress, Chronically ill


HEENT:  PERRL/EOMI, Moist Mucous Membranes; No Scleral Icterus (L), No Scleral 

Icterus (R)


Neck:  Normal Inspection, Supple


Respiratory:  No Accessory Muscle Use, No Respiratory Distress


Cardiovascular:  Regular Rate, Rhythm, No Murmur


Capillary Refill:  Less Than 3 Seconds


Gastrointestinal:  non tender, soft


Extremity:  No Calf Tenderness, No Pedal Edema, Other (right great toe with 

dressing on it, no underlying wound)


Neurologic/Psychiatric:  Disoriented, Other (arouses to verbal stimuli, answered

one question appropriately)


Skin:  Normal Color, Warm/Dry; No Mottled





Results


Lab


Laboratory Tests


8/23/20 16:54








8/24/20 03:30











Assessment/Plan


Assessment/Plan


Pneumonia with large Left pleural effusion 


   -S/p Thoracentesis per Dr. Sharma this morning 


      -Await cytology and analysis 


   -Continue Abx 


   -COVID is pending 


   -Repeat CT of chest today with contrast to r/o mass 


Parkinson's Dementia


Dysphagia


Seizure disorder


   Continue home meds after swallow eval





HTN


   Continue home meds











BELINDA SMITH DO              Aug 24, 2020 11:16

## 2020-08-24 NOTE — NUR
RD ASSESSMENT 



PMHx: 

dementia; Parkinson's disease; chronic constipation; schizophrenia



PT INTERACTION: 

Note pt has dementia and is COVID-pending, per chart review. Note all diet history 
information gathered per chart review. Note avg PO intake <25% x2meal. Note no BM has been 
recorded, and pt currently on bowel regimen of bisacodyl PRN. Note unable to determine 
recent wt hx. Note pt is currently receiving diet order of DYS2 Mechanically Altered diet, 
for aspiration precautions. 



ABNORMAL NUTRITION-RELATED LAB VALUES

LOW: cr 0.55; Ca 8.0; Pro 5.4; alb 2.4

HIGH: glu 114



Est. kcal needs: 1950 kcal | 25 kcal/kg  

Est. Pro needs:  62 g Pro | 0.8 g Pro/kg 



PES STATEMENT: 

Inadequate oral intake (NI-2.1) related to loss of appetite as evidenced by chart review | 
avg PO intake <25% x2meal



INTERVENTION:  

Continue with current diet order of DYS2 Mechanically Altered diet. 

Continue with current supplementation order of Ensure Enlive (vary) with meals TID, for 
increased kcal intake. Provides 350 kcal and 13 g Pro per serving. 

Will continue to follow and reassess as pt needs, intake, and status change. 



MONITOR/EVALUATE:  

PO Intake; Plan of Care; Hydration Status; Weight Status; Lab Values 





KARL Tate, MS, RD, LD

## 2020-08-25 VITALS — DIASTOLIC BLOOD PRESSURE: 74 MMHG | SYSTOLIC BLOOD PRESSURE: 109 MMHG

## 2020-08-25 VITALS — SYSTOLIC BLOOD PRESSURE: 111 MMHG | DIASTOLIC BLOOD PRESSURE: 68 MMHG

## 2020-08-25 VITALS — DIASTOLIC BLOOD PRESSURE: 73 MMHG | SYSTOLIC BLOOD PRESSURE: 121 MMHG

## 2020-08-25 VITALS — DIASTOLIC BLOOD PRESSURE: 67 MMHG | SYSTOLIC BLOOD PRESSURE: 104 MMHG

## 2020-08-25 VITALS — SYSTOLIC BLOOD PRESSURE: 123 MMHG | DIASTOLIC BLOOD PRESSURE: 86 MMHG

## 2020-08-25 VITALS — DIASTOLIC BLOOD PRESSURE: 85 MMHG | SYSTOLIC BLOOD PRESSURE: 134 MMHG

## 2020-08-25 VITALS — SYSTOLIC BLOOD PRESSURE: 127 MMHG | DIASTOLIC BLOOD PRESSURE: 80 MMHG

## 2020-08-25 VITALS — DIASTOLIC BLOOD PRESSURE: 69 MMHG | SYSTOLIC BLOOD PRESSURE: 108 MMHG

## 2020-08-25 LAB
BASOPHILS # BLD AUTO: 0 10^3/UL (ref 0–0.1)
BASOPHILS NFR BLD AUTO: 0 % (ref 0–10)
BUN/CREAT SERPL: 20
CALCIUM SERPL-MCNC: 8.2 MG/DL (ref 8.5–10.1)
CHLORIDE SERPL-SCNC: 105 MMOL/L (ref 98–107)
CO2 SERPL-SCNC: 22 MMOL/L (ref 21–32)
CREAT SERPL-MCNC: 0.56 MG/DL (ref 0.6–1.3)
EOSINOPHIL # BLD AUTO: 0.3 10^3/UL (ref 0–0.3)
EOSINOPHIL NFR BLD AUTO: 4 % (ref 0–10)
ERYTHROCYTE [DISTWIDTH] IN BLOOD BY AUTOMATED COUNT: 13.6 % (ref 10–14.5)
GFR SERPLBLD BASED ON 1.73 SQ M-ARVRAT: > 60 ML/MIN
GLUCOSE SERPL-MCNC: 94 MG/DL (ref 70–105)
HCT VFR BLD CALC: 31 % (ref 40–54)
HGB BLD-MCNC: 10.3 G/DL (ref 13.3–17.7)
LYMPHOCYTES # BLD AUTO: 0.7 X 10^3 (ref 1–4)
LYMPHOCYTES NFR BLD AUTO: 9 % (ref 12–44)
MAGNESIUM SERPL-MCNC: 1.9 MG/DL (ref 1.6–2.4)
MANUAL DIFFERENTIAL PERFORMED BLD QL: NO
MCH RBC QN AUTO: 30 PG (ref 25–34)
MCHC RBC AUTO-ENTMCNC: 33 G/DL (ref 32–36)
MCV RBC AUTO: 91 FL (ref 80–99)
MONOCYTES # BLD AUTO: 0.8 X 10^3 (ref 0–1)
MONOCYTES NFR BLD AUTO: 12 % (ref 0–12)
NEUTROPHILS # BLD AUTO: 5.3 X 10^3 (ref 1.8–7.8)
NEUTROPHILS NFR BLD AUTO: 75 % (ref 42–75)
PHOSPHATE SERPL-MCNC: 3.1 MG/DL (ref 2.3–4.7)
PLATELET # BLD: 403 10^3/UL (ref 130–400)
PMV BLD AUTO: 9.9 FL (ref 7.4–10.4)
POTASSIUM SERPL-SCNC: 4.3 MMOL/L (ref 3.6–5)
SODIUM SERPL-SCNC: 136 MMOL/L (ref 135–145)
WBC # BLD AUTO: 7.1 10^3/UL (ref 4.3–11)

## 2020-08-25 RX ADMIN — SODIUM CHLORIDE SCH MLS/HR: 900 INJECTION INTRAVENOUS at 05:55

## 2020-08-25 RX ADMIN — SODIUM CHLORIDE SCH MLS/HR: 900 INJECTION INTRAVENOUS at 23:58

## 2020-08-25 RX ADMIN — SODIUM CHLORIDE SCH MLS/HR: 900 INJECTION INTRAVENOUS at 17:42

## 2020-08-25 RX ADMIN — SODIUM CHLORIDE, SODIUM LACTATE, POTASSIUM CHLORIDE, AND CALCIUM CHLORIDE SCH MLS/HR: 600; 310; 30; 20 INJECTION, SOLUTION INTRAVENOUS at 00:30

## 2020-08-25 RX ADMIN — CARBIDOPA AND LEVODOPA SCH EA: 25; 100 TABLET ORAL at 08:40

## 2020-08-25 RX ADMIN — DIVALPROEX SODIUM SCH MG: 125 CAPSULE ORAL at 12:42

## 2020-08-25 RX ADMIN — MUPIROCIN SCH GM: 20 OINTMENT TOPICAL at 20:29

## 2020-08-25 RX ADMIN — SODIUM CHLORIDE SCH MLS/HR: 900 INJECTION INTRAVENOUS at 00:25

## 2020-08-25 RX ADMIN — SODIUM CHLORIDE SCH MLS/HR: 900 INJECTION INTRAVENOUS at 12:42

## 2020-08-25 RX ADMIN — MUPIROCIN SCH GM: 20 OINTMENT TOPICAL at 08:44

## 2020-08-25 RX ADMIN — CARBIDOPA AND LEVODOPA SCH EA: 25; 100 TABLET ORAL at 12:42

## 2020-08-25 RX ADMIN — PAROXETINE HYDROCHLORIDE SCH MG: 20 TABLET, FILM COATED ORAL at 08:40

## 2020-08-25 RX ADMIN — DIVALPROEX SODIUM SCH MG: 125 CAPSULE ORAL at 20:29

## 2020-08-25 RX ADMIN — RISPERIDONE SCH MG: 1 TABLET, FILM COATED ORAL at 08:41

## 2020-08-25 RX ADMIN — DIVALPROEX SODIUM SCH MG: 125 CAPSULE ORAL at 08:44

## 2020-08-25 RX ADMIN — CARBIDOPA AND LEVODOPA SCH EA: 25; 100 TABLET ORAL at 20:26

## 2020-08-25 RX ADMIN — ENOXAPARIN SODIUM SCH MG: 100 INJECTION SUBCUTANEOUS at 09:45

## 2020-08-25 NOTE — PROGRESS NOTE - HOSPITALIST
Subjective


HPI/CC On Admission


Date Seen by Provider:  Aug 25, 2020


Time Seen by Provider:  08:52


Pt is a 59yoCM with a PMH of advanced dementia who presented to the ER due to 

cough and fever. He is unable to provide me any history. He was able to say "I'm

working on it." when asked how he was doing but otherwise only mumbled or did 

not respond to verbal cues. All history is obtained from the chart. Apparently 

he lives at [facility] and was noticed to have a cough and fever. 

I am unsure for how long or how high of a fever. He was swabbed for COVID in the

ER but has not had any exposures or cases at [facility]. He was found to have a large 

left pleural effusion and was admitted for PNA. Thoracentesis was done in the ER

with 2500mL of fluid drained.


Subjective/Events-last exam


Pt awake and alert. Still mostly mumbles. I wasn't able to understand any of the

mumbles today.





Focused Exam


Lactate Level


20 16:54: Lactic Acid Level 1.10








Objective


Exam


Vital Signs





Vital Signs








  Date Time  Temp Pulse Resp B/P (MAP) Pulse Ox O2 Delivery O2 Flow Rate FiO2


 


20 08:00     95 Room Air  


 


20 08:00  81 16 123/86 (98)    


 


20 04:00 35.5       


 


20 20:00       2.00 





Capillary Refill : Less Than 3 Seconds


General Appearance:  No Apparent Distress, Chronically ill


Respiratory:  No Accessory Muscle Use, Decreased Breath Sounds (left)


Cardiovascular:  Regular Rate, Rhythm, No Murmur


Gastrointestinal:  Normal Bowel Sounds, Non Tender, Soft


Neurologic/Psychiatric:  Alert, Aphasia





Results/Procedures


Lab


Laboratory Tests


20 04:40








Patient resulted labs reviewed.


Imaging:  Reviewed Imaging Report





Assessment/Plan


Assessment and Plan


Assess & Plan/Chief Complaint


Pneumonia


Pleural Effusion


   Not sepsis


   s/p thoracentesis in ER with 2500mL


      - fluid consistent with exudative effusion


   Await cultures from fluid- still pending


   Blood culture with [facility]- ?contaminant but will send for ID


   Continue abx


   COVID negative





Parkinson's Dementia


Dysphagia


Seizure disorder


   Rn reports difficulty swallowing


   SLP consulted placed- recommends soft diet with thin liquids


   Continue home meds after swallow eval


   Would likely benefit from hospice or palliative bridge program upon discharge





HTN


   Continue home meds





DVT ppx: lovenox





Diagnosis/Problems


Diagnosis/Problems





(1) Pleural effusion, left


Status:  Acute


(2) Dementia


(3) Essential (primary) hypertension





Clinical Quality Measures


DVT/VTE Risk/Contraindication:


Risk Factor Score Per Nursin


RFS Level Per Nursing on Admit:  4+=Very High











DEON CALL MD              Aug 25, 2020 08:55

## 2020-08-25 NOTE — PULMONARY PROGRESS NOTE
Subjective


Time Seen by a Provider:  05:34





Sepsis Event


Evaluation


Height, Weight, BMI


Height: 5'8.00"


Weight: 183lbs. 3.0oz. 83.821516av; 23.88 BMI


Method:Estimated





Focused Exam


Lactate Level


8/23/20 16:54: Lactic Acid Level 1.10





Exam


Exam





Vital Signs








  Date Time  Temp Pulse Resp B/P (MAP) Pulse Ox O2 Delivery O2 Flow Rate FiO2


 


8/25/20 04:00 35.5 75 24 111/68 (82) 93 Room Air  


 


8/25/20 03:40     93 Room Air  


 


8/25/20 01:00  97      


 


8/25/20 00:24  62 16 109/74 (86) 95 Room Air  


 


8/24/20 23:00 36.2     Room Air  


 


8/24/20 23:00     96 Room Air  


 


8/24/20 21:00     94 Room Air  


 


8/24/20 20:10 36.8 79 20 116/72 (87) 93 Room Air  


 


8/24/20 20:00  85 27 116/72 (87) 92 Nasal Cannula 2.00 


 


8/24/20 20:00     94 Room Air  


 


8/24/20 19:00  88      


 


8/24/20 16:00     96 Room Air 0.00 


 


8/24/20 16:00  94 21  90 Nasal Cannula 2.00 


 


8/24/20 12:36  73      


 


8/24/20 12:00     94 Nasal Cannula 2.00 


 


8/24/20 12:00    100/61 (74)    


 


8/24/20 10:35      Nasal Cannula 3.00 


 


8/24/20 09:00     96 Nasal Cannula 3.00 


 


8/24/20 08:00     94 Nasal Cannula 2.00 


 


8/24/20 08:00  78 14 92/55 (67) 95 Nasal Cannula 2.00 


 


8/24/20 06:44  79      














I & O 


 


 8/25/20





 07:00


 


Intake Total 1155 ml


 


Balance 1155 ml








Height & Weight


Height: 5'8.00"


Weight: 183lbs. 3.0oz. 83.092567wj; 23.88 BMI


Method:Estimated


General Appearance:  No Apparent Distress, Chronically ill


HEENT:  PERRL/EOMI, Moist Mucous Membranes; No Scleral Icterus (L), No Scleral 

Icterus (R)


Neck:  Normal Inspection, Supple


Respiratory:  No Accessory Muscle Use, No Respiratory Distress


Cardiovascular:  Regular Rate, Rhythm, No Murmur


Capillary Refill:  Less Than 3 Seconds


Gastrointestinal:  non tender, soft


Extremity:  No Calf Tenderness, No Pedal Edema, Other (right great toe with 

dressing on it, no underlying wound)


Neurologic/Psychiatric:  Disoriented, Other (arouses to verbal stimuli, answered

one question appropriately)


Skin:  Normal Color, Warm/Dry; No Mottled





Results


Lab


Laboratory Tests


8/23/20 16:54








8/24/20 03:30








8/25/20 04:40











Assessment/Plan


Assessment/Plan


Pneumonia with large Left pleural effusion 


   -S/p Thoracentesis per Dr. Sharma this morning 


      -Await cytology and analysis 


   -Continue Abx 


   -COVID is pending 


   -Repeat CT of chest - reviewed 


   -Check BNP 


Parkinson's Dementia


Dysphagia


Seizure disorder


   Continue home meds after swallow eval





HTN


   Continue home meds











BELINDA SMITH DO              Aug 25, 2020 05:39

## 2020-08-25 NOTE — PHYSICAL THERAPY EVALUATION
PT Evaluation-General


Medical Diagnosis


Admission Date


Aug 23, 2020 at 18:51


Medical Diagnosis:  Pneumonia


Onset Date:  Aug 24, 2020





Therapy Diagnosis


Therapy Diagnosis:  debility





Height/Weight


Height (Feet):  5


Height (Inches):  8.00


Weight (Pounds):  183


Weight (Ounces):  3.0





Precautions


Precautions/Isolations:  Airborne Isolation, Aspiration, Fall Prevention





Weight Bear Status


Right Lower Extremity:  Right


Weight Bearing/Tolerated


Left Lower Extremity:  Left


Weight Bearing/Tolerated





Referral


Physician:  Lawson


Reason for Referral:  Evaluation/Treatment





Medical History


Pertinent Medical History:  Dementia (advanced), Parkinson's (advanced)


Additional Medical History


schizophrenia


Current History


ER secondary to cough


Reviewed History:  Yes





Social History


Home:  Nursing Home





Prior


Prior Level of Function


SCALE: Activities may be completed with or without assistive devices.





6-Indepedent-patient completes the activity by him/herself with no assistance 

from a helper.


5-Set-up or Clean-up Assistance-helper sets up or cleans up; patient completes 

activity. Valentine assists only prior to or  


    following the activity.


4-Supervision or Touching Assistance-helper provides verbal cues and/or 

touching/steadying and/or contact guard assistance as patient completes 

activity. Assistance may be provided   


    throughout the activity or intermittently.


3-Partial/Moderate Assistance-helper does LESS THAN HALF the effort. Valentine 

lifts, holds or supports trunk or limbs, but provides less than half the effort.


2-Substantial/Maximal Assistance-helper does MORE THAN HALF the effort. Valentine 

lifts or holds trunk or limbs and provides more than half the effort.


3-Znfyabgod-metjrl does ALL the effort. Patient does none of the effort to 

complete the activity. Or, the assistance of 2 or more helpers is required for 

the patient to complete the  


    activity.


If activity was not attempted, code reason:


7-Patient Refused.


9-Not Applicable-not attempted and the patient did not perform the activity 

before the current illness, exacerbation or injury.


10-Not Attempted due to Environmental Limitations-(lack of equipment, weather 

restraints, etc.).


88-Not Attempted due to Medical Conditions or Safety Concerns.


Bed Mobility:  1


Transfers (B,C,W/C):  1


Gait:  9


Stairs:  9


Wheelchair Mobility:  1


Indoor Mobility (Ambulation):  Dependent


Stairs:  Not Applicalbe





PT Evaluation-Current


Subjective


Mumbles





Objective


Patient Orientation:  Confused, Mumbles


Attachments:  IV





ROM/Strength


ROM Lower Extremities


severely rigid due to advanced Parkinson's (PT unable to break tone)


Strength Lower Extremities


unable to test due to advanced dementia and Parkinson's





Integumentary/Posture


Integumentary


refer to nursing notes


Bowel Incontinence:  Yes


Bladder Incontinence:  Yes


Posture


rigid





Neuromuscular


(Tone, Coordination, Reflexes)


Parkinson's





Sensory


Vision:  Unable to Assess


Hearing:  Unable to Assess





Transfers


Roll Left to Right (QC):  1 (x 2)


Sit to Lying (QC):  1 (x 2)


Lying to Sitting/Side of Bed(Q:  1 (x 2)


Sit to Stand (QC):  1 (x 2)


completely dependent with all mobility





Gait


Does the Patient Walk?:  No and Walking Goal NOT indicated





Balance


Sitting Static:  Poor


Sitting Dynamic:  Poor


Standing Static:  Poor


 Standing Dynamic:  Poor





Assessment/Needs


59 y.o. male, with advanced dementia and Parkinson's, is currently at OF of 

complete dependency with all gross motor skills and mobility.  No skilled 

therapy indicated.


Rehab Potential:  Poor





PT Plan


Treatment/Plan


Treatment Plan:  Discontinue PT, goals met


Treatment Duration:  Aug 25, 2020


Frequency:  1 time per week


Estimated Hrs Per Day:  .5 hour per day





Time/GCodes


Time In:  1000


Time Out:  1021


Total Billed Treatment Time:  21


Total Billed Treatment


1 visit


[facility] 21 min











WHITLEY COELLO PT              Aug 25, 2020 10:29

## 2020-08-25 NOTE — NUR
CM/SS visited with patient for discharge planning. 



The patient was lying in bed at time of visit. CM/SS asked how the patient was doing today, 
he reported "I'm just fine". The patient was able to mumble in attempt to answer this sw 
questions but this sw was unable to make out most the mumbles. 



Facility: The patient is a resident of [facility]. CM/SS attempted to contact 
Glendy from the facility. She was unavailable at that time. Will try again later. 



DPOA: CM/SS contacted Rosalino the patient's power of  to give an update and a time 
frame for possible discharge back to facility. He verbalized understanding. He had questions 
regarding medical updates and how to speak with patient on the phone. CM/SS informed him to 
contact the main hospital line and be transferred to the ICU to speak with Nurse for update/ 
phone call. He verbalized understanding. No further questions at this time. 



CM/SS will continue to follow for discharge planning

## 2020-08-25 NOTE — NUR
Patient arrived to room 408 from ICU 6. Patient lying in bed at this time, voices no 
concerns, call light within reach. I agree with previous RN's assessment and will continue 
to monitor.

## 2020-08-25 NOTE — OCCUPATIONAL THERAPY EVAL
OT Evaluation-General/PLF


Medical Diagnosis


Admission Date


Aug 23, 2020 at 18:51


Medical Diagnosis:  Pneumonia


Onset Date:  Aug 24, 2020





Therapy Diagnosis


Therapy Diagnosis:  Debility





Height/Weight


Height (Feet):  5


Height (Inches):  8.00


Weight (Pounds):  183


Weight (Ounces):  3.0





Precautions


Precautions/Isolations:  Airborne Isolation, Aspiration, Fall Prevention





Referral


Physician:  Lawson


Referral Reason:  Activity Tolerance, Self Care, Evaluation/Treatment, S

trengthening/ROM





Medical History


Pertinent Medical History:  Dementia (advanced), Parkinson's (advanced)


Additional Medical History


 Seizure disorder, NH resident


Current History


Pt. came to ER with reports of coughing/SOA.  Found to have pneumonia.  

Thoracentesis performed with 2500mL drained.


Reviewed History:  Yes





Social History


Home:  Nursing Home


Current Living Status:  24 hour


Entry Into Home:  Level Entry





ADL-Prior Level of Function


SCALE: Activities may be completed with or without assistive devices.





6-Indepedent-patient completes the activity by him/herself with no assistance 

from a helper.


5-Set-up or Clean-up Assistance-helper sets up or cleans up; patient completes 

activity. Excello assists only prior to or  


    following the activity.


4-Supervision or Touching Assistance-helper provides verbal cues and/or 

touching/steadying and/or contact guard assistance as patient completes 

activity. Assistance may be provided   


    throughout the activity or intermittently.


3-Partial/Moderate Assistance-helper does LESS THAN HALF the effort. Excello 

lifts, holds or supports trunk or limbs, but provides less than half the effort.


2-Substantial/Maximal Assistance-helper does MORE THAN HALF the effort. Excello 

lifts or holds trunk or limbs and provides more than half the effort.


2-Eekvlorjx-qnggrr does ALL the effort. Patient does none of the effort to 

complete the activity. Or, the assistance of 2 or more helpers is required for 

the patient to complete the  


    activity.


If activity was not attempted, code reason:


7-Patient Refused.


9-Not Applicable-not attempted and the patient did not perform the activity 

before the current illness, exacerbation or injury.


10-Not Attempted due to Environmental Limitations-(lack of equipment, weather 

restraints, etc.).


88-Not Attempted due to Medical Conditions or Safety Concerns.


ADL PLOF Comments


It was reported to this therapist that pt. is at previous baseline functionally.


Self Care:  Dependent


Functional Cognition:  Dependent





OT Current Status


Subjective


Pt. keeps eyes closed throughout evaluation.  Does not speak or answer 

questions.  Does not stimulate to sternal rubs or PROM of UE.





Mental Status/Objective


Patient Orientation:  Unresponsive


Attachments:  IV





ADL-Treatment


Pt. in bed asleep.  OT attempts different methods to arouse him for occupational

therapy evaluation.  Pt. does not open eyes or attend in any way.  OT completes 

sternal rubs with no awareness.  OT performed bilateral UE PROM to all joints, 

with moderate spasticity noted.  Pt's face cleansed with washcloth.  Pt. still 

does not open eyes.  OT re-positioned head/neck for comfort.  No other treatment

provided.  Pt. not appropriate at this time for skilled therapy needs, as he is 

unable to attend or participate, and is at reported PLOF.





Education


OT Patient Education:  Correct positioning, Exercise program, Rehab process


Teaching Recipient:  Patient


Teaching Methods:  Demonstration, Discussion


Response to Teaching:  Unable to Return Demonstration, Unable to Comprehend





OT Long Term Goals


Long Term Goals


Time Frame:  Aug 25, 2020


1=Demonstrate adherence to instructed precautions during ADL tasks.


2=Patient will verbalize/demonstrate understanding of assistive 

devices/modifications for ADL.


3=Patient will improve strength/tolerance for activity to enable patient to 

perform ADL's.


No OT skilled needs identified at this time as pt. was at dependent level 

previously.





OT Education/Plan


Problem List/Assessment


Assessment:  No Skilled OT Needs ID'd





Discharge Recommendations


Plan/Recommendations:  Discontinue OT


Therapy Discharge Recommendati:  24 Hour Supervision





Treatment Plan/Plan of Care


Plan of Care:  OTHER (No treatment at this time.)


Treatment Duration:  Aug 25, 2020


Frequency:  1 time per week


Estimated Hrs Per Day:  .25 hour per day


Agreement:  No


Rehab Potential:  Poor





Time/GCodes


Start Time:  10:50


Stop Time:  11:00


Total Time Billed (hr/min):  10


Billed Treatment Time


1, WERNER LOPEZ OT           Aug 25, 2020 11:30

## 2020-08-26 VITALS — DIASTOLIC BLOOD PRESSURE: 88 MMHG | SYSTOLIC BLOOD PRESSURE: 130 MMHG

## 2020-08-26 VITALS — DIASTOLIC BLOOD PRESSURE: 66 MMHG | SYSTOLIC BLOOD PRESSURE: 113 MMHG

## 2020-08-26 VITALS — SYSTOLIC BLOOD PRESSURE: 120 MMHG | DIASTOLIC BLOOD PRESSURE: 76 MMHG

## 2020-08-26 VITALS — SYSTOLIC BLOOD PRESSURE: 133 MMHG | DIASTOLIC BLOOD PRESSURE: 71 MMHG

## 2020-08-26 VITALS — DIASTOLIC BLOOD PRESSURE: 62 MMHG | SYSTOLIC BLOOD PRESSURE: 110 MMHG

## 2020-08-26 RX ADMIN — PAROXETINE HYDROCHLORIDE SCH MG: 20 TABLET, FILM COATED ORAL at 08:47

## 2020-08-26 RX ADMIN — SODIUM CHLORIDE SCH MLS/HR: 900 INJECTION INTRAVENOUS at 13:22

## 2020-08-26 RX ADMIN — RISPERIDONE SCH MG: 1 TABLET, FILM COATED ORAL at 08:47

## 2020-08-26 RX ADMIN — DIVALPROEX SODIUM SCH MG: 125 CAPSULE ORAL at 13:22

## 2020-08-26 RX ADMIN — CARBIDOPA AND LEVODOPA SCH EA: 25; 100 TABLET ORAL at 13:22

## 2020-08-26 RX ADMIN — MUPIROCIN SCH GM: 20 OINTMENT TOPICAL at 08:52

## 2020-08-26 RX ADMIN — DIVALPROEX SODIUM SCH MG: 125 CAPSULE ORAL at 08:51

## 2020-08-26 RX ADMIN — ENOXAPARIN SODIUM SCH MG: 100 INJECTION SUBCUTANEOUS at 08:48

## 2020-08-26 RX ADMIN — SODIUM CHLORIDE SCH MLS/HR: 900 INJECTION INTRAVENOUS at 06:08

## 2020-08-26 RX ADMIN — CARBIDOPA AND LEVODOPA SCH EA: 25; 100 TABLET ORAL at 08:47

## 2020-08-26 NOTE — NUR
ABELINO/KRISTIN discharge. 



Plan: The patient will return to [facility] retirement. ABELINO/SS unsure if patient 
will leave with hospice at this time. 



The patient's physician has attempted to get in touch with Rosalino (PEG) to discuss hospice as 
an option. She left voice mail. CM/SS attempted to contact Rosalino, no answer. 



ABELINO/KRISTIN contacted [facility] and spoke with Glendy to inform them of patient's 
discharge to facility. ABELINO/KRISTIN discussed with Glendy that physician still is planning to discuss 
Hospice with Rosalino. She verbalized understanding. ABELINO/SS set up a late  time for 3:00 
p.m. This time is subject to change depending on discussion with DPTRESSA. CM/SS faxed updated 
clinical and will sent discharge when available. 

-------------------------------------------------------------------------------

Addendum: 08/26/20 at 1502 by DAVID LAWSON 

-------------------------------------------------------------------------------

Update: Patient will not be discharging with hospice at this time. Finalized orders faxed to 
facility.

## 2020-08-26 NOTE — DISCHARGE SUMMARY
Diagnosis/Chief Complaint


Date of Admission


Aug 23, 2020 at 18:51


Date of Discharge





Admission Diagnosis


Pneumonia with pleural effusion


Primary Care


Ric Putnam GHULAM DO


Discharge Diagnosis





(1) Pleural effusion, left


Status:  Acute


(2) Dementia


(3) Essential (primary) hypertension





Discharge Summary


Procedures/Consulations


Dr Arnold- Pulm


Dr Sharma- Surgery





Discharge Physical Exam


Allergies:  


Coded Allergies:  


     No Known Drug Allergies (Unverified , 18)


Vitals & I&Os





Vital Signs








  Date Time  Temp Pulse Resp B/P (MAP) Pulse Ox O2 Delivery O2 Flow Rate FiO2


 


20 12:00 37.2 72 20 120/76 (91) 94 Room Air  


 


20 20:00       2.00 








General Appearance:  No Apparent Distress, Chronically ill


Respiratory:  Lungs Clear, No Respiratory Distress


Neurologic/Psychiatric:  Alert, Disoriented





Hospital Course


Pt is a 59yoCM with a PMH of Parkinson's Dementia who presented to the ER due to

low oxygen levels and was admitted due to pneumonia. He underwent thoracentesis 

and 2500mL was drained. He was treated with IV antibiotics and responded well. 

He was titrated off of oxygen. There was concern regarding aspiration and he was

seen by SLP who recommended dysphagia 2 diet with thin liquids. I did discuss 

this with family and placed orders accordingly for the nursing home. Discussed 

that if difficulties with swallowing continue or worsen could be a sign of 

advancing dementia and that hospice could be beneficial at that point.


Labs (last 24 hrs)





Microbiology


20 Gram Stain - Final, Resulted


          


20 Body Fluid Culture - Preliminary, Resulted


          No growth


20 Urine Culture - Final, Complete


          NO GROWTH


20 Blood Culture - Preliminary, Resulted


          Staphylococcus hominis


Patient resulted labs reviewed.


Imaging:  Reviewed Imaging Report





Discussion & Recommendations


Discharge Planning:  >30 minutes discharge planning





Discharge


Home Medications:





Active Scripts


Active


Keflex (Cephalexin) 500 Mg Capsule 500 Mg PO BID


Reported


Bisacodyl 10 Mg Supp.rect 10 Mg RC DAILY PRN


     USE IF NO BM IN 3 DAYS IF MOM NOT EFFECTIVE


Docusate Sodium 100 Mg Capsule 100 Mg PO DAILY


Depakote Sprinkle (Divalproex Sodium) 125 Mg Cap 125 Mg PO 1400


Divalproex Sodium 125 Mg Cap.sprink 250 Mg PO BID


     TAKES 2 (125MG) CAPS


Mupirocin 22 Gm Oint...g. 1 Applic TP BID


     APPLY TO THE RIGHT GREAT TOE AND WRAP WITH GAUZE DRESSING


Risperidone 0.5 Mg Tablet 0.5 Mg PO DAILY


Milk of Magnesia (Magnesium Hydroxide) 2,400 Mg/10 Ml Oral.susp 30 Ml PO DAILY 

PRN


Loperamide (Loperamide HCl) 2 Mg Tablet 2-4 Mg PO TID PRN MDD 6 TABS


     GIVE 2 TABS AS NEEDED AFTER LOOSE STOOL, THEN 1 TAB AFTER EACH LOOSE


     STOOL


Fleet Enema (Na Phos,M-B/Na Phos,Di-Ba) 133 Ml Enema 133 Ml RC DAILY PRN


     GIVE IF NO BM IN 5 DAYS


Tylenol (Acetaminophen) 325 Mg Tablet 650 Mg PO Q4H PRN


     TAKES 2 (325 MG) TABLETS


Sinemet  mg Tablet (Carbidopa/Levodopa) 1 Each Tablet 1 Tab PO TID


Paxil (Paroxetine HCl) 20 Mg Tablet 20 Mg PO DAILY


Entacapone 200 Mg Tablet 200 Mg PO BID


Diltiazem HCl 120 Mg Tablet 120 Mg PO DAILY





Instructions to patient/family


Please see electronic discharge instructions given to patient.





Clinical Quality Measures


DVT/VTE Risk/Contraindication:


Risk Factor Score Per Nursin


RFS Level Per Nursing on Admit:  4+=Very High











DEON CALL MD              Aug 26, 2020 11:35

## 2020-08-26 NOTE — DISCHARGE INST-SIMPLE/STANDARD
Discharge Inst-Standard


Patient Instructions/Follow Up


Plan of Care/Instructions/FU:  


Please continue to take your medicatons as written. Please follow up with


your primary care doctor to follow up this hospital stay.


Activity as Tolerated:  Yes


Discharge Diet:  Soft Diet (Dysphagia level 2, mechanically altered with thin 

liquids)


Return to The Hospital For:  


Chest pain, shortness of breath, fever, confusion, low oxygen saturations,


if you feel you are getting worse.





Planned Outpatient Orders/Ref.


Pneu Vac Indicated:  Yes











DEON CALL MD              Aug 26, 2020 13:37

## 2020-08-26 NOTE — NUR
Palliative Care RN in to see patient.  He is resting with eyes closed.  He responds to 
verbal cues.  He is verbal but not intelligibly.  Plan is for return to [facility] today.  Dr. Pathak to discuss with his family that hospice would be beneficial as he 
progresses in the dementia process.

## 2020-10-05 ENCOUNTER — HOSPITAL ENCOUNTER (INPATIENT)
Dept: HOSPITAL 75 - ER | Age: 60
LOS: 4 days | Discharge: NURSING, REHAB OR LONG-TERM CARE | DRG: 177 | End: 2020-10-09
Attending: INTERNAL MEDICINE | Admitting: INTERNAL MEDICINE
Payer: MEDICAID

## 2020-10-05 VITALS — SYSTOLIC BLOOD PRESSURE: 131 MMHG | DIASTOLIC BLOOD PRESSURE: 78 MMHG

## 2020-10-05 VITALS — DIASTOLIC BLOOD PRESSURE: 88 MMHG | SYSTOLIC BLOOD PRESSURE: 141 MMHG

## 2020-10-05 VITALS — BODY MASS INDEX: 21.65 KG/M2 | WEIGHT: 158.07 LBS | HEIGHT: 71.65 IN

## 2020-10-05 VITALS — DIASTOLIC BLOOD PRESSURE: 58 MMHG | SYSTOLIC BLOOD PRESSURE: 108 MMHG

## 2020-10-05 DIAGNOSIS — G20: ICD-10-CM

## 2020-10-05 DIAGNOSIS — R13.10: ICD-10-CM

## 2020-10-05 DIAGNOSIS — F02.81: ICD-10-CM

## 2020-10-05 DIAGNOSIS — K59.09: ICD-10-CM

## 2020-10-05 DIAGNOSIS — F32.9: ICD-10-CM

## 2020-10-05 DIAGNOSIS — I10: ICD-10-CM

## 2020-10-05 DIAGNOSIS — U07.1: Primary | ICD-10-CM

## 2020-10-05 DIAGNOSIS — Z66: ICD-10-CM

## 2020-10-05 DIAGNOSIS — J12.89: ICD-10-CM

## 2020-10-05 DIAGNOSIS — F91.8: ICD-10-CM

## 2020-10-05 DIAGNOSIS — R47.01: ICD-10-CM

## 2020-10-05 DIAGNOSIS — E86.0: ICD-10-CM

## 2020-10-05 LAB
ALBUMIN SERPL-MCNC: 3 GM/DL (ref 3.2–4.5)
ALP SERPL-CCNC: 133 U/L (ref 40–136)
ALT SERPL-CCNC: 21 U/L (ref 0–55)
AMORPH SED URNS QL MICRO: (no result) /LPF
APTT BLD: 42 SEC (ref 24–35)
APTT PPP: (no result) S
BACTERIA #/AREA URNS HPF: (no result) /HPF
BASOPHILS # BLD AUTO: 0 10^3/UL (ref 0–0.1)
BASOPHILS NFR BLD AUTO: 0 % (ref 0–10)
BILIRUB SERPL-MCNC: 0.3 MG/DL (ref 0.1–1)
BILIRUB UR QL STRIP: (no result)
BUN/CREAT SERPL: 36
CALCIUM SERPL-MCNC: 8.6 MG/DL (ref 8.5–10.1)
CHLORIDE SERPL-SCNC: 103 MMOL/L (ref 98–107)
CO2 SERPL-SCNC: 22 MMOL/L (ref 21–32)
CREAT SERPL-MCNC: 0.64 MG/DL (ref 0.6–1.3)
D DIMER PPP FEU-MCNC: 3.74 UG/ML (ref 0–0.49)
EOSINOPHIL # BLD AUTO: 0 10^3/UL (ref 0–0.3)
EOSINOPHIL NFR BLD AUTO: 0 % (ref 0–10)
FIBRINOGEN PPP-MCNC: CLEAR MG/DL
GFR SERPLBLD BASED ON 1.73 SQ M-ARVRAT: > 60 ML/MIN
GLUCOSE SERPL-MCNC: 97 MG/DL (ref 70–105)
GLUCOSE UR STRIP-MCNC: NEGATIVE MG/DL
HCT VFR BLD CALC: 36 % (ref 40–54)
HGB BLD-MCNC: 11.4 G/DL (ref 13.3–17.7)
INR PPP: 1 (ref 0.8–1.4)
KETONES UR QL STRIP: (no result)
LEUKOCYTE ESTERASE UR QL STRIP: NEGATIVE
LYMPHOCYTES # BLD AUTO: 0.5 10^3/UL (ref 1–4)
LYMPHOCYTES NFR BLD AUTO: 9 % (ref 12–44)
MANUAL DIFFERENTIAL PERFORMED BLD QL: NO
MCH RBC QN AUTO: 27 PG (ref 25–34)
MCHC RBC AUTO-ENTMCNC: 32 G/DL (ref 32–36)
MCV RBC AUTO: 84 FL (ref 80–99)
MONOCYTES # BLD AUTO: 0.5 10^3/UL (ref 0–1)
MONOCYTES NFR BLD AUTO: 10 % (ref 0–12)
NEUTROPHILS # BLD AUTO: 4 10^3/UL (ref 1.8–7.8)
NEUTROPHILS NFR BLD AUTO: 81 % (ref 42–75)
NITRITE UR QL STRIP: NEGATIVE
PH UR STRIP: 6 [PH] (ref 5–9)
PLATELET # BLD: 320 10^3/UL (ref 130–400)
PMV BLD AUTO: 10.7 FL (ref 9–12.2)
POTASSIUM SERPL-SCNC: 3.8 MMOL/L (ref 3.6–5)
PROT SERPL-MCNC: 7.7 GM/DL (ref 6.4–8.2)
PROT UR QL STRIP: (no result)
PROTHROMBIN TIME: 14.1 SEC (ref 12.2–14.7)
RBC #/AREA URNS HPF: (no result) /HPF
SODIUM SERPL-SCNC: 137 MMOL/L (ref 135–145)
SP GR UR STRIP: 1.02 (ref 1.02–1.02)
WBC # BLD AUTO: 5 10^3/UL (ref 4.3–11)
WBC #/AREA URNS HPF: (no result) /HPF

## 2020-10-05 PROCEDURE — 86900 BLOOD TYPING SEROLOGIC ABO: CPT

## 2020-10-05 PROCEDURE — 80053 COMPREHEN METABOLIC PANEL: CPT

## 2020-10-05 PROCEDURE — 87040 BLOOD CULTURE FOR BACTERIA: CPT

## 2020-10-05 PROCEDURE — 71045 X-RAY EXAM CHEST 1 VIEW: CPT

## 2020-10-05 PROCEDURE — 86901 BLOOD TYPING SEROLOGIC RH(D): CPT

## 2020-10-05 PROCEDURE — 85025 COMPLETE CBC W/AUTO DIFF WBC: CPT

## 2020-10-05 PROCEDURE — 85379 FIBRIN DEGRADATION QUANT: CPT

## 2020-10-05 PROCEDURE — 36415 COLL VENOUS BLD VENIPUNCTURE: CPT

## 2020-10-05 PROCEDURE — 83605 ASSAY OF LACTIC ACID: CPT

## 2020-10-05 PROCEDURE — 81000 URINALYSIS NONAUTO W/SCOPE: CPT

## 2020-10-05 PROCEDURE — 84145 PROCALCITONIN (PCT): CPT

## 2020-10-05 PROCEDURE — 51702 INSERT TEMP BLADDER CATH: CPT

## 2020-10-05 PROCEDURE — 86850 RBC ANTIBODY SCREEN: CPT

## 2020-10-05 PROCEDURE — 86141 C-REACTIVE PROTEIN HS: CPT

## 2020-10-05 PROCEDURE — 85610 PROTHROMBIN TIME: CPT

## 2020-10-05 PROCEDURE — 87088 URINE BACTERIA CULTURE: CPT

## 2020-10-05 PROCEDURE — 85007 BL SMEAR W/DIFF WBC COUNT: CPT

## 2020-10-05 PROCEDURE — 85027 COMPLETE CBC AUTOMATED: CPT

## 2020-10-05 PROCEDURE — 85730 THROMBOPLASTIN TIME PARTIAL: CPT

## 2020-10-05 PROCEDURE — 71275 CT ANGIOGRAPHY CHEST: CPT

## 2020-10-05 RX ADMIN — SODIUM CHLORIDE, SODIUM LACTATE, POTASSIUM CHLORIDE, AND CALCIUM CHLORIDE SCH MLS/HR: 600; 310; 30; 20 INJECTION, SOLUTION INTRAVENOUS at 23:45

## 2020-10-05 NOTE — NUR
ROMULO HENDERSON admitted to room 422-1, with an admitting diagnosis of COVID 19, PNA, 
DEHYDRATION, on 10/05/20 from [facility] via [facility], accompanied by STAFF.ROMULO HENDERSON 
introduced to surroundings, call light, bed controls, phone, TV, temperature control, 
lights, meal times, smoking policy, visitor policy, side rail policy, bathrooms and showers. 
 Patient Rights given to patient in the handbook. ROMULO HENDERSON verbalizes understanding that 
[facility] is not responsible for the loss or damage to any personal effects or valuables 
that are kept in the patients possession during their hospitalization.

## 2020-10-05 NOTE — ED GENERAL
General


Chief Complaint:  General Problems/Pain


Stated Complaint:  FEVER


Nursing Triage Note:  


PT ARRIVES BY EMS FROM [facility] WITH C/O FEVER. COVID + KNOWN. 


PT IS NON- VERBAL.


Nursing Sepsis Screen:  No Definite Risk


Source of Information:  Patient


Exam Limitations:  No Limitations





History of Present Illness


Date Seen by Provider:  Oct 5, 2020


Time Seen by Provider:  20:25


Initial Comments


Here by EMS from [facility] with report of increasing 

fever. Patient is COVID-19 positive for at least the last few days. He is 

certainly symptomatic with fever. Has underlying advanced dementia as well as 

Parkinson's disease and is nonverbal currently and is not answering questions. 

Apparently he is not far from baseline although his diet is thickened soft diet 

and apparently does feed self with assistance. Patient is unable to provide any 

information. Go to sleep


Timing/Duration:  2-3 Days


Severity:  Moderate


Associated Systoms:  Cough, Fever/Chills; No Shortness of Air; Weakness





Allergies and Home Medications


Allergies


Coded Allergies:  


     No Known Drug Allergies (Unverified , 18)





Home Medications


Acetaminophen 325 Mg Tablet, 650 MG PO Q4H PRN for PAIN-MILD, (Reported)


   TAKES 2 (325 MG) TABLETS 


Bisacodyl 10 Mg Supp.rect, 10 MG RC DAILY PRN for CONSTIPATION-4TH LINE, 

(Reported)


   USE IF NO BM IN 3 DAYS IF MOM NOT EFFECTIVE 


Carbidopa/Levodopa 1 Each Tablet, 1 TAB PO TID, (Reported)


Cephalexin 500 Mg Capsule, 500 MG PO BID


   Prescribed by: DEON CALL on 20 1342


Diltiazem HCl 120 Mg Tablet, 120 MG PO DAILY, (Reported)


Divalproex Sodium 125 Mg Cap.sprink, 250 MG PO BID, (Reported)


   TAKES 2 (125MG) CAPS 


Divalproex Sodium 125 Mg Cap, 125 MG PO 1400, (Reported)


Docusate Sodium 100 Mg Capsule, 100 MG PO DAILY, (Reported)


Entacapone 200 Mg Tablet, 200 MG PO BID, (Reported)


Loperamide HCl 2 Mg Tablet, 2-4 MG PO TID PRN for DIARRHEA, (Reported)


   GIVE 2 TABS AS NEEDED AFTER LOOSE STOOL, THEN 1 TAB AFTER EACH LOOSE STOOL 


Magnesium Hydroxide 2,400 Mg/10 Ml Oral.susp, 30 ML PO DAILY PRN for 

CONSTIPATION-7TH LINE, (Reported)


Mupirocin 22 Gm Oint...g., 1 APPLIC TP BID, (Reported)


   APPLY TO THE RIGHT GREAT TOE AND WRAP WITH GAUZE DRESSING 


Na Phos,M-B/Na Phos,Di-Ba 133 Ml Enema, 133 ML RC DAILY PRN for CONSTIPATION-8TH

LINE, (Reported)


   GIVE IF NO BM IN 5 DAYS 


Paroxetine HCl 20 Mg Tablet, 20 MG PO DAILY, (Reported)


Risperidone 0.5 Mg Tablet, 0.5 MG PO DAILY, (Reported)





Patient Home Medication List


Home Medication List Reviewed:  Yes





Review of Systems


Review of Systems


Constitutional:  see HPI, chills, fever


Respiratory:  cough


Unable to complete review of systems due to altered mental status and underlying

clinical condition.





Past Medical-Social-Family Hx


Past Med/Social Hx:  Reviewed Nursing Past Med/Soc Hx


Patient Social History


Alcohol Use:  Denies Use


Recreational Drug Use:  No


2nd Hand Smoke Exposure:  No


Recent Foreign Travel:  No


Contact w/Someone Who Travel:  No


Recent Infectious Disease Expo:  Yes


Recent Hopitalizations:  No (unknown)





Immunizations Up To Date


Tetanus Booster (TDap):  Unknown





Seasonal Allergies


Seasonal Allergies:  No





Past Medical History


Respiratory:  No


Cardiac:  No


Neurological:  Yes


Dementia, Parkinson's Disease


Gastrointestinal:  Yes


Chronic Constipation


Musculoskeletal:  No


Psychosocial:  Yes (MANIPULATIVE BEHAVIORS, SEXUAL INAPROPRIATENESS)


Schizophrenia, Depression


Blood Disorders:  No


Adverse Reaction/Blood Tranf:  No





Family Medical History


Reviewed Nursing Family Hx


No Pertinent Family Hx





Physical Exam-Suspected Sepsis


Physical Exam


Vital Signs





Vital Signs - First Documented








 10/5/20 10/5/20





 20:28 20:57


 


Temp  37.8


 


Pulse 85 


 


Resp 17 


 


B/P (MAP) 141/88 (105) 


 


Pulse Ox 93 


 


O2 Delivery Room Air 





Capillary Refill : Less Than 3 Seconds








Blood Pressure Mean:                    105








Height, Weight, BMI


Height: 5'8.00"


Weight: 183lbs. 3.0oz. 83.180546qc; 25.00 BMI


Method:Estimated


General Appearance:  Chronically ill, Mild Distress, Thin


HEENT:  Other (bilateral eyes matted. Mucous membranes dry.)


Neck:  Non Tender, Supple


Respiratory:  Crackles, Other (coarse sounds throughout)


Cardiovascular:  Regular Rate, Rhythm, No Murmur


Gastrointestinal:  Non Tender, Soft


Extremity:  Non Tender, No Calf Tenderness, No Pedal Edema


Neurologic/Psychiatric:  Other (moans but does not answer questions. Does not 

follow commands.)


Skin:  No diaphoresis; other (skin is warm)





Focused Exam


Lactate Level


10/5/20 20:30: Lactic Acid Level 0.80





Lactic Acid Level





Laboratory Tests








Test


 10/5/20


20:30


 


Lactic Acid Level


 0.80 MMOL/L


(0.50-2.00)











Progress/Results/Core Measures


Suspected Sepsis


Recent Fever Within 48 Hours:  Yes


Infection Criteria Present:  Documented Infection


New/Unexplained  Altered Menta:  No


Sepsis Screen:  No Definite Risk


SIRS


Temperature: 


Pulse: 85 


Respiratory Rate: 17


 


Laboratory Tests


10/5/20 20:30: White Blood Count 5.0


Blood Pressure 141 /88 


Mean: 105


 





10/5/20 20:30: Lactic Acid Level 0.80


Laboratory Tests


10/5/20 20:30: 


Creatinine 0.64, INR Comment 1.0, Platelet Count 320, Total Bilirubin 0.3








Results/Orders


Lab Results





Laboratory Tests








Test


 10/5/20


20:30 10/5/20


20:45 Range/Units


 


 


White Blood Count


 5.0 


 


 4.3-11.0


10^3/uL


 


Red Blood Count


 4.22 L


 


 4.30-5.52


10^6/uL


 


Hemoglobin 11.4 L  13.3-17.7  g/dL


 


Hematocrit 36 L  40-54  %


 


Mean Corpuscular Volume 84   80-99  fL


 


Mean Corpuscular Hemoglobin 27   25-34  pg


 


Mean Corpuscular Hemoglobin


Concent 32 


 


 32-36  g/dL





 


Red Cell Distribution Width 14.7 H  10.0-14.5  %


 


Platelet Count


 320 


 


 130-400


10^3/uL


 


Mean Platelet Volume 10.7   9.0-12.2  fL


 


Immature Granulocyte % (Auto) 0    %


 


Neutrophils (%) (Auto) 81 H  42-75  %


 


Lymphocytes (%) (Auto) 9 L  12-44  %


 


Monocytes (%) (Auto) 10   0-12  %


 


Eosinophils (%) (Auto) 0   0-10  %


 


Basophils (%) (Auto) 0   0-10  %


 


Neutrophils # (Auto)


 4.0 


 


 1.8-7.8


10^3/uL


 


Lymphocytes # (Auto)


 0.5 L


 


 1.0-4.0


10^3/uL


 


Monocytes # (Auto)


 0.5 


 


 0.0-1.0


10^3/uL


 


Eosinophils # (Auto)


 0.0 


 


 0.0-0.3


10^3/uL


 


Basophils # (Auto)


 0.0 


 


 0.0-0.1


10^3/uL


 


Immature Granulocyte # (Auto)


 0.0 


 


 0.0-0.1


10^3/uL


 


Prothrombin Time 14.1   12.2-14.7  SEC


 


INR Comment 1.0   0.8-1.4  


 


Activated Partial


Thromboplast Time 42 H


 


 24-35  SEC





 


D-Dimer


 3.74 H


 


 0.00-0.49


UG/ML


 


Sodium Level 137   135-145  MMOL/L


 


Potassium Level 3.8   3.6-5.0  MMOL/L


 


Chloride Level 103     MMOL/L


 


Carbon Dioxide Level 22   21-32  MMOL/L


 


Anion Gap 12   5-14  MMOL/L


 


Blood Urea Nitrogen 23 H  7-18  MG/DL


 


Creatinine


 0.64 


 


 0.60-1.30


MG/DL


 


Estimat Glomerular Filtration


Rate > 60 


 


  





 


BUN/Creatinine Ratio 36    


 


Glucose Level 97     MG/DL


 


Lactic Acid Level


 0.80 


 


 0.50-2.00


MMOL/L


 


Calcium Level 8.6   8.5-10.1  MG/DL


 


Corrected Calcium 9.4   8.5-10.1  MG/DL


 


Total Bilirubin 0.3   0.1-1.0  MG/DL


 


Aspartate Amino Transf


(AST/SGOT) 27 


 


 5-34  U/L





 


Alanine Aminotransferase


(ALT/SGPT) 21 


 


 0-55  U/L





 


Alkaline Phosphatase 133     U/L


 


C-Reactive Protein High


Sensitivity 19.83 H


 


 0.00-0.50


MG/DL


 


Total Protein 7.7   6.4-8.2  GM/DL


 


Albumin 3.0 L  3.2-4.5  GM/DL


 


Procalcitonin 0.12 H  <0.10  NG/ML


 


Urine Color  STEVEN H  


 


Urine Clarity  CLEAR   


 


Urine pH  6.0  5-9  


 


Urine Specific Gravity  1.025 H 1.016-1.022  


 


Urine Protein  TRACE H NEGATIVE  


 


Urine Glucose (UA)  NEGATIVE  NEGATIVE  


 


Urine Ketones  2+ H NEGATIVE  


 


Urine Nitrite  NEGATIVE  NEGATIVE  


 


Urine Bilirubin  1+ H NEGATIVE  


 


Urine Urobilinogen  >=8.0  < = 1.0  MG/DL


 


Urine Leukocyte Esterase  NEGATIVE  NEGATIVE  


 


Urine RBC (Auto)  NEGATIVE  NEGATIVE  


 


Urine RBC  NONE   /HPF


 


Urine WBC  2-5   /HPF


 


Urine Crystals  PRESENT H  /LPF


 


Urine Amorphous Sediment


 


 FEW VIDAL


URATES H  /LPF





 


Urine Bacteria  TRACE   /HPF


 


Urine Casts  NONE   /LPF


 


Urine Mucus  LARGE H  /LPF


 


Urine Culture Indicated


 


 CULTURE


PENDING  











My Orders





Orders - NANCY BELLAMY MD


Cbc With Automated Diff (10/5/20 20:36)


Comprehensive Metabolic Panel (10/5/20 20:36)


Blood Culture (10/5/20 20:36)


Sputum Culture (10/5/20 20:36)


Urinalysis (10/5/20 20:36)


Urine Culture (10/5/20 20:36)


Protime With Inr (10/5/20 20:36)


Partial Thromboplastin Time (10/5/20 20:36)


Chest 1 View, Ap/Pa Only (10/5/20 20:36)


Ed Iv/Invasive Line Start (10/5/20 20:36)


Vital Signs Adult Sepsis Patie Q15M (10/5/20 20:36)


O2 (10/5/20 20:36)


Remove Rings In Anticipation O (10/5/20 20:36)


Lactic Acid Analyzer (10/5/20 20:36)


Procalcitonin (Pct) (10/5/20 20:36)


Hs C Reactive Protein (10/5/20 20:36)


Fibrin Degradation Products (10/5/20 20:36)


Ed Iv/Invasive Line Start (10/5/20 20:36)


Lactated Ringers (Lr 1000 Ml Iv Solution (10/5/20 20:36)


Catheter(Urinary) Insert & Ass 03,15 (10/5/20 20:37)


Cefepime Injection (Maxipime Injection) (10/5/20 21:45)


Dexamethasone Injection (Decadron Inje (10/5/20 22:00)





Medications Given in ED





Current Medications








 Medications  Dose


 Ordered  Sig/Carlos


 Route  Start Time


 Stop Time Status Last Admin


Dose Admin


 


 Cefepime HCl 1000


 mg/Sterile Water  10 ml @ 


 200 mls/hr  ONCE  ONCE


 IV  10/5/20 21:45


 10/5/20 21:47 DC 10/5/20 22:03


200 MLS/HR


 


 Dexamethasone


 Sodium Phosphate  6 mg  ONCE  ONCE


 IV  10/5/20 22:00


 10/5/20 22:01 DC 10/5/20 22:03


6 MG


 


 Lactated Ringer's  1,000 ml @ 


 0 mls/hr  Q0M ONCE


 IV  10/5/20 20:36


 10/5/20 20:38 DC 10/5/20 21:03


1,000 MLS/HR








Vital Signs/I&O











 10/5/20 10/5/20 10/5/20





 20:28 20:39 20:57


 


Temp   37.8


 


Pulse 85 85 


 


Resp 17 17 


 


B/P (MAP) 141/88 (105) 141/88 (105) 


 


Pulse Ox 93 93 


 


O2 Delivery Room Air Room Air 





Capillary Refill : Less Than 3 Seconds








Blood Pressure Mean:                    105








Progress Note :  


Progress Note


Seen and evaluated. IV, labs, UA, chest x-ray, LR 1 L bolus, Andrew catheter 

ordered. Patient is known COVID positive. We will evaluate for pneumonia. 

Patient likely will need admission due to underlying medical condition and COVID

status with worsening. Monitor patient.





Diagnostic Imaging





   Diagonstic Imaging:  Xray


   Plain Films/CT/US/NM/MRI:  chest


Comments


NAME:   ROMULO HENDERSON


[facility] REC#:   [id number]


ACCOUNT#:   [id number]


PT STATUS:   REG ER


:   1960


PHYSICIAN:   NANCY BELLAMY MD


ADMIT DATE:   10/05/20/ER


                                   ***Draft***


Date of Exam:10/05/20





CHEST 1 VIEW, AP/PA ONLY








EXAMINATION: Chest 1 view





HISTORY: Sepsis. Covid positive.





COMPARISON: 2020.





FINDINGS: 





Patchy and hazy opacities are seen throughout the left


hemithorax, greatest in the left perihilar region. A few


scattered patchy opacities are seen in the right lung base. No


large pleural effusion or pneumothorax. Cardiac silhouette is


stable. No acute osseous abnormalities.





IMPRESSION: 





1. Opacities throughout the left hemithorax with scattered


opacities in the right lung base. These findings are consistent


with a history of infection. The asymmetric appearance may


represent superimposed atelectasis or edema.





  Dictated on workstation # [id number]








Dict:   10/05/20 [id number]


Trans:   10/05/20 2126


[facility] [id number]





Interpreted by:     SHASTA MONTANA DO


Electronically signed by:





Departure


Communication (Admissions)


Time/Spoke to Admitting Phy:  21:48





Impression





   Primary Impression:  


   Pneumonia due to COVID-19 virus


   Additional Impression:  


   Dehydration


Disposition:   ADMITTED AS INPATIENT


Condition:  Stable





Admissions


Decision to Admit Reason:  Admit from ER (General)


Decision to Admit/Date:  Oct 5, 2020


Time/Decision to Admit Time:  21:48





Departure-Patient Inst.


Referrals:  


CORNELIUS SHANE DO (PCP/Family)


Primary Care Physician











NANCY BELLAMY MD           Oct 5, 2020 20:49

## 2020-10-05 NOTE — DIAGNOSTIC IMAGING REPORT
EXAMINATION: Chest 1 view



HISTORY: Sepsis. Covid positive.



COMPARISON: 08/23/2020.



FINDINGS: 



Patchy and hazy opacities are seen throughout the left

hemithorax, greatest in the left perihilar region. A few

scattered patchy opacities are seen in the right lung base. No

large pleural effusion or pneumothorax. Cardiac silhouette is

stable. No acute osseous abnormalities.



IMPRESSION: 



1. Opacities throughout the left hemithorax with scattered

opacities in the right lung base. These findings are consistent

with a history of infection. The asymmetric appearance may

represent superimposed atelectasis or edema.



Dictated by: 



  Dictated on workstation # [id number]

## 2020-10-06 VITALS — DIASTOLIC BLOOD PRESSURE: 74 MMHG | SYSTOLIC BLOOD PRESSURE: 115 MMHG

## 2020-10-06 VITALS — SYSTOLIC BLOOD PRESSURE: 110 MMHG | DIASTOLIC BLOOD PRESSURE: 65 MMHG

## 2020-10-06 VITALS — SYSTOLIC BLOOD PRESSURE: 102 MMHG | DIASTOLIC BLOOD PRESSURE: 64 MMHG

## 2020-10-06 VITALS — DIASTOLIC BLOOD PRESSURE: 65 MMHG | SYSTOLIC BLOOD PRESSURE: 120 MMHG

## 2020-10-06 VITALS — SYSTOLIC BLOOD PRESSURE: 132 MMHG | DIASTOLIC BLOOD PRESSURE: 78 MMHG

## 2020-10-06 LAB
%HYPO/RBC NFR BLD AUTO: (no result) %
ALBUMIN SERPL-MCNC: 2.8 GM/DL (ref 3.2–4.5)
ALP SERPL-CCNC: 116 U/L (ref 40–136)
ALT SERPL-CCNC: 23 U/L (ref 0–55)
BASOPHILS # BLD AUTO: 0 10^3/UL (ref 0–0.1)
BASOPHILS NFR BLD AUTO: 0 % (ref 0–10)
BILIRUB SERPL-MCNC: 0.2 MG/DL (ref 0.1–1)
BUN/CREAT SERPL: 40
CALCIUM SERPL-MCNC: 8.4 MG/DL (ref 8.5–10.1)
CHLORIDE SERPL-SCNC: 105 MMOL/L (ref 98–107)
CO2 SERPL-SCNC: 21 MMOL/L (ref 21–32)
CREAT SERPL-MCNC: 0.63 MG/DL (ref 0.6–1.3)
EOSINOPHIL # BLD AUTO: 0 10^3/UL (ref 0–0.3)
EOSINOPHIL NFR BLD AUTO: 0 % (ref 0–10)
GFR SERPLBLD BASED ON 1.73 SQ M-ARVRAT: > 60 ML/MIN
GLUCOSE SERPL-MCNC: 130 MG/DL (ref 70–105)
HCT VFR BLD CALC: 32 % (ref 40–54)
HGB BLD-MCNC: 10.4 G/DL (ref 13.3–17.7)
LYMPHOCYTES # BLD AUTO: 0.3 10^3/UL (ref 1–4)
LYMPHOCYTES NFR BLD AUTO: 5 % (ref 12–44)
MANUAL DIFFERENTIAL PERFORMED BLD QL: YES
MCH RBC QN AUTO: 27 PG (ref 25–34)
MCHC RBC AUTO-ENTMCNC: 32 G/DL (ref 32–36)
MCV RBC AUTO: 85 FL (ref 80–99)
MONOCYTES # BLD AUTO: 0.2 10^3/UL (ref 0–1)
MONOCYTES NFR BLD AUTO: 4 % (ref 0–12)
MONOCYTES NFR BLD: 5 %
NEUTROPHILS # BLD AUTO: 4.4 10^3/UL (ref 1.8–7.8)
NEUTROPHILS NFR BLD AUTO: 91 % (ref 42–75)
NEUTS BAND NFR BLD MANUAL: 90 %
NEUTS BAND NFR BLD: 3 %
PLATELET # BLD: 280 10^3/UL (ref 130–400)
PMV BLD AUTO: 10.8 FL (ref 9–12.2)
POTASSIUM SERPL-SCNC: 3.8 MMOL/L (ref 3.6–5)
PROT SERPL-MCNC: 7 GM/DL (ref 6.4–8.2)
SODIUM SERPL-SCNC: 137 MMOL/L (ref 135–145)
VARIANT LYMPHS NFR BLD MANUAL: 2 %
WBC # BLD AUTO: 4.8 10^3/UL (ref 4.3–11)

## 2020-10-06 RX ADMIN — SODIUM CHLORIDE SCH MLS/HR: 900 INJECTION INTRAVENOUS at 13:15

## 2020-10-06 RX ADMIN — ENOXAPARIN SODIUM SCH MG: 100 INJECTION SUBCUTANEOUS at 13:15

## 2020-10-06 RX ADMIN — SODIUM CHLORIDE SCH MLS/HR: 900 INJECTION INTRAVENOUS at 20:38

## 2020-10-06 RX ADMIN — SODIUM CHLORIDE, SODIUM LACTATE, POTASSIUM CHLORIDE, AND CALCIUM CHLORIDE SCH MLS/HR: 600; 310; 30; 20 INJECTION, SOLUTION INTRAVENOUS at 13:15

## 2020-10-06 NOTE — HISTORY & PHYSICAL-HOSPITALIST
History of Present Illness


HPI/Chief Complaint


Pt is a 59yoCM well known to me from recent admission who presented to the ER 

due to fever and known COVID19 diagnosis. He is quite demented and unable to 

provide any history. All history is obtained from the records. It is unclear 

when he tested positive but it was within the last few days and he developed a 

fever at the NH and was brought to the ER for evaluation. Fortunately he was not

on any oxygen and CXR showed questionable pneumonia. He was admitted overnight 

and this morning is near his baseline mentation. He opened his eyes and mumbled 

at me and I believe he tried to say "feeling better." This is near his baseline 

from his previous admissions though not 100% there.


Source:  patient


Date Seen


10/6/20


Time Seen by a Provider:  12:26


Attending Physician


Brett Jacobs MD


PCP


Ric Putnam DO


Referring Physician





Date of Admission


Oct 5, 2020 at 22:04





Home Medications & Allergies


Home Medications


Reviewed patient Home Medication Reconciliation performed by pharmacy medication

reconciliations technician and/or nursing.


Patients Allergies have been reviewed.





Allergies





Allergies


Coded Allergies


  No Known Drug Allergies (Unverified18)








Past Medical-Social-Family Hx


Past Med/Social Hx:  Reviewed Nursing Past Med/Soc Hx


Patient Social History


Employed/Student:  retired


Alcohol Use:  Denies Use


Recreational Drug Use:  No


2nd Hand Smoke Exposure:  No


Recent Foreign Travel:  No


Contact w/other who traveled:  No


Recent Hopitalizations:  No (unknown)


Recent Infectious Disease Expo:  Yes





Immunizations Up To Date


Tetanus Booster (TDap):  Unknown


Date of Pneumonia Vaccine:  Dec 1, 2019


Date of Influenza Vaccine:  Oct 1, 2019





Seasonal Allergies


Seasonal Allergies:  No





Past Medical History


Neurological:  Dementia, Parkinson's Disease


Gastrointestinal:  Chronic Constipation


Psychosocial:  Schizophrenia, Depression


History of Blood Disorders:  No


Adverse Reaction to Blood [place]:  No





Family History


Reviewed Nursing Family Hx


No Pertinent Family Hx





Review of Systems


ROS-Unable to Obtain:  dementia


Constitutional:  see HPI





Physical Exam


Physical Exam


Vital Signs





Vital Signs - First Documented








 10/5/20 10/5/20





 20:28 20:57


 


Temp  37.8


 


Pulse 85 


 


Resp 17 


 


B/P (MAP) 141/88 (105) 


 


Pulse Ox 93 


 


O2 Delivery Room Air 





Capillary Refill : Less Than 3 Seconds


Height, Weight, BMI


Height: 5'8.00"


Weight: 183lbs. 3.0oz. 83.882094sr; 21.64 BMI


Method:Estimated


General Appearance:  No Apparent Distress, Chronically ill


HEENT:  Moist Mucous Membranes


Neck:  Normal Inspection, Supple


Respiratory:  Lungs Clear, No Respiratory Distress, Decreased Breath Sounds (in 

bases and he did not take deep breaths, no crackles or wheezing audible )


Cardiovascular:  Regular Rate, Rhythm, No Murmur, Normal Peripheral Pulses


Gastrointestinal:  Normal Bowel Sounds, Non Tender, Soft


Extremity:  No Calf Tenderness, No Pedal Edema


Neurologic/Psychiatric:  Alert, Aphasia, Disoriented


Skin:  Normal Color, Warm/Dry





Results


Results/Procedures


Labs


Laboratory Tests


10/5/20 20:30








10/6/20 05:15








Patient resulted labs reviewed.


Imaging:  Reviewed Imaging Report


Imaging


                 [facility], [place]





NAME:   ROMULO HENDERSON


MED REC#:   [id number]


ACCOUNT#:   [id number]


PT STATUS:   REG ER


:   1960


PHYSICIAN:   NANCY BELLAMY MD


ADMIT DATE:   10/05/20/ER


                                  ***Signed***


Date of Exam:10/05/20





CHEST 1 VIEW, AP/PA ONLY








EXAMINATION: Chest 1 view





HISTORY: Sepsis. Covid positive.





COMPARISON: 2020.





FINDINGS: 





Patchy and hazy opacities are seen throughout the left


hemithorax, greatest in the left perihilar region. A few


scattered patchy opacities are seen in the right lung base. No


large pleural effusion or pneumothorax. Cardiac silhouette is


stable. No acute osseous abnormalities.





IMPRESSION: 





1. Opacities throughout the left hemithorax with scattered


opacities in the right lung base. These findings are consistent


with a history of infection. The asymmetric appearance may


represent superimposed atelectasis or edema.





Dictated by: 





  Dictated on workstation # [id number]








Dict:   10/05/20 [id number]


Trans:   10/05/20 2127


[facility] [id number]





Interpreted by:     SHASTA MONTANA DO


Electronically signed by: SHASTA MONTANA DO 10/05/20 2127





Assessment/Plan


Admission Diagnosis


COVID19


Admission Status:  Inpatient Order (span 2 midnights)


Reason for Inpatient Admission:  


see below





Assessment and Plan


COVID19


PNA


   Not currently on oxygen- not a candidate for Remdesivir at this time


   Discussed with brother who is DPOA about convalescent plasma and EUA status- 

brother agreeable to treatment


   Continue Cefepime for pneumonia- does not meet sepsis criteria


   Continue decadron


   IS ordered


   Lovenox for DVT ppx


   High risk for decompensation given multiple comorbidities and poor functional

status at baseline, discussed with brother my concern about this as well and he 

expressed understanding





Parkinson's Dementia


Dysphagia


   Dysphagia diet if able to tolerate, if not speech eval


   Resume home meds when med rec available and able to take PO


   


HTN


   Continue home meds when med rec done





DVT ppx: Lovenox





Diagnosis/Problems


Diagnosis/Problems





(1) Pneumonia due to COVID-19 virus


Status:  Acute


(2) Dehydration


Status:  Acute


(3) Essential (primary) hypertension


(4) Dementia


Qualifiers:  


   Dementia type:  Parkinson's disease  Dementia behavioral disturbance:  

without behavioral disturbance  Qualified Codes:  G20 - Parkinson's disease; 

F02.80 - Dementia in other diseases classified elsewhere without behavioral 

disturbance


(5) Advanced dementia


(6) Dysphagia


(7) Poor prognosis





Clinical Quality Measures


DVT/VTE Risk/Contraindication:


Risk Factor Score Per Nursin


RFS Level Per Nursing on Admit:  4+=Very High











DEON CALL MD               Oct 6, 2020 12:31

## 2020-10-07 VITALS — SYSTOLIC BLOOD PRESSURE: 150 MMHG | DIASTOLIC BLOOD PRESSURE: 84 MMHG

## 2020-10-07 VITALS — SYSTOLIC BLOOD PRESSURE: 152 MMHG | DIASTOLIC BLOOD PRESSURE: 60 MMHG

## 2020-10-07 VITALS — DIASTOLIC BLOOD PRESSURE: 63 MMHG | SYSTOLIC BLOOD PRESSURE: 142 MMHG

## 2020-10-07 VITALS — DIASTOLIC BLOOD PRESSURE: 93 MMHG | SYSTOLIC BLOOD PRESSURE: 149 MMHG

## 2020-10-07 VITALS — DIASTOLIC BLOOD PRESSURE: 78 MMHG | SYSTOLIC BLOOD PRESSURE: 138 MMHG

## 2020-10-07 VITALS — SYSTOLIC BLOOD PRESSURE: 140 MMHG | DIASTOLIC BLOOD PRESSURE: 70 MMHG

## 2020-10-07 VITALS — SYSTOLIC BLOOD PRESSURE: 140 MMHG | DIASTOLIC BLOOD PRESSURE: 72 MMHG

## 2020-10-07 VITALS — SYSTOLIC BLOOD PRESSURE: 144 MMHG | DIASTOLIC BLOOD PRESSURE: 81 MMHG

## 2020-10-07 RX ADMIN — SODIUM CHLORIDE, SODIUM LACTATE, POTASSIUM CHLORIDE, AND CALCIUM CHLORIDE SCH MLS/HR: 600; 310; 30; 20 INJECTION, SOLUTION INTRAVENOUS at 02:11

## 2020-10-07 RX ADMIN — SODIUM CHLORIDE SCH MLS/HR: 900 INJECTION INTRAVENOUS at 06:50

## 2020-10-07 RX ADMIN — SODIUM CHLORIDE SCH MLS/HR: 900 INJECTION INTRAVENOUS at 02:11

## 2020-10-07 RX ADMIN — CARBIDOPA AND LEVODOPA SCH EA: 25; 100 TABLET ORAL at 21:30

## 2020-10-07 RX ADMIN — SODIUM CHLORIDE, SODIUM LACTATE, POTASSIUM CHLORIDE, AND CALCIUM CHLORIDE SCH MLS/HR: 600; 310; 30; 20 INJECTION, SOLUTION INTRAVENOUS at 17:48

## 2020-10-07 RX ADMIN — SODIUM CHLORIDE SCH MLS/HR: 900 INJECTION INTRAVENOUS at 13:18

## 2020-10-07 RX ADMIN — DIVALPROEX SODIUM SCH MG: 125 CAPSULE ORAL at 21:29

## 2020-10-07 RX ADMIN — SODIUM CHLORIDE SCH MLS/HR: 900 INJECTION INTRAVENOUS at 20:22

## 2020-10-07 RX ADMIN — ENOXAPARIN SODIUM SCH MG: 100 INJECTION SUBCUTANEOUS at 13:18

## 2020-10-07 NOTE — DIAGNOSTIC IMAGING REPORT
PROCEDURE: CT angiography of the chest with contrast.



TECHNIQUE: Multiple contiguous axial images were obtained through

the chest after uneventful bolus administration of intravenous

contrast. 3D reconstructed CTA MIP acquisitions were also

performed.

Auto Exposure Controls were utilized during the CT exam to meet

ALARA standards for radiation dose reduction. 

 

INDICATION: Positive d-dimer, Covid positive patient, shortness

of breath. 



COMPARISON: Chest CT from 08/24/2020.



FINDINGS: Motion degradation artifact particularly limiting the

peripheral pulmonary arterial branches. The adequately

interrogated proximal vessels show no filling defect. No

identifiable PE. No findings of right heart strain or features

suggestive of an elevation of the right heart pressures. No

intracardiac chamber mass or thrombus. The aorta is patent and

nonaneurysmal. The left pleural effusion has decreased in volume

from prior. The pericardial effusion nearly completely resolved.

Dependent left lower lobe consolidative changes adjacent to the

left pleural effusion have improved. The upper lobe shows

scattered groundglass opacities, more pronounced than on prior

and while nonspecific this infiltrative pattern is congruent with

the provided history of Covid positivity. No pneumothorax. No

acute chest wall pathology. The visualized upper abdomen shows no

acute finding. 



IMPRESSION:

1. Mixed changes, progressive and new, predominantly upper lobe

groundglass infiltrates; however, left pleural effusion and

subjacent lower lobe atelectasis have improved and there has been

near resolution of now minimal pericardial effusion. 

2. Limited evaluation of the pulmonary arterial branches owing to

motion. No appreciable PE. 



Dictated by: 



  Dictated on workstation # [id number]

## 2020-10-07 NOTE — NUR
CM/SS following with patient for discharge planning. 



Plan: The patient will return to [facility] long-term at time of discharge. 



[facility]: CM/SS contacted Glendy at the facility to give an update on the 
patient and discuss possible discharge for Friday the 9th. She verbalized understanding. 
CM/SS faxed updated clinical to facility. 



DPOA: CM/SS contacted Kevin to give an update. CM/SS informed him that patient may 
discharge on Friday back to facility. He verbalized understanding. Kevin did not have any 
further questions at this time. 



CM/SS will continue to follow.

## 2020-10-07 NOTE — NUR
I CALLED [facility], WENT THROUGH THE MED LIST PROVIDED FROM THEM AND WENT 
THROUGH THE EXTERNAL MED HISTORY TO COMPLETE THIS MED REC.



OTC:

TYLENOL

MIRALAX

DOCUSATE SODIUM

ASPIRIN

MILK OF MAG

ENEMA

FAMOTIDINE

BISACODYL SUPP

## 2020-10-07 NOTE — SPEECH THERAPY PROGRESS NOTE
Therapy Progress Note


ST to discharge speech order due to patient being at baseline as known to me 

from previous interaction at [facility] facility. Patient is non verbal and unable to 

participate or improve at this time.











LENORE PERSAUD             Oct 7, 2020 08:16

## 2020-10-07 NOTE — PROGRESS NOTE - HOSPITALIST
Subjective


HPI/CC On Admission


Date Seen by Provider:  Oct 7, 2020


Time Seen by Provider:  12:20


Pt is a 59yoCM well known to me from recent admission who presented to the ER 

due to fever and known COVID19 diagnosis. He is quite demented and unable to 

provide any history. All history is obtained from the records. It is unclear 

when he tested positive but it was within the last few days and he developed a 

fever at the NH and was brought to the ER for evaluation. Fortunately he was not

on any oxygen and CXR showed questionable pneumonia. He was admitted overnight 

and this morning is near his baseline mentation. He opened his eyes and mumbled 

at me and I believe he tried to say "feeling better." This is near his baseline 

from his previous admissions though not 100% there.


Subjective/Events-last exam


Pt remains near his baseline mentation. RN states he has done well overnight. 

Still not requiring oxygen.





Focused Exam


Lactate Level


10/5/20 20:30: Lactic Acid Level 0.80








Objective


Exam


Vital Signs





Vital Signs








  Date Time  Temp Pulse Resp B/P (MAP) Pulse Ox O2 Delivery O2 Flow Rate FiO2


 


10/7/20 11:26 36.1 69 18 140/72 (94) 96 Room Air  





Capillary Refill : Less Than 3 Seconds


General Appearance:  No Apparent Distress, Chronically ill


Respiratory:  Lungs Clear, No Respiratory Distress


Cardiovascular:  Regular Rate, Rhythm, No Murmur


Gastrointestinal:  Normal Bowel Sounds, Non Tender, Soft


Neurologic/Psychiatric:  Alert, Aphasia (through), Disoriented





Results/Procedures


Lab


Patient resulted labs reviewed.


Imaging:  Reviewed Imaging Report





Assessment/Plan


Assessment and Plan


Assess & Plan/Chief Complaint


COVID19


PNA


   Not currently on oxygen- not a candidate for Remdesivir at this time


   Discussed with brother 10/6 who is DPOA about convalescent plasma and EUA 

status- brother agreeable to treatment, pending arrival


   Continue Cefepime for pneumonia- does not meet sepsis criteria


   Continue decadron


   IS ordered


   Lovenox for DVT ppx, D-dimer elevated so CTA ordered


   High risk for decompensation given multiple comorbidities and poor functional

status at baseline, discussed with brother my concern about this as well and he 

expressed understanding





Parkinson's Dementia


Dysphagia


   Speech consulted


   Resume home meds when med rec available and able to take PO


   


HTN


   Continue home meds when med rec done





DVT ppx: Lovenoxhe is covered





Diagnosis/Problems


Diagnosis/Problems





(1) Pneumonia due to COVID-19 virus


Status:  Acute


(2) Dehydration


Status:  Acute


(3) Essential (primary) hypertension


(4) Dementia


Qualifiers:  


   Dementia type:  Parkinson's disease  Dementia behavioral disturbance:  

without behavioral disturbance  Qualified Codes:  G20 - Parkinson's disease; 

F02.80 - Dementia in other diseases classified elsewhere without behavioral 

disturbance


(5) Advanced dementia


(6) Dysphagia


(7) Poor prognosis





Clinical Quality Measures


DVT/VTE Risk/Contraindication:


Risk Factor Score Per Nursin


RFS Level Per Nursing on Admit:  4+=Very High











DEON CALL MD               Oct 7, 2020 12:27

## 2020-10-07 NOTE — NUR
Sent a message to Dr. Pathak informing her that I am unable to arouse the patient enough to 
even sip water therefore I am unable to administer his oral medications. I let her know that 
patient has a temperature of 101.4 and that I will be administering the Tylenol suppository. 
No response at this time.

## 2020-10-08 VITALS — DIASTOLIC BLOOD PRESSURE: 61 MMHG | SYSTOLIC BLOOD PRESSURE: 111 MMHG

## 2020-10-08 VITALS — DIASTOLIC BLOOD PRESSURE: 69 MMHG | SYSTOLIC BLOOD PRESSURE: 121 MMHG

## 2020-10-08 VITALS — SYSTOLIC BLOOD PRESSURE: 121 MMHG | DIASTOLIC BLOOD PRESSURE: 64 MMHG

## 2020-10-08 VITALS — DIASTOLIC BLOOD PRESSURE: 59 MMHG | SYSTOLIC BLOOD PRESSURE: 128 MMHG

## 2020-10-08 VITALS — DIASTOLIC BLOOD PRESSURE: 68 MMHG | SYSTOLIC BLOOD PRESSURE: 129 MMHG

## 2020-10-08 VITALS — SYSTOLIC BLOOD PRESSURE: 125 MMHG | DIASTOLIC BLOOD PRESSURE: 63 MMHG

## 2020-10-08 VITALS — DIASTOLIC BLOOD PRESSURE: 64 MMHG | SYSTOLIC BLOOD PRESSURE: 120 MMHG

## 2020-10-08 RX ADMIN — SODIUM CHLORIDE SCH MLS/HR: 900 INJECTION INTRAVENOUS at 12:43

## 2020-10-08 RX ADMIN — DIVALPROEX SODIUM SCH MG: 125 CAPSULE ORAL at 08:42

## 2020-10-08 RX ADMIN — SODIUM CHLORIDE SCH MLS/HR: 900 INJECTION INTRAVENOUS at 06:20

## 2020-10-08 RX ADMIN — SODIUM CHLORIDE SCH MLS/HR: 900 INJECTION INTRAVENOUS at 01:24

## 2020-10-08 RX ADMIN — ENOXAPARIN SODIUM SCH MG: 100 INJECTION SUBCUTANEOUS at 12:44

## 2020-10-08 RX ADMIN — SODIUM CHLORIDE, SODIUM LACTATE, POTASSIUM CHLORIDE, AND CALCIUM CHLORIDE SCH MLS/HR: 600; 310; 30; 20 INJECTION, SOLUTION INTRAVENOUS at 21:25

## 2020-10-08 RX ADMIN — SODIUM CHLORIDE SCH MLS/HR: 900 INJECTION INTRAVENOUS at 19:44

## 2020-10-08 RX ADMIN — CARBIDOPA AND LEVODOPA SCH EA: 25; 100 TABLET ORAL at 13:18

## 2020-10-08 RX ADMIN — POLYETHYLENE GLYCOL (3350) SCH GM: 17 POWDER, FOR SOLUTION ORAL at 08:42

## 2020-10-08 RX ADMIN — RISPERIDONE SCH MG: 0.25 TABLET, FILM COATED ORAL at 08:42

## 2020-10-08 RX ADMIN — FAMOTIDINE SCH MG: 20 TABLET, FILM COATED ORAL at 08:42

## 2020-10-08 RX ADMIN — CARBIDOPA AND LEVODOPA SCH EA: 25; 100 TABLET ORAL at 21:24

## 2020-10-08 RX ADMIN — CARBIDOPA AND LEVODOPA SCH EA: 25; 100 TABLET ORAL at 08:41

## 2020-10-08 RX ADMIN — ASPIRIN 81 MG CHEWABLE TABLET SCH MG: 81 TABLET CHEWABLE at 08:41

## 2020-10-08 RX ADMIN — Medication SCH MG: at 08:41

## 2020-10-08 RX ADMIN — PAROXETINE HYDROCHLORIDE SCH MG: 20 TABLET, FILM COATED ORAL at 08:42

## 2020-10-08 RX ADMIN — DIVALPROEX SODIUM SCH MG: 125 CAPSULE ORAL at 12:44

## 2020-10-08 RX ADMIN — DIVALPROEX SODIUM SCH MG: 125 CAPSULE ORAL at 21:24

## 2020-10-08 NOTE — NUR
CM/SS follow up. 



Plan: Patient will discharge to [facility] alf tomorrow at 3:30 p.m. 



CM/SS contacted Glendy from Facility to set up  time. No further questions at this time. 




CM/SS contacted the patient's brother and POGHULAM Brown to give an update and the time of 
planned discharge. He verbalized understanding. No further questions or needs at this time.

## 2020-10-08 NOTE — PROGRESS NOTE - HOSPITALIST
Subjective


HPI/CC On Admission


Date Seen by Provider:  Oct 8, 2020


Time Seen by Provider:  11:01


Pt is a 59yoCM well known to me from recent admission who presented to the ER 

due to fever and known COVID19 diagnosis. He is quite demented and unable to 

provide any history. All history is obtained from the records. It is unclear 

when he tested positive but it was within the last few days and he developed a 

fever at the NH and was brought to the ER for evaluation. Fortunately he was not

on any oxygen and CXR showed questionable pneumonia. He was admitted overnight 

and this morning is near his baseline mentation. He opened his eyes and mumbled 

at me and I believe he tried to say "feeling better." This is near his baseline 

from his previous admissions though not 100% there.


Subjective/Events-last exam


Pt remains at baseline mentation. He did open his eyes and attempt to speak but 

it was all garbled. I spoke with brother about progress and plan to DC tomorrow 

if continue to do well.





Focused Exam


Lactate Level


10/5/20 20:30: Lactic Acid Level 0.80








Objective


Exam


Vital Signs





Vital Signs








  Date Time  Temp Pulse Resp B/P (MAP) Pulse Ox O2 Delivery O2 Flow Rate FiO2


 


10/8/20 09:00      Room Air  


 


10/8/20 08:00 34.8 64 16 129/68 (88) 93   





Capillary Refill : Less Than 3 Seconds


General Appearance:  No Apparent Distress, Chronically ill


Respiratory:  No Respiratory Distress, Decreased Breath Sounds


Cardiovascular:  Regular Rate, Rhythm, No Murmur


Gastrointestinal:  Normal Bowel Sounds, Non Tender, Soft


Neurologic/Psychiatric:  Disoriented, Other (opens eyes when I speak to him, 

mumbles, otherwise does not really respond)





Results/Procedures


Lab


Patient resulted labs reviewed.


Imaging:  Reviewed Imaging Report





Assessment/Plan


Assessment and Plan


Assess & Plan/Chief Complaint


COVID19


PNA


   Not currently on oxygen- not a candidate for Remdesivir at this time


   Discussed with brother 10/6 who is DPOA about convalescent plasma and EUA 

status- brother agreeable to treatment, received 10/7


   Continue Cefepime for pneumonia- does not meet sepsis criteria


   Continue decadron


   IS ordered


   Lovenox for DVT ppx, D-dimer elevated so CTA ordered


   High risk for decompensation given multiple comorbidities and poor functional

status at baseline, discussed with brother my concern about this as well and he 

expressed understanding, also discussed today about concern for worsening even 

after discharge given his history with recurrent aspiration pneumonia, brother 

again expressed understanding





Parkinson's Dementia


Dysphagia


   Speech consulted


   Resume home meds


   


HTN


   Continue home meds





DVT ppx: Lovenox





Diagnosis/Problems


Diagnosis/Problems





(1) Pneumonia due to COVID-19 virus


Status:  Acute


(2) Dehydration


Status:  Acute


(3) Essential (primary) hypertension


(4) Dementia


Qualifiers:  


   Dementia type:  Parkinson's disease  Dementia behavioral disturbance:  

without behavioral disturbance  Qualified Codes:  G20 - Parkinson's disease; 

F02.80 - Dementia in other diseases classified elsewhere without behavioral d

isturbance


(5) Advanced dementia


(6) Dysphagia


(7) Poor prognosis





Clinical Quality Measures


DVT/VTE Risk/Contraindication:


Risk Factor Score Per Nursin


RFS Level Per Nursing on Admit:  4+=Very High











DEON CALL MD               Oct 8, 2020 11:08

## 2020-10-09 ENCOUNTER — HOSPITAL ENCOUNTER (EMERGENCY)
Dept: HOSPITAL 75 - ER | Age: 60
Discharge: NURSING, REHAB OR LONG-TERM CARE | End: 2020-10-09
Payer: MEDICAID

## 2020-10-09 VITALS — WEIGHT: 156.53 LBS | HEIGHT: 71.65 IN | BODY MASS INDEX: 21.43 KG/M2

## 2020-10-09 VITALS — SYSTOLIC BLOOD PRESSURE: 118 MMHG | DIASTOLIC BLOOD PRESSURE: 66 MMHG

## 2020-10-09 VITALS — SYSTOLIC BLOOD PRESSURE: 120 MMHG | DIASTOLIC BLOOD PRESSURE: 64 MMHG

## 2020-10-09 VITALS — DIASTOLIC BLOOD PRESSURE: 66 MMHG | SYSTOLIC BLOOD PRESSURE: 118 MMHG

## 2020-10-09 VITALS — SYSTOLIC BLOOD PRESSURE: 117 MMHG | DIASTOLIC BLOOD PRESSURE: 77 MMHG

## 2020-10-09 VITALS — SYSTOLIC BLOOD PRESSURE: 129 MMHG | DIASTOLIC BLOOD PRESSURE: 72 MMHG

## 2020-10-09 VITALS — DIASTOLIC BLOOD PRESSURE: 69 MMHG | SYSTOLIC BLOOD PRESSURE: 133 MMHG

## 2020-10-09 DIAGNOSIS — F20.9: ICD-10-CM

## 2020-10-09 DIAGNOSIS — F41.9: ICD-10-CM

## 2020-10-09 DIAGNOSIS — F03.90: ICD-10-CM

## 2020-10-09 DIAGNOSIS — Z79.82: ICD-10-CM

## 2020-10-09 DIAGNOSIS — J12.89: ICD-10-CM

## 2020-10-09 DIAGNOSIS — F32.9: ICD-10-CM

## 2020-10-09 DIAGNOSIS — U07.1: Primary | ICD-10-CM

## 2020-10-09 DIAGNOSIS — G20: ICD-10-CM

## 2020-10-09 DIAGNOSIS — I10: ICD-10-CM

## 2020-10-09 LAB
ALBUMIN SERPL-MCNC: 2.8 GM/DL (ref 3.2–4.5)
ALP SERPL-CCNC: 122 U/L (ref 40–136)
ALT SERPL-CCNC: 20 U/L (ref 0–55)
ANISOCYTOSIS BLD QL SMEAR: SLIGHT
APTT BLD: 40 SEC (ref 24–35)
BASOPHILS # BLD AUTO: 0 10^3/UL (ref 0–0.1)
BASOPHILS NFR BLD AUTO: 0 % (ref 0–10)
BASOPHILS NFR BLD MANUAL: 0 %
BILIRUB SERPL-MCNC: 0.6 MG/DL (ref 0.1–1)
BUN/CREAT SERPL: 30
CALCIUM SERPL-MCNC: 8.2 MG/DL (ref 8.5–10.1)
CHLORIDE SERPL-SCNC: 104 MMOL/L (ref 98–107)
CO2 SERPL-SCNC: 21 MMOL/L (ref 21–32)
CREAT SERPL-MCNC: 0.56 MG/DL (ref 0.6–1.3)
EOSINOPHIL # BLD AUTO: 0 10^3/UL (ref 0–0.3)
EOSINOPHIL NFR BLD AUTO: 0 % (ref 0–10)
EOSINOPHIL NFR BLD MANUAL: 0 %
GFR SERPLBLD BASED ON 1.73 SQ M-ARVRAT: > 60 ML/MIN
GLUCOSE SERPL-MCNC: 109 MG/DL (ref 70–105)
HCT VFR BLD CALC: 33 % (ref 40–54)
HGB BLD-MCNC: 10.5 G/DL (ref 13.3–17.7)
INR PPP: 1.2 (ref 0.8–1.4)
LYMPHOCYTES # BLD AUTO: 0.5 10^3/UL (ref 1–4)
LYMPHOCYTES NFR BLD AUTO: 6 % (ref 12–44)
MAGNESIUM SERPL-MCNC: 1.8 MG/DL (ref 1.6–2.4)
MANUAL DIFFERENTIAL PERFORMED BLD QL: YES
MCH RBC QN AUTO: 27 PG (ref 25–34)
MCHC RBC AUTO-ENTMCNC: 32 G/DL (ref 32–36)
MCV RBC AUTO: 83 FL (ref 80–99)
MONOCYTES # BLD AUTO: 0.5 10^3/UL (ref 0–1)
MONOCYTES NFR BLD AUTO: 5 % (ref 0–12)
MONOCYTES NFR BLD: 5 %
NEUTROPHILS # BLD AUTO: 8.1 10^3/UL (ref 1.8–7.8)
NEUTROPHILS NFR BLD AUTO: 88 % (ref 42–75)
NEUTS BAND NFR BLD MANUAL: 90 %
NEUTS BAND NFR BLD: 0 %
PLATELET # BLD: 348 10^3/UL (ref 130–400)
PMV BLD AUTO: 10.7 FL (ref 9–12.2)
POTASSIUM SERPL-SCNC: 3.5 MMOL/L (ref 3.6–5)
PROT SERPL-MCNC: 7.2 GM/DL (ref 6.4–8.2)
PROTHROMBIN TIME: 15.6 SEC (ref 12.2–14.7)
SODIUM SERPL-SCNC: 138 MMOL/L (ref 135–145)
VALPROATE SERPL-MCNC: 15.4 UG/ML (ref 50–100)
VARIANT LYMPHS NFR BLD MANUAL: 5 %
WBC # BLD AUTO: 9.2 10^3/UL (ref 4.3–11)

## 2020-10-09 PROCEDURE — 85027 COMPLETE CBC AUTOMATED: CPT

## 2020-10-09 PROCEDURE — 85730 THROMBOPLASTIN TIME PARTIAL: CPT

## 2020-10-09 PROCEDURE — 36415 COLL VENOUS BLD VENIPUNCTURE: CPT

## 2020-10-09 PROCEDURE — 80053 COMPREHEN METABOLIC PANEL: CPT

## 2020-10-09 PROCEDURE — 83735 ASSAY OF MAGNESIUM: CPT

## 2020-10-09 PROCEDURE — 87040 BLOOD CULTURE FOR BACTERIA: CPT

## 2020-10-09 PROCEDURE — 85007 BL SMEAR W/DIFF WBC COUNT: CPT

## 2020-10-09 PROCEDURE — 80164 ASSAY DIPROPYLACETIC ACD TOT: CPT

## 2020-10-09 PROCEDURE — 83605 ASSAY OF LACTIC ACID: CPT

## 2020-10-09 PROCEDURE — 85610 PROTHROMBIN TIME: CPT

## 2020-10-09 PROCEDURE — 71045 X-RAY EXAM CHEST 1 VIEW: CPT

## 2020-10-09 PROCEDURE — 93041 RHYTHM ECG TRACING: CPT

## 2020-10-09 RX ADMIN — POLYETHYLENE GLYCOL (3350) SCH GM: 17 POWDER, FOR SOLUTION ORAL at 09:37

## 2020-10-09 RX ADMIN — RISPERIDONE SCH MG: 0.25 TABLET, FILM COATED ORAL at 09:20

## 2020-10-09 RX ADMIN — PAROXETINE HYDROCHLORIDE SCH MG: 20 TABLET, FILM COATED ORAL at 09:20

## 2020-10-09 RX ADMIN — SODIUM CHLORIDE SCH MLS/HR: 900 INJECTION INTRAVENOUS at 02:13

## 2020-10-09 RX ADMIN — ENOXAPARIN SODIUM SCH MG: 100 INJECTION SUBCUTANEOUS at 13:35

## 2020-10-09 RX ADMIN — Medication SCH MG: at 09:20

## 2020-10-09 RX ADMIN — CARBIDOPA AND LEVODOPA SCH EA: 25; 100 TABLET ORAL at 09:20

## 2020-10-09 RX ADMIN — DIVALPROEX SODIUM SCH MG: 125 CAPSULE ORAL at 09:20

## 2020-10-09 RX ADMIN — FAMOTIDINE SCH MG: 20 TABLET, FILM COATED ORAL at 09:20

## 2020-10-09 RX ADMIN — ASPIRIN 81 MG CHEWABLE TABLET SCH MG: 81 TABLET CHEWABLE at 09:20

## 2020-10-09 RX ADMIN — DIVALPROEX SODIUM SCH MG: 125 CAPSULE ORAL at 13:36

## 2020-10-09 RX ADMIN — SODIUM CHLORIDE SCH MLS/HR: 900 INJECTION INTRAVENOUS at 06:32

## 2020-10-09 RX ADMIN — SODIUM CHLORIDE SCH MLS/HR: 900 INJECTION INTRAVENOUS at 13:36

## 2020-10-09 RX ADMIN — CARBIDOPA AND LEVODOPA SCH EA: 25; 100 TABLET ORAL at 13:35

## 2020-10-09 NOTE — DISCHARGE SUMMARY
Diagnosis/Chief Complaint


Date of Admission


Oct 5, 2020 at 22:04


Date of Discharge





Admission Diagnosis


COVID19


Primary Care


Ric Putnam DO


Discharge Diagnosis





(1) Pneumonia due to COVID-19 virus


Status:  Acute


(2) Dehydration


Status:  Acute


(3) Essential (primary) hypertension


(4) Dementia


(5) Advanced dementia


(6) Dysphagia


(7) Poor prognosis





Discharge Summary


Discharge Physical Exam


Allergies:  


Coded Allergies:  


     No Known Drug Allergies (Unverified , 18)


Vitals & I&Os





Vital Signs








  Date Time  Temp Pulse Resp B/P (MAP) Pulse Ox O2 Delivery O2 Flow Rate FiO2


 


10/9/20 11:10 37.0 72 18 118/66 (83) 92 Room Air  








General Appearance:  Chronically ill


Respiratory:  Lungs Clear, No Accessory Muscle Use


Neurologic/Psychiatric:  Alert, Aphasia, Disoriented





Hospital Course


Labs (last 24 hrs)


Laboratory Tests


10/9/20 11:25: Lab Scanned Report Transfusion Reaction Form





Microbiology


10/5/20 Blood Culture - Preliminary, Resulted


          No growth


10/5/20 Urine Culture - Final, Complete


          NO GROWTH


Patient resulted labs reviewed.


Pending Labs


Laboratory Tests


10/9/20 11:25: Lab Scanned Report Transfusion Reaction Form





Imaging:  Reviewed Imaging Report





Discharge


Home Medications:





Active Scripts


Active


Reported


Zinc Sulfate 220 Mg Capsule 220 Mg PO DAILY


Miralax (Polyethylene Glycol 3350) 17 Gm Powd.pack 17 Gm PO DAILY


Acid Reducer (FAMOTIDINE) (Famotidine) 20 Mg Tablet 20 Mg PO DAILY


     COVID PROTOCOL


Dexamethasone 6 Mg Tablet 6 Mg PO DAILY


     COVID PROTOCOL


Aspirin 81 Mg Tab.chew 81 Mg PO DAILY


     GIVE 1 TABLET BY MOUTH ONE TIME A DAY FOR COVID FOR 14 DAYS


     LAST DOSE WILL BE ON 10/16/2020


Bisacodyl 10 Mg Supp.rect 10 Mg RC DAILY PRN


     USE IF NO BM IN 3 DAYS IF MOM NOT EFFECTIVE


Docusate Sodium 100 Mg Capsule 100 Mg PO DAILY


Depakote Sprinkle (Divalproex Sodium) 125 Mg Cap 125 Mg PO 1400


Divalproex Sodium 125 Mg Cap.sprink 250 Mg PO BID


     TAKES 2 (125MG) CAPS


Risperidone 0.5 Mg Tablet 0.5 Mg PO DAILY


Milk of Magnesia (Magnesium Hydroxide) 2,400 Mg/10 Ml Oral.susp 30 Ml PO DAILY 

PRN


Loperamide (Loperamide HCl) 2 Mg Tablet 2-4 Mg PO TID PRN MDD 6 TABS


     GIVE 2 TABS AS NEEDED AFTER LOOSE STOOL, THEN 1 TAB AFTER EACH LOOSE


     STOOL


Fleet Enema (Na Phos,M-B/Na Phos,Di-Ba) 133 Ml Enema 133 Ml RC DAILY PRN


     GIVE IF NO BM IN 5 DAYS


Tylenol (Acetaminophen) 325 Mg Tablet 650 Mg PO Q4H PRN


     TAKES 2 (325 MG) TABLETS


Sinemet  mg Tablet (Carbidopa/Levodopa) 1 Each Tablet 1 Tab PO TID


Paxil (Paroxetine HCl) 20 Mg Tablet 20 Mg PO DAILY


Entacapone 200 Mg Tablet 200 Mg PO BID


Diltiazem HCl 120 Mg Tablet 120 Mg PO DAILY


     HOLD FOR BLOOD PRESSURE LESS THAN 100/60 AND PULSE LESS THAN 60





Instructions to patient/family


Please see electronic discharge instructions given to patient.





Clinical Quality Measures


DVT/VTE Risk/Contraindication:


Risk Factor Score Per Nursin


RFS Level Per Nursing on Admit:  4+=Very High





Problem Qualifiers





(1) Dementia:  


Dementia type:  Parkinson's disease  Dementia behavioral disturbance:  without 

behavioral disturbance  Qualified Codes:  G20 - Parkinson's disease; F02.80 - 

Dementia in other diseases classified elsewhere without behavioral disturbance








DEON CALL MD               Oct 9, 2020 11:36

## 2020-10-09 NOTE — DIAGNOSTIC IMAGING REPORT
INDICATION: Pneumonia. 



COMPARISON: 10/05/2020.



EXAMINATION: Single view of the chest was obtained.



FINDINGS: Increasing infiltrate in the left hemithorax, new

infiltrates in the right hemithorax. There is no pneumothorax or

effusion. The heart size is prominent but stable. Osseous

structures are normal. 



IMPRESSION: Increasing infiltrates in the left hemithorax, new

infiltrates right hemithorax. 



Dictated by: 



  Dictated on workstation # [id number]

## 2020-10-09 NOTE — ED RESPIRATORY
General


Stated Complaint:  COVID +/PNEUMONIA


Source:  EMS, old records


Exam Limitations:  other (PT SEMI-OBTUNDED. KEEPS EYES CLOSED AND IS NOT TALKING

OR FOLLOWING COMMANDS)





History of Present Illness


Date Seen by Provider:  Oct 9, 2020


Time Seen by Provider:  19:00


Initial Comments


PT ARRIVES VIA EMS FROM [facility]


PT WITH KNOWN COVID-19 PNEUMONIA AND HAS BEEN HOSPITALIZED FROM 10/05 AND 

DISMISSED BACK TO NURSING HOME THIS AFTERNOON


PT WAS AFEBRILE AND HAD O2 SAT OF 92% ON ROOM AIR AT DISMISSAL, WITH CLEAR LUNGS

AND NON-LABORED RESPIRATIONS


NURSING HOME CALLED EMS, BECAUSE PT SPIKED A TEMP  JUST PRIOR TO ARRIVAL 

AND IMMEDIATELY CALLED EMS. GAVE TYLENOL JUST PRIOR TO ARRIVAL


O2 SAT WAS 90% ON ROOM AIR ON EMS ARRIVAL, UP TO 93% ON 3L/NC





PT HAS ADVANCED DEMENTIA AND IS ESSENTIALLY BED BOUND AND NON-VERBAL. 


PT IS DNR





PCP: DR. SHANE





Allergies and Home Medications


Allergies


Coded Allergies:  


     No Known Drug Allergies (Unverified , 2/9/18)





Home Medications


Acetaminophen 325 Mg Tablet, 650 MG PO Q4H PRN for PAIN-MILD, (Reported)


   TAKES 2 (325 MG) TABLETS 


Aspirin 81 Mg Tab.chew, 81 MG PO DAILY, (Reported)


   GIVE 1 TABLET BY MOUTH ONE TIME A DAY FOR COVID FOR 14 DAYS LAST DOSE WILL BE

ON 10/16/2020 


Bisacodyl 10 Mg Supp.rect, 10 MG RC DAILY PRN for CONSTIPATION-4TH LINE, 

(Reported)


   USE IF NO BM IN 3 DAYS IF MOM NOT EFFECTIVE 


Carbidopa/Levodopa 1 Each Tablet, 1 TAB PO TID, (Reported)


Cefdinir 300 Mg Capsule, 300 MG PO BID


   Prescribed by: DEON CALL on 10/9/20 1143


Dexamethasone 6 Mg Tablet, 6 MG PO DAILY, (Reported)


   COVID PROTOCOL 


Diltiazem HCl 120 Mg Tablet, 120 MG PO DAILY, (Reported)


   HOLD FOR BLOOD PRESSURE LESS THAN 100/60 AND PULSE LESS THAN 60 


Divalproex Sodium 125 Mg Cap.sprink, 250 MG PO BID, (Reported)


   TAKES 2 (125MG) CAPS 


Divalproex Sodium 125 Mg Cap, 125 MG PO 1400, (Reported)


Docusate Sodium 100 Mg Capsule, 100 MG PO DAILY, (Reported)


Entacapone 200 Mg Tablet, 200 MG PO BID, (Reported)


Famotidine 20 Mg Tablet, 20 MG PO DAILY, (Reported)


   COVID PROTOCOL 


Loperamide HCl 2 Mg Tablet, 2-4 MG PO TID PRN for DIARRHEA, (Reported)


   GIVE 2 TABS AS NEEDED AFTER LOOSE STOOL, THEN 1 TAB AFTER EACH LOOSE STOOL 


Magnesium Hydroxide 2,400 Mg/10 Ml Oral.susp, 30 ML PO DAILY PRN for 

CONSTIPATION-7TH LINE, (Reported)


Na Phos,M-B/Na Phos,Di-Ba 133 Ml Enema, 133 ML RC DAILY PRN for CONSTIPATION-8TH

LINE, (Reported)


   GIVE IF NO BM IN 5 DAYS 


Paroxetine HCl 20 Mg Tablet, 20 MG PO DAILY, (Reported)


Polyethylene Glycol 3350 17 Gm Powd.pack, 17 GM PO DAILY, (Reported)


Risperidone 0.5 Mg Tablet, 0.5 MG PO DAILY, (Reported)


Zinc Sulfate 220 Mg Capsule, 220 MG PO DAILY, (Reported)





Patient Home Medication List


Home Medication List Reviewed:  Yes





Review of Systems


Review of Systems


Constitutional:  see HPI, fever, other (PT UNABLE TO ANSWER QUESTIONS OR FOLLOW 

COMMANDS)


Respiratory:  see HPI; No short of breath





Past Medical-Social-Family Hx


Patient Social History


2nd Hand Smoke Exposure:  No


Recent Hopitalizations:  No (unknown)





Immunizations Up To Date


Tetanus Booster (TDap):  Unknown


Date of Pneumonia Vaccine:  Dec 1, 2019


Date of Influenza Vaccine:  Oct 1, 2019





Seasonal Allergies


Seasonal Allergies:  No





Past Medical History


Surgeries:  Yes (L THORACENTESIS 08/23/20;L HIP FX/REPLACEMENT 02/2018)


Joint Replacement, Orthopedic


Respiratory:  Yes (COVID-19 PNEUMONIA 10/2020;L PLEURAL EFF / THORACENTESIS 

08/23/20)


Pneumonia


Cardiac:  Yes


Hypertension


Neurological:  Yes (ADVANCED DEMENTIA, ESSENTIALLY BED BOUND AND NON-VERBAL)


Dementia, Parkinson's Disease


Genitourinary:  Yes (INCONTINENCE)


Gastrointestinal:  Yes


Chronic Constipation


Musculoskeletal:  Yes (L HIP FX/REPLACEMENT 02/2018; FALLS; ESS BEDBOUND)


Fractures


Psychosocial:  Yes (MANIPULATIVE BEHAVIORS, SEXUAL INAPROPRIATENESS)


Sleep Difficulties, Anxiety, Schizophrenia, Depression


Blood Disorders:  No


Adverse Reaction/Blood Tranf:  No





Family Medical History


No Pertinent Family Hx





Physical Exam





Vital Signs - First Documented








 10/9/20 10/9/20





 19:03 19:15


 


Temp 38.2 


 


Pulse 90 


 


Resp 18 


 


B/P (MAP) 135/83 (100) 


 


Pulse Ox 96 


 


O2 Delivery Nasal Cannula 


 


O2 Flow Rate  2.00





Capillary Refill :


Height: 5'8.00"


Weight: 183lbs. 3.0oz. 83.038313ha; 21.64 BMI


Method:Estimated


General Appearance:  WD/WN, no apparent distress, other (RESPIRATIONS EVEN AND 

UNLABORED, SITTING UP WITH EYES CLOSED, MOANING AT TIMES. DOES NOT TALK OR 

FOLLOW COMMANDS. BILATERAL HEEL PROTECTORS)


Neck:  normal inspection


Respiratory:  normal breath sounds, no respiratory distress, no accessory muscle

use


Cardiovascular:  regular rate, rhythm, no murmur


Gastrointestinal:  soft


Extremities:  no pedal edema


Neurologic/Psychiatric:  other (NOT TALKING OR FOLLOWING COMMANDS, KEEPS EYES CL

OSED. )


Skin:  normal color, warm/dry





Focused Exam


Lactate Level


10/9/20 19:14: Lactic Acid Level 0.85





Lactic Acid Level





Laboratory Tests








Test


 10/9/20


19:14


 


Lactic Acid Level


 0.85 MMOL/L


(0.50-2.00)











Progress/Results/Core Measures


Suspected Sepsis


SIRS


Temperature: 


Pulse:  


Respiratory Rate: 


 


Laboratory Tests


10/9/20 19:14: White Blood Count 9.2


Blood Pressure  / 


Mean: 


 


10/9/20 19:14: Lactic Acid Level 0.85


Laboratory Tests


10/9/20 19:14: 


Creatinine 0.56L, INR Comment 1.2, Platelet Count 348, Total Bilirubin 0.6








Results/Orders


Lab Results





Laboratory Tests








Test


 10/9/20


19:14 Range/Units


 


 


White Blood Count


 9.2 


 4.3-11.0


10^3/uL


 


Red Blood Count


 3.90 L


 4.30-5.52


10^6/uL


 


Hemoglobin 10.5 L 13.3-17.7  g/dL


 


Hematocrit 33 L 40-54  %


 


Mean Corpuscular Volume 83  80-99  fL


 


Mean Corpuscular Hemoglobin 27  25-34  pg


 


Mean Corpuscular Hemoglobin


Concent 32 


 32-36  g/dL





 


Red Cell Distribution Width 14.7 H 10.0-14.5  %


 


Platelet Count


 348 


 130-400


10^3/uL


 


Mean Platelet Volume 10.7  9.0-12.2  fL


 


Immature Granulocyte % (Auto) 1   %


 


Neutrophils (%) (Auto) 88 H 42-75  %


 


Lymphocytes (%) (Auto) 6 L 12-44  %


 


Monocytes (%) (Auto) 5  0-12  %


 


Eosinophils (%) (Auto) 0  0-10  %


 


Basophils (%) (Auto) 0  0-10  %


 


Neutrophils # (Auto)


 8.1 H


 1.8-7.8


10^3/uL


 


Lymphocytes # (Auto)


 0.5 L


 1.0-4.0


10^3/uL


 


Monocytes # (Auto)


 0.5 


 0.0-1.0


10^3/uL


 


Eosinophils # (Auto)


 0.0 


 0.0-0.3


10^3/uL


 


Basophils # (Auto)


 0.0 


 0.0-0.1


10^3/uL


 


Immature Granulocyte # (Auto)


 0.1 


 0.0-0.1


10^3/uL


 


Neutrophils % (Manual) 90   %


 


Lymphocytes % (Manual) 5   %


 


Monocytes % (Manual) 5   %


 


Eosinophils % (Manual) 0   %


 


Basophils % (Manual) 0   %


 


Band Neutrophils 0   %


 


Anisocytosis SLIGHT   


 


Prothrombin Time 15.6 H 12.2-14.7  SEC


 


INR Comment 1.2  0.8-1.4  


 


Activated Partial


Thromboplast Time 40 H


 24-35  SEC





 


Sodium Level 138  135-145  MMOL/L


 


Potassium Level 3.5 L 3.6-5.0  MMOL/L


 


Chloride Level 104    MMOL/L


 


Carbon Dioxide Level 21  21-32  MMOL/L


 


Anion Gap 13  5-14  MMOL/L


 


Blood Urea Nitrogen 17  7-18  MG/DL


 


Creatinine


 0.56 L


 0.60-1.30


MG/DL


 


Estimat Glomerular Filtration


Rate > 60 


  





 


BUN/Creatinine Ratio 30   


 


Glucose Level 109 H   MG/DL


 


Lactic Acid Level


 0.85 


 0.50-2.00


MMOL/L


 


Calcium Level 8.2 L 8.5-10.1  MG/DL


 


Corrected Calcium 9.2  8.5-10.1  MG/DL


 


Magnesium Level 1.8  1.6-2.4  MG/DL


 


Total Bilirubin 0.6  0.1-1.0  MG/DL


 


Aspartate Amino Transf


(AST/SGOT) 34 


 5-34  U/L





 


Alanine Aminotransferase


(ALT/SGPT) 20 


 0-55  U/L





 


Alkaline Phosphatase 122    U/L


 


Total Protein 7.2  6.4-8.2  GM/DL


 


Albumin 2.8 L 3.2-4.5  GM/DL


 


Valproic Acid (Depakene) Level


 15.4 L


 50.0-100.0


UG/ML








My Orders





Orders - CHINA GUPTA DO


Ns Iv 1000 Ml (Sodium Chloride 0.9%) (10/9/20 19:05)


Ibuprofen Suspension (Motrin Suspension) (10/9/20 19:06)


Ed Iv/Invasive Line Start (10/9/20 19:12)


O2 (10/9/20 19:12)


Monitor-Rhythm Ecg Trace Only (10/9/20 19:12)


Chest 1 View, Ap/Pa Only (10/9/20 19:12)


Cbc With Automated Diff (10/9/20 19:12)


Comprehensive Metabolic Panel (10/9/20 19:12)


Lactic Acid Analyzer (10/9/20 19:12)


Magnesium (10/9/20 19:12)


Protime With Inr (10/9/20 19:12)


Partial Thromboplastin Time (10/9/20 19:12)


Blood Culture (10/9/20 19:12)


Ed Iv/Invasive Line Start (10/9/20 19:12)


Ns Iv 1000 Ml (Sodium Chloride 0.9%) (10/9/20 19:12)


Ibuprofen Suspension (Motrin Suspension) (10/9/20 19:15)


Cefepime Injection (Maxipime Injection) (10/9/20 19:30)


Valproic Acid (10/9/20 19:21)


Manual Differential (10/9/20 19:14)





Medications Given in ED





Current Medications








 Medications  Dose


 Ordered  Sig/Carlos


 Route  Start Time


 Stop Time Status Last Admin


Dose Admin


 


 Cefepime HCl 2000


 mg/Sterile Water  20 ml @ 


 240 mls/hr  ONCE  ONCE


 IV  10/9/20 19:30


 10/9/20 19:34 DC 10/9/20 19:35


240 MLS/HR


 


 Ibuprofen  800 mg  ONCE  ONCE


 PO  10/9/20 19:15


 10/9/20 19:16 DC 10/9/20 19:34


800 MG








Vital Signs/I&O











 10/9/20 10/9/20 10/9/20





 19:03 19:15 22:55


 


Temp 38.2  


 


Pulse 90  72


 


Resp 18  18


 


B/P (MAP) 135/83 (100)  117/77


 


Pulse Ox 96 96 95


 


O2 Delivery Nasal Cannula Nasal Cannula Room Air


 


O2 Flow Rate  2.00 














 10/10/20





 00:00


 


Intake Total 1000 ml


 


Balance 1000 ml





Capillary Refill :


Progress Note :  


Progress Note


PLACED IN ISOLATION ROOM, PPE WORN AT ALL TIMES





O2 SATS 98% ON 3L/NC





2100--NOW ON ROOM AIR--O2 SATS REMAINED A CONSTANT 93% FOR REMAINDER OF ER STAY


NO DYSPNEA


NO COUGH NOTED AT ANY TIME


OTHER VITALS STABLE


NO DETERIORATION IN PT'S CONDITION DURING ER STAY





Diagnostic Imaging





Comments


CXR--PER RADIOLOGIST REPORT 





FINDINGS: Increasing infiltrate in the left hemithorax, new


infiltrates in the right hemithorax. There is no pneumothorax or


effusion. The heart size is prominent but stable. Osseous


structures are normal. 





IMPRESSION: Increasing infiltrates in the left hemithorax, new


infiltrates right hemithorax.


   Reviewed:  Reviewed by Me





Departure


Communication (Admissions)


2100--SPOKE WITH DR. CALL, WILL GO TRIAL OF TAKING PT OFF O2 AND WILL CALL HER 

BACK


2205--SPOKE WITH DR. CALL, PT HAS MAINTAINED O2 SAT 93% ON ROOM AIR FOR OVER AN 

HOUR,SO WILL SEND BACK TO NURSING HOME AND CONTINUE ALL PREVIOUS ORDERS





Impression





   Primary Impression:  


   Pneumonia due to COVID-19 virus


   Additional Impression:  


   Advanced dementia


Disposition:  03 XFER SNF


Condition:  Stable





Departure-Patient Inst.


Referrals:  


CORNELIUS SHANE DO (PCP/Family)


Primary Care Physician


Patient Instructions:  Coronavirus Disease 2019 (COVID-19) (DC)





Add. Discharge Instructions:  


CONTINUE ALL PREVIOUS ORDERS





FURTHER ORDERS BY CHINA HERRERA DO                  Oct 9, 2020 19:26

## 2020-10-09 NOTE — NUR
Late entry:

Pt arrives by EMS with c/o fever today; pt was discharged from this hospital 
today after a wk's admission for Covid pneumonia. Staff at his NH state he had 
fever of 104; pt received 1G tylenol rectally just PTA. Pt was not more sob 
than lencho.

Pt has baseline mentation of alert to voice. 

Pt is febrile; Dr. Mendoza to room for eval.

## 2020-10-09 NOTE — NUR
Attempted to call report to [facility] x 2; "they can't find the nurse" 
but they will call when available. EMS here to transport patient back.

## 2020-10-09 NOTE — DISCHARGE INST-SIMPLE/STANDARD
Discharge Inst-Standard


Patient Instructions/Follow Up


Plan of Care/Instructions/FU:  


Please continue to take your medications as written. Please follow up with


your primary care doctor to follow up this hospital stay.


Activity as Tolerated:  Yes


Discharge Diet:  Soft Diet


Return to The Hospital For:  


Chest pain, fever, confusion, shortness of breath, if you feel you are


getting worse.











DEON CALL MD               Oct 9, 2020 11:42

## 2020-10-09 NOTE — NUR
CM/SS finalized plan. 



Plan: Patient is discharging back to [facility] today 10/9.  
time is set for 3:30 p.m. 



CM/SS faxed discharge to facility. 



CM/SS contacted [facility] to inform him that patient is able to discharge today with 
confirmed  time. No further questions. 



No further needs at this time.

## 2020-10-12 ENCOUNTER — HOSPITAL ENCOUNTER (INPATIENT)
Dept: HOSPITAL 75 - ER | Age: 60
LOS: 4 days | Discharge: NURSING, REHAB OR LONG-TERM CARE | DRG: 177 | End: 2020-10-16
Attending: INTERNAL MEDICINE | Admitting: INTERNAL MEDICINE
Payer: MEDICAID

## 2020-10-12 VITALS — WEIGHT: 152.56 LBS | BODY MASS INDEX: 20.66 KG/M2 | HEIGHT: 71.97 IN

## 2020-10-12 VITALS — SYSTOLIC BLOOD PRESSURE: 111 MMHG | DIASTOLIC BLOOD PRESSURE: 62 MMHG

## 2020-10-12 VITALS — SYSTOLIC BLOOD PRESSURE: 136 MMHG | DIASTOLIC BLOOD PRESSURE: 78 MMHG

## 2020-10-12 VITALS — DIASTOLIC BLOOD PRESSURE: 62 MMHG | SYSTOLIC BLOOD PRESSURE: 111 MMHG

## 2020-10-12 DIAGNOSIS — J96.00: ICD-10-CM

## 2020-10-12 DIAGNOSIS — R13.10: ICD-10-CM

## 2020-10-12 DIAGNOSIS — I10: ICD-10-CM

## 2020-10-12 DIAGNOSIS — F02.80: ICD-10-CM

## 2020-10-12 DIAGNOSIS — F20.9: ICD-10-CM

## 2020-10-12 DIAGNOSIS — U07.1: Primary | ICD-10-CM

## 2020-10-12 DIAGNOSIS — G20: ICD-10-CM

## 2020-10-12 DIAGNOSIS — J12.89: ICD-10-CM

## 2020-10-12 LAB
BASOPHILS # BLD AUTO: 0 10^3/UL (ref 0–0.1)
BASOPHILS # BLD AUTO: 0 10^3/UL (ref 0–0.1)
BASOPHILS NFR BLD AUTO: 0 % (ref 0–10)
BASOPHILS NFR BLD AUTO: 0 % (ref 0–10)
BUN/CREAT SERPL: 42
CALCIUM SERPL-MCNC: 8.3 MG/DL (ref 8.5–10.1)
CHLORIDE SERPL-SCNC: 105 MMOL/L (ref 98–107)
CO2 SERPL-SCNC: 24 MMOL/L (ref 21–32)
CREAT SERPL-MCNC: 0.67 MG/DL (ref 0.6–1.3)
EOSINOPHIL # BLD AUTO: 0 10^3/UL (ref 0–0.3)
EOSINOPHIL # BLD AUTO: 0 10^3/UL (ref 0–0.3)
EOSINOPHIL NFR BLD AUTO: 0 % (ref 0–10)
EOSINOPHIL NFR BLD AUTO: 0 % (ref 0–10)
GFR SERPLBLD BASED ON 1.73 SQ M-ARVRAT: > 60 ML/MIN
GLUCOSE SERPL-MCNC: 98 MG/DL (ref 70–105)
HCT VFR BLD CALC: 32 % (ref 40–54)
HCT VFR BLD CALC: 34 % (ref 40–54)
HGB BLD-MCNC: 10.4 G/DL (ref 13.3–17.7)
HGB BLD-MCNC: 10.9 G/DL (ref 13.3–17.7)
LYMPHOCYTES # BLD AUTO: 0.8 10^3/UL (ref 1–4)
LYMPHOCYTES # BLD AUTO: 0.9 10^3/UL (ref 1–4)
LYMPHOCYTES NFR BLD AUTO: 11 % (ref 12–44)
LYMPHOCYTES NFR BLD AUTO: 7 % (ref 12–44)
MANUAL DIFFERENTIAL PERFORMED BLD QL: NO
MANUAL DIFFERENTIAL PERFORMED BLD QL: NO
MCH RBC QN AUTO: 27 PG (ref 25–34)
MCH RBC QN AUTO: 27 PG (ref 25–34)
MCHC RBC AUTO-ENTMCNC: 32 G/DL (ref 32–36)
MCHC RBC AUTO-ENTMCNC: 33 G/DL (ref 32–36)
MCV RBC AUTO: 83 FL (ref 80–99)
MCV RBC AUTO: 83 FL (ref 80–99)
MONOCYTES # BLD AUTO: 0.8 10^3/UL (ref 0–1)
MONOCYTES # BLD AUTO: 0.8 10^3/UL (ref 0–1)
MONOCYTES NFR BLD AUTO: 10 % (ref 0–12)
MONOCYTES NFR BLD AUTO: 7 % (ref 0–12)
NEUTROPHILS # BLD AUTO: 6.4 10^3/UL (ref 1.8–7.8)
NEUTROPHILS # BLD AUTO: 9.1 10^3/UL (ref 1.8–7.8)
NEUTROPHILS NFR BLD AUTO: 79 % (ref 42–75)
NEUTROPHILS NFR BLD AUTO: 85 % (ref 42–75)
PLATELET # BLD: 446 10^3/UL (ref 130–400)
PLATELET # BLD: 500 10^3/UL (ref 130–400)
PMV BLD AUTO: 10.2 FL (ref 9–12.2)
PMV BLD AUTO: 10.4 FL (ref 9–12.2)
POTASSIUM SERPL-SCNC: 4.5 MMOL/L (ref 3.6–5)
SODIUM SERPL-SCNC: 139 MMOL/L (ref 135–145)
WBC # BLD AUTO: 10.8 10^3/UL (ref 4.3–11)
WBC # BLD AUTO: 8.1 10^3/UL (ref 4.3–11)

## 2020-10-12 PROCEDURE — 80048 BASIC METABOLIC PNL TOTAL CA: CPT

## 2020-10-12 PROCEDURE — 80053 COMPREHEN METABOLIC PANEL: CPT

## 2020-10-12 PROCEDURE — 84145 PROCALCITONIN (PCT): CPT

## 2020-10-12 PROCEDURE — 83605 ASSAY OF LACTIC ACID: CPT

## 2020-10-12 PROCEDURE — 85025 COMPLETE CBC W/AUTO DIFF WBC: CPT

## 2020-10-12 PROCEDURE — 94761 N-INVAS EAR/PLS OXIMETRY MLT: CPT

## 2020-10-12 PROCEDURE — 90686 IIV4 VACC NO PRSV 0.5 ML IM: CPT

## 2020-10-12 PROCEDURE — 71045 X-RAY EXAM CHEST 1 VIEW: CPT

## 2020-10-12 PROCEDURE — 36415 COLL VENOUS BLD VENIPUNCTURE: CPT

## 2020-10-12 RX ADMIN — SODIUM CHLORIDE, SODIUM LACTATE, POTASSIUM CHLORIDE, AND CALCIUM CHLORIDE SCH MLS/HR: 600; 310; 30; 20 INJECTION, SOLUTION INTRAVENOUS at 17:46

## 2020-10-12 NOTE — DIAGNOSTIC IMAGING REPORT
Indication: Fever and cough.



Time of exam: 11:47 AM



Correlation is made with prior radiograph from 10/09/2020.



Heart size is stable. Patient has developed some infiltrate in

the right upper lobe consistent with pneumonia. Left-sided

infiltrate is stable. There is no effusion or pneumothorax.



Impression: Worsening right-sided pulmonary infiltrate and stable

left pulmonary infiltrate when compared to exam from 3 days

earlier.



Dictated by: 



  Dictated on workstation # [id number]

## 2020-10-12 NOTE — NUR
Bob Jose admitted to room 423-1, with an admitting diagnosis of covid positive and 
hypoxia, on 10/12/20 from AM via , accompanied by BOB PEREZ introduced to surroundings, 
call light, bed controls, phone, TV, temperature control, lights, meal times, smoking 
policy, visitor policy, side rail policy, bathrooms and showers.  Patient Rights given to 
patient in the handbook.

## 2020-10-12 NOTE — ED COUGH/URI
General


Chief Complaint:  Respiratory Problems


Stated Complaint:  COVID+


Source:  nursing home records


Exam Limitations:  no limitations





History of Present Illness


Date Seen by Provider:  Oct 12, 2020


Time Seen by Provider:  11:39


Initial Comments


To ER by [facility] from [facility] with reports

that his brother wanted to see if we could do anything more for him. He has a 

known left pleural effusion, he is known Covid-positive, finished an antibiotic 

today for associated pneumonia. He is oxygen dependent at 2 L. He has 

schizophrenia and advanced dementia


Timing/Duration:  just prior to arrival


Severity/Quality:  dry cough


Prior Episodes/Possible Cause:  no prior episodes


Associated Symptoms:  cough, shortness of breath





Allergies and Home Medications


Allergies


Coded Allergies:  


     No Known Drug Allergies (Unverified , 2/9/18)





Home Medications


Acetaminophen 325 Mg Tablet, 650 MG PO Q4H PRN for PAIN-MILD, (Reported)


   TAKES 2 (325 MG) TABLETS 


Aspirin 81 Mg Tab.chew, 81 MG PO DAILY, (Reported)


   GIVE 1 TABLET BY MOUTH ONE TIME A DAY FOR COVID FOR 14 DAYS LAST DOSE WILL BE

ON 10/16/2020 


Bisacodyl 10 Mg Supp.rect, 10 MG RC DAILY PRN for CONSTIPATION-4TH LINE, 

(Reported)


   USE IF NO BM IN 3 DAYS IF MOM NOT EFFECTIVE 


Carbidopa/Levodopa 1 Each Tablet, 1 TAB PO TID, (Reported)


Cefdinir 300 Mg Capsule, 300 MG PO BID


   Prescribed by: DEON CALL on 10/9/20 1143


Dexamethasone 6 Mg Tablet, 6 MG PO DAILY, (Reported)


   COVID PROTOCOL 


Diltiazem HCl 120 Mg Tablet, 120 MG PO DAILY, (Reported)


   HOLD FOR BLOOD PRESSURE LESS THAN 100/60 AND PULSE LESS THAN 60 


Divalproex Sodium 125 Mg Cap.sprink, 250 MG PO BID, (Reported)


   TAKES 2 (125MG) CAPS 


Divalproex Sodium 125 Mg Cap, 125 MG PO 1400, (Reported)


Docusate Sodium 100 Mg Capsule, 100 MG PO DAILY, (Reported)


Entacapone 200 Mg Tablet, 200 MG PO BID, (Reported)


Famotidine 20 Mg Tablet, 20 MG PO DAILY, (Reported)


   COVID PROTOCOL 


Loperamide HCl 2 Mg Tablet, 2-4 MG PO TID PRN for DIARRHEA, (Reported)


   GIVE 2 TABS AS NEEDED AFTER LOOSE STOOL, THEN 1 TAB AFTER EACH LOOSE STOOL 


Magnesium Hydroxide 2,400 Mg/10 Ml Oral.susp, 30 ML PO DAILY PRN for 

CONSTIPATION-7TH LINE, (Reported)


Na Phos,M-B/Na Phos,Di-Ba 133 Ml Enema, 133 ML RC DAILY PRN for CONSTIPATION-8TH

LINE, (Reported)


   GIVE IF NO BM IN 5 DAYS 


Paroxetine HCl 20 Mg Tablet, 20 MG PO DAILY, (Reported)


Polyethylene Glycol 3350 17 Gm Powd.pack, 17 GM PO DAILY, (Reported)


Risperidone 0.5 Mg Tablet, 0.5 MG PO DAILY, (Reported)


Zinc Sulfate 220 Mg Capsule, 220 MG PO DAILY, (Reported)





Patient Home Medication List


Home Medication List Reviewed:  Yes





Review of Systems


Review of Systems


Constitutional:  see HPI, other (unable to obtain due to dementia)





Past Medical-Social-Family Hx


Patient Social History


2nd Hand Smoke Exposure:  No


Recent Hopitalizations:  No (unknown)





Immunizations Up To Date


Tetanus Booster (TDap):  Unknown


Date of Pneumonia Vaccine:  Dec 1, 2019


Date of Influenza Vaccine:  Oct 1, 2019





Seasonal Allergies


Seasonal Allergies:  No





Past Medical History


Surgeries:  Yes (L THORACENTESIS 08/23/20;L HIP FX/REPLACEMENT 02/2018)


Joint Replacement, Orthopedic


Respiratory:  Yes (COVID-19 PNEUMONIA 10/2020;L PLEURAL EFF / THORACENTESIS 

08/23/20)


Pneumonia


Cardiac:  Yes


Hypertension


Neurological:  Yes (ADVANCED DEMENTIA, ESSENTIALLY BED BOUND AND NON-VERBAL)


Dementia, Parkinson's Disease


Genitourinary:  Yes (INCONTINENCE)


Gastrointestinal:  Yes


Chronic Constipation


Musculoskeletal:  Yes (L HIP FX/REPLACEMENT 02/2018; FALLS; ESS BEDBOUND)


Fractures


Psychosocial:  Yes (MANIPULATIVE BEHAVIORS, SEXUAL INAPROPRIATENESS)


Sleep Difficulties, Anxiety, Schizophrenia, Depression


Blood Disorders:  No


Adverse Reaction/Blood Tranf:  No





Family Medical History


No Pertinent Family Hx





Physical Exam





Vital Signs - First Documented








 10/12/20





 11:20


 


Temp 36.9


 


Pulse 90


 


Resp 22


 


B/P (MAP) 129/80 (96)


 


Pulse Ox 92


 


O2 Flow Rate 4.00





Capillary Refill :


Height: 5'8.00"


Weight: 183lbs. 3.0oz. 83.481037af; 21.00 BMI


Method:Estimated


General Appearance:  cachetic (chronically ill, nonverbal, 92% on 4 L), thin


Eyes:  Bilateral Eye Normal Inspection, Bilateral Eye PERRL


Neck:  non-tender, full range of motion


Respiratory:  no respiratory distress, no accessory muscle use, decreased breath

sounds


Gastrointestinal:  normal bowel sounds, soft


Neurologic/Psychiatric:  alert


Skin:  normal color, warm/dry





Focused Exam


Lactate Level


10/12/20 11:36: Lactic Acid Level 0.86





Lactic Acid Level





Laboratory Tests








Test


 10/12/20


11:36


 


Lactic Acid Level


 0.86 MMOL/L


(0.50-2.00)











Progress/Results/Core Measures


Suspected Sepsis


SIRS


Temperature: 


Pulse:  


Respiratory Rate: 


 


Laboratory Tests


10/12/20 11:36: White Blood Count 8.1


Blood Pressure  / 


Mean: 


 





10/12/20 11:36: Lactic Acid Level 0.86


Laboratory Tests


10/12/20 11:36: 


Creatinine 0.67, Platelet Count 500H








Results/Orders


Lab Results





Laboratory Tests








Test


 10/12/20


11:36 Range/Units


 


 


White Blood Count


 8.1 


 4.3-11.0


10^3/uL


 


Red Blood Count


 4.02 L


 4.30-5.52


10^6/uL


 


Hemoglobin 10.9 L 13.3-17.7  g/dL


 


Hematocrit 34 L 40-54  %


 


Mean Corpuscular Volume 83  80-99  fL


 


Mean Corpuscular Hemoglobin 27  25-34  pg


 


Mean Corpuscular Hemoglobin


Concent 33 


 32-36  g/dL





 


Red Cell Distribution Width 15.0 H 10.0-14.5  %


 


Platelet Count


 500 H


 130-400


10^3/uL


 


Mean Platelet Volume 10.4  9.0-12.2  fL


 


Immature Granulocyte % (Auto) 1   %


 


Neutrophils (%) (Auto) 79 H 42-75  %


 


Lymphocytes (%) (Auto) 11 L 12-44  %


 


Monocytes (%) (Auto) 10  0-12  %


 


Eosinophils (%) (Auto) 0  0-10  %


 


Basophils (%) (Auto) 0  0-10  %


 


Neutrophils # (Auto)


 6.4 


 1.8-7.8


10^3/uL


 


Lymphocytes # (Auto)


 0.9 L


 1.0-4.0


10^3/uL


 


Monocytes # (Auto)


 0.8 


 0.0-1.0


10^3/uL


 


Eosinophils # (Auto)


 0.0 


 0.0-0.3


10^3/uL


 


Basophils # (Auto)


 0.0 


 0.0-0.1


10^3/uL


 


Immature Granulocyte # (Auto)


 0.1 


 0.0-0.1


10^3/uL


 


Sodium Level 139  135-145  MMOL/L


 


Potassium Level 4.5  3.6-5.0  MMOL/L


 


Chloride Level 105    MMOL/L


 


Carbon Dioxide Level 24  21-32  MMOL/L


 


Anion Gap 10  5-14  MMOL/L


 


Blood Urea Nitrogen 28 H 7-18  MG/DL


 


Creatinine


 0.67 


 0.60-1.30


MG/DL


 


Estimat Glomerular Filtration


Rate > 60 


  





 


BUN/Creatinine Ratio 42   


 


Glucose Level 98    MG/DL


 


Lactic Acid Level


 0.86 


 0.50-2.00


MMOL/L


 


Calcium Level 8.3 L 8.5-10.1  MG/DL


 


Procalcitonin 0.05  <0.10  NG/ML








My Orders





Orders - CRYSTAL BEAUCHAMP


Cbc With Automated Diff (10/12/20 11:37)


Basic Metabolic Panel (10/12/20 11:37)


Lactic Acid Analyzer (10/12/20 11:37)


Chest 1 View, Ap/Pa Only (10/12/20 11:37)


Ed Iv/Invasive Line Start (10/12/20 11:37)


Procalcitonin (Pct) (10/12/20 11:44)


Ns Iv 1000 Ml (Sodium Chloride 0.9%) (10/12/20 12:00)





Vital Signs/I&O











 10/12/20





 11:20


 


Temp 36.9


 


Pulse 90


 


Resp 22


 


B/P (MAP) 129/80 (96)


 


Pulse Ox 92


 


O2 Flow Rate 4.00





Capillary Refill :





Departure


Communication (Admissions)


Time/Spoke to Admitting Phy:  13:36


Spoke with Dr. Gautam, will admit


1335-I discussed with the patient's brother JUAN who is very pleasant and 

reasonable, would like to proceed with room does severe but understands the poor

prognosis and if Bob fails to improve in the next few days he would  consider

comfort measures.





Impression





   Primary Impression:  


   COVID-19


   Additional Impressions:  


   Advanced dementia


   Poor prognosis


Disposition:  09 ADMITTED AS INPATIENT


Condition:  Unchanged





Admissions


Decision to Admit Reason:  Admit from ER (General)


Decision to Admit/Date:  Oct 12, 2020


Time/Decision to Admit Time:  13:36





Departure-Patient Inst.


Referrals:  


CORNELIUS SHANE DO (PCP/Family)


Primary Care Physician











CRYSTAL BEAUCHAMP             Oct 12, 2020 11:41

## 2020-10-13 VITALS — SYSTOLIC BLOOD PRESSURE: 137 MMHG | DIASTOLIC BLOOD PRESSURE: 82 MMHG

## 2020-10-13 VITALS — SYSTOLIC BLOOD PRESSURE: 155 MMHG | DIASTOLIC BLOOD PRESSURE: 84 MMHG

## 2020-10-13 VITALS — DIASTOLIC BLOOD PRESSURE: 80 MMHG | SYSTOLIC BLOOD PRESSURE: 129 MMHG

## 2020-10-13 VITALS — SYSTOLIC BLOOD PRESSURE: 134 MMHG | DIASTOLIC BLOOD PRESSURE: 78 MMHG

## 2020-10-13 VITALS — DIASTOLIC BLOOD PRESSURE: 74 MMHG | SYSTOLIC BLOOD PRESSURE: 128 MMHG

## 2020-10-13 VITALS — SYSTOLIC BLOOD PRESSURE: 118 MMHG | DIASTOLIC BLOOD PRESSURE: 74 MMHG

## 2020-10-13 VITALS — DIASTOLIC BLOOD PRESSURE: 79 MMHG | SYSTOLIC BLOOD PRESSURE: 150 MMHG

## 2020-10-13 LAB
ALBUMIN SERPL-MCNC: 2.8 GM/DL (ref 3.2–4.5)
ALP SERPL-CCNC: 100 U/L (ref 40–136)
ALT SERPL-CCNC: 26 U/L (ref 0–55)
BILIRUB SERPL-MCNC: 0.5 MG/DL (ref 0.1–1)
BUN/CREAT SERPL: 45
CALCIUM SERPL-MCNC: 8.3 MG/DL (ref 8.5–10.1)
CHLORIDE SERPL-SCNC: 105 MMOL/L (ref 98–107)
CO2 SERPL-SCNC: 22 MMOL/L (ref 21–32)
CREAT SERPL-MCNC: 0.53 MG/DL (ref 0.6–1.3)
GFR SERPLBLD BASED ON 1.73 SQ M-ARVRAT: > 60 ML/MIN
GLUCOSE SERPL-MCNC: 93 MG/DL (ref 70–105)
POTASSIUM SERPL-SCNC: 4 MMOL/L (ref 3.6–5)
PROT SERPL-MCNC: 6.8 GM/DL (ref 6.4–8.2)
SODIUM SERPL-SCNC: 137 MMOL/L (ref 135–145)

## 2020-10-13 PROCEDURE — XW13325 TRANSFUSION OF CONVALESCENT PLASMA (NONAUTOLOGOUS) INTO PERIPHERAL VEIN, PERCUTANEOUS APPROACH, NEW TECHNOLOGY GROUP 5: ICD-10-PCS | Performed by: INTERNAL MEDICINE

## 2020-10-13 RX ADMIN — SODIUM CHLORIDE, SODIUM LACTATE, POTASSIUM CHLORIDE, AND CALCIUM CHLORIDE SCH MLS/HR: 600; 310; 30; 20 INJECTION, SOLUTION INTRAVENOUS at 01:29

## 2020-10-13 RX ADMIN — ENOXAPARIN SODIUM SCH MG: 100 INJECTION SUBCUTANEOUS at 10:46

## 2020-10-13 RX ADMIN — SODIUM CHLORIDE SCH MLS/HR: 900 INJECTION, SOLUTION INTRAVENOUS at 14:49

## 2020-10-13 RX ADMIN — SODIUM CHLORIDE, SODIUM LACTATE, POTASSIUM CHLORIDE, AND CALCIUM CHLORIDE SCH MLS/HR: 600; 310; 30; 20 INJECTION, SOLUTION INTRAVENOUS at 09:13

## 2020-10-13 RX ADMIN — SODIUM CHLORIDE, SODIUM LACTATE, POTASSIUM CHLORIDE, AND CALCIUM CHLORIDE SCH MLS/HR: 600; 310; 30; 20 INJECTION, SOLUTION INTRAVENOUS at 17:16

## 2020-10-13 NOTE — NUR
RD ASSESSMENT 



PMHx: Parkinson's disease; dementia; HTN; chronic constipation



PT INTERACTION: Note pt is COVID-19 positive and has hx of dementia, per chart review. Note 
all diet information gathered for nutrition assessment is per chart review. Note PO intake 
of 0% x4meal. Note last BM was 10/12, and pt not currently on bowel regimen. Not recent 20# 
wt loss x2mon. This is significant wt loss at 12% x2mon. 



Given PO intake and wt hx, pt meets criteria for malnutrition, per ASPEN guidelines. 



ABNORMAL NUTRITION-RELATED LAB VALUES

LOW: cr 0.53; Ca 8.3; alb 2.8

HIGH: BUN 24



Est. kcal needs: 2075 kcal | 30 kcal/kg 

Est. Pro needs:  83 g Pro | 1.2 g Pro/kg 



PES STATEMENT: Inadequate oral intake (NI-2.1) related to loss of appetite as evidenced by 
chart review | PO intake 0% x4meal



INTERVENTION:  

Continue with current diet order of Regular diet. 

Add Ensure Enlive (vary) to meals TID, for increased kcal intake. Provides 350 kcal and 13 g 
Pro per serving. 

Will continue to follow and reassess as pt needs, intake, and status change. 





KARL Tate, MS RD LD

## 2020-10-13 NOTE — SPEECH THERAPY PROGRESS NOTE
Therapy Progress Note


ST received orders for a speech evaluation. The patient is known to me while 

working PRN at [employer] in the past. The patient has dementia and has been nonverbal

for as long as I have known him. ST is discharging the orders for speech 

evaluation due to patient's current status is at baseline and is not appropriate

to complete.











LENORE PERSAUD            Oct 13, 2020 09:55

## 2020-10-13 NOTE — HISTORY & PHYSICAL-HOSPITALIST
History of Present Illness


HPI/Chief Complaint


Bob Jose is a 59 year old male with advanced Parkinson's dementia who 

presented with worsening hypoxia due to COVID-19. He was recently admitted with 

COVID and discharged. He was not requiring oxygen at that time and did not 

receive Remdesivir, but did receive steroids and convalescent plasma. He was 

discharged to [facility] where he is a long-term resident. He 

recently completed an antibiotic for a possible bacterial pneumonia. His brother

was concerned about him and wanted him brought in to see if there was anything 

else we could do for him. He has dementia and dysphagia at baseline. He is awake

and responsive upon my examination. He is difficult to understand, but he is 

able to convey that he is not in any pain. He is moaning, but he otherwise 

appears comfortable.


Source:  RN/MD


Exam Limitations:  clinical condition


Date Seen


10/13/20


Time Seen by a Provider:  09:00


Attending Physician


Di Brito MD


PCP


Ric Putnam DO


Referring Physician





Date of Admission


Oct 12, 2020 at 14:50





Home Medications & Allergies


Home Medications


Reviewed patient Home Medication Reconciliation performed by pharmacy medication

reconciliations technician and/or nursing.


Patients Allergies have been reviewed.





Allergies





Allergies


Coded Allergies


  No Known Drug Allergies (Unverified18)








Past Medical-Social-Family Hx


Past Med/Social Hx:  Reviewed Nursing Past Med/Soc Hx


Patient Social History


Alcohol Use:  Denies Use


Recreational Drug Use:  No


2nd Hand Smoke Exposure:  No


Recent Foreign Travel:  No


Contact w/other who traveled:  No


Recent Hopitalizations:  Yes (October, covid )


Recent Infectious Disease Expo:  No





Immunizations Up To Date


Tetanus Booster (TDap):  Unknown


Date of Pneumonia Vaccine:  Dec 1, 2019


Date of Influenza Vaccine:  Oct 1, 2019





Seasonal Allergies


Seasonal Allergies:  No





Past Medical History


Surgeries:  Joint Replacement, Orthopedic


Cardiac:  Hypertension


Neurological:  Dementia, Parkinson's Disease


Gastrointestinal:  Chronic Constipation


Musculoskeletal:  Fractures


Psychosocial:  Sleep Difficulties, Anxiety, Schizophrenia, Depression


History of Blood Disorders:  No


Adverse Reaction to Blood [place]:  No





Family History


No Pertinent Family Hx





Review of Systems


ROS-Unable to Obtain:  severe dementia


Constitutional:  see HPI





Physical Exam


Physical Exam


Vital Signs





Vital Signs - First Documented








 10/12/20 10/12/20





 11:20 15:59


 


Temp 36.9 


 


Pulse 90 


 


Resp 22 


 


B/P (MAP) 129/80 (96) 


 


Pulse Ox 92 


 


O2 Delivery  Nasal Cannula


 


O2 Flow Rate 4.00 





Capillary Refill : Less Than 3 Seconds


Height, Weight, BMI


Height: 5'8.00"


Weight: 183lbs. 3.0oz. [id number]; 20.70 BMI


Method:Estimated


General Appearance:  Chronically ill, Mild Distress (moaning)


HEENT:  Other (EOMI, drooling)


Neck:  Normal Inspection, Supple


Respiratory:  No Accessory Muscle Use, No Respiratory Distress; No Crackles; 

Rhonci; No Wheezing


Cardiovascular:  Regular Rate, Rhythm, No Edema, No Murmur


Gastrointestinal:  Normal Bowel Sounds, Non Tender, Soft


Extremity:  Normal Inspection, Non Tender, No Pedal Edema


Neurologic/Psychiatric:  Alert, Disoriented, Motor Weakness, Other


Skin:  Normal Color, Warm/Dry


Lymphatic:  No Adenopathy





Results


Results/Procedures


Labs


Laboratory Tests


10/12/20 11:36








10/12/20 17:03








10/13/20 06:05








Patient resulted labs reviewed.


Imaging:  Reviewed Imaging Report





Assessment/Plan


Admission Diagnosis


Acute respiratory failure due to COVID-19


Admission Status:  Inpatient Order (span 2 midnights)


Reason for Inpatient Admission:  


COVID requiring oxygen and IV medications





Assessment and Plan


Acute respiratory failure due to COVID-19


Severe Parkinson's dementia


Dysphagia


Poor prognosis


   Supplemental oxygen as needed, currently 3 L


   Decadron


   Remdesivir


   s/p 1 unit convalescent plasma on previous admission


   Lovenox


   Swallow evaluation


   Dysphagia diet, honey thick liquids


   Palliative care consult, appreciate assistance





HTN


Schizophrenia


   Continue home meds when able





DVT Prophylaxis: Lovenox





Diagnosis/Problems


Diagnosis/Problems





(1) Acute respiratory failure due to COVID-19


Status:  Acute


(2) Advanced dementia


Status:  Chronic


(3) Dysphagia


Status:  Chronic


(4) Poor prognosis


Status:  Acute





Clinical Quality Measures


DVT/VTE Risk/Contraindication:


Risk Factor Score Per Nursin


RFS Level Per Nursing on Admit:  4+=Very High











DI BRITO MD              Oct 13, 2020 09:20

## 2020-10-14 VITALS — DIASTOLIC BLOOD PRESSURE: 86 MMHG | SYSTOLIC BLOOD PRESSURE: 143 MMHG

## 2020-10-14 VITALS — DIASTOLIC BLOOD PRESSURE: 88 MMHG | SYSTOLIC BLOOD PRESSURE: 154 MMHG

## 2020-10-14 VITALS — DIASTOLIC BLOOD PRESSURE: 68 MMHG | SYSTOLIC BLOOD PRESSURE: 124 MMHG

## 2020-10-14 LAB
ALBUMIN SERPL-MCNC: 2.6 GM/DL (ref 3.2–4.5)
ALP SERPL-CCNC: 85 U/L (ref 40–136)
ALT SERPL-CCNC: 24 U/L (ref 0–55)
BILIRUB SERPL-MCNC: 0.5 MG/DL (ref 0.1–1)
BUN/CREAT SERPL: 39
CALCIUM SERPL-MCNC: 8.1 MG/DL (ref 8.5–10.1)
CHLORIDE SERPL-SCNC: 101 MMOL/L (ref 98–107)
CO2 SERPL-SCNC: 24 MMOL/L (ref 21–32)
CREAT SERPL-MCNC: 0.49 MG/DL (ref 0.6–1.3)
GFR SERPLBLD BASED ON 1.73 SQ M-ARVRAT: > 60 ML/MIN
GLUCOSE SERPL-MCNC: 84 MG/DL (ref 70–105)
POTASSIUM SERPL-SCNC: 3.9 MMOL/L (ref 3.6–5)
PROT SERPL-MCNC: 6.4 GM/DL (ref 6.4–8.2)
SODIUM SERPL-SCNC: 134 MMOL/L (ref 135–145)

## 2020-10-14 RX ADMIN — SODIUM CHLORIDE, SODIUM LACTATE, POTASSIUM CHLORIDE, AND CALCIUM CHLORIDE SCH MLS/HR: 600; 310; 30; 20 INJECTION, SOLUTION INTRAVENOUS at 08:21

## 2020-10-14 RX ADMIN — DOCUSATE SODIUM SCH MG: 100 CAPSULE ORAL at 19:53

## 2020-10-14 RX ADMIN — SODIUM CHLORIDE, SODIUM LACTATE, POTASSIUM CHLORIDE, AND CALCIUM CHLORIDE SCH MLS/HR: 600; 310; 30; 20 INJECTION, SOLUTION INTRAVENOUS at 02:42

## 2020-10-14 RX ADMIN — ENOXAPARIN SODIUM SCH MG: 100 INJECTION SUBCUTANEOUS at 08:19

## 2020-10-14 RX ADMIN — SENNOSIDES SCH MG: 8.6 TABLET, FILM COATED ORAL at 08:20

## 2020-10-14 RX ADMIN — SODIUM CHLORIDE, SODIUM LACTATE, POTASSIUM CHLORIDE, AND CALCIUM CHLORIDE SCH MLS/HR: 600; 310; 30; 20 INJECTION, SOLUTION INTRAVENOUS at 10:59

## 2020-10-14 RX ADMIN — SENNOSIDES SCH MG: 8.6 TABLET, FILM COATED ORAL at 19:54

## 2020-10-14 RX ADMIN — SODIUM CHLORIDE, SODIUM LACTATE, POTASSIUM CHLORIDE, AND CALCIUM CHLORIDE SCH MLS/HR: 600; 310; 30; 20 INJECTION, SOLUTION INTRAVENOUS at 20:46

## 2020-10-14 RX ADMIN — SODIUM CHLORIDE SCH MLS/HR: 900 INJECTION, SOLUTION INTRAVENOUS at 16:16

## 2020-10-14 RX ADMIN — DOCUSATE SODIUM SCH MG: 100 CAPSULE ORAL at 08:20

## 2020-10-14 NOTE — PROGRESS NOTE - HOSPITALIST
Subjective


HPI/CC On Admission


Date Seen by Provider:  Oct 14, 2020


Time Seen by Provider:  08:55


Bob Jose is a 59 year old male with advanced Parkinson's dementia who 

presented with worsening hypoxia due to COVID-19. He was recently admitted with 

COVID and discharged. He was not requiring oxygen at that time and did not 

receive Remdesivir, but did receive steroids and convalescent plasma. He was 

discharged to [facility] where he is a long-term resident. He 

recently completed an antibiotic for a possible bacterial pneumonia. His brother

was concerned about him and wanted him brought in to see if there was anything 

else we could do for him. He has dementia and dysphagia at baseline. He is awake

and responsive upon my examination. He is difficult to understand, but he is 

able to convey that he is not in any pain. He is moaning, but he otherwise 

appears comfortable.


Subjective/Events-last exam


He is lethargic but responsive. He mumbles incomprehensible sounds. He is unable

to cooperate due to his dementia.





Focused Exam


Lactate Level


10/12/20 11:36: Lactic Acid Level 0.86








Objective


Exam


Vital Signs





Vital Signs








  Date Time  Temp Pulse Resp B/P (MAP) Pulse Ox O2 Delivery O2 Flow Rate FiO2


 


10/14/20 08:22      Nasal Cannula 3.00 


 


10/14/20 07:27 36.5 80 19 154/88 (110) 93   





Capillary Refill : Less Than 3 SecondsLess Than 3 Seconds


General Appearance:  No Apparent Distress, Chronically ill


Respiratory:  Lungs Clear, Normal Breath Sounds, No Respiratory Distress


Cardiovascular:  Regular Rate, Rhythm, No Edema, No Murmur


Gastrointestinal:  Normal Bowel Sounds, Non Tender, Soft


Extremity:  Normal Inspection, Non Tender, No Pedal Edema


Neurologic/Psychiatric:  Aphasia, Other (lethargic, uncooperative)


Skin:  Normal Color, Warm/Dry





Results/Procedures


Lab


Laboratory Tests


10/14/20 08:25








Patient resulted labs reviewed.


Imaging:  Reviewed Imaging Report





Assessment/Plan


Assessment and Plan


Assess & Plan/Chief Complaint


Acute respiratory failure due to COVID-19


Severe Parkinson's dementia


Dysphagia


Poor prognosis


   Supplemental oxygen as needed, currently 3 L


   Decadron


   Remdesivir day 3/5


   s/p 1 unit convalescent plasma on previous admission


   Lovenox


   Dysphagia diet, honey thick liquids


   Palliative care consult, appreciate assistance





HTN


Schizophrenia


   Continue home meds when able





DVT Prophylaxis: Lovenox





Diagnosis/Problems


Diagnosis/Problems





(1) Acute respiratory failure due to COVID-19


Status:  Acute


(2) Advanced dementia


Status:  Chronic


(3) Dysphagia


Status:  Chronic


(4) Poor prognosis


Status:  Acute





Clinical Quality Measures


DVT/VTE Risk/Contraindication:


Risk Factor Score Per Nursin


RFS Level Per Nursing on Admit:  4+=Very High











VALDEMAR BRITO MD              Oct 14, 2020 09:28

## 2020-10-14 NOTE — NUR
Palliative Care RN in to see patient earlier today.  He is CV+ and getting Remdesavir and I 
think he has 2 doses left after today.  Possible discharge on Friday with return to 
[facility].  Called and spoke with patient's brother PEG Brown.   He was 
fe hanny when I mentioned hospice.  He didn't want to talk about it until he knew if the 
treatment for COVID worked first.  I explained that even without COVID in the picture that 
his brother had severe dementia and would be appropriate.  He made it clear that he knew all 
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 
in the last 4 years.

## 2020-10-15 VITALS — SYSTOLIC BLOOD PRESSURE: 134 MMHG | DIASTOLIC BLOOD PRESSURE: 71 MMHG

## 2020-10-15 VITALS — SYSTOLIC BLOOD PRESSURE: 123 MMHG | DIASTOLIC BLOOD PRESSURE: 75 MMHG

## 2020-10-15 LAB
ALBUMIN SERPL-MCNC: 2.4 GM/DL (ref 3.2–4.5)
ALP SERPL-CCNC: 78 U/L (ref 40–136)
ALT SERPL-CCNC: 20 U/L (ref 0–55)
BILIRUB SERPL-MCNC: 0.6 MG/DL (ref 0.1–1)
BUN/CREAT SERPL: 38
CALCIUM SERPL-MCNC: 7.9 MG/DL (ref 8.5–10.1)
CHLORIDE SERPL-SCNC: 98 MMOL/L (ref 98–107)
CO2 SERPL-SCNC: 24 MMOL/L (ref 21–32)
CREAT SERPL-MCNC: 0.47 MG/DL (ref 0.6–1.3)
GFR SERPLBLD BASED ON 1.73 SQ M-ARVRAT: > 60 ML/MIN
GLUCOSE SERPL-MCNC: 83 MG/DL (ref 70–105)
POTASSIUM SERPL-SCNC: 3.8 MMOL/L (ref 3.6–5)
PROT SERPL-MCNC: 6.1 GM/DL (ref 6.4–8.2)
SODIUM SERPL-SCNC: 130 MMOL/L (ref 135–145)

## 2020-10-15 RX ADMIN — CARBIDOPA AND LEVODOPA SCH EA: 25; 100 TABLET ORAL at 13:10

## 2020-10-15 RX ADMIN — SODIUM CHLORIDE SCH MLS/HR: 900 INJECTION, SOLUTION INTRAVENOUS at 08:21

## 2020-10-15 RX ADMIN — SODIUM CHLORIDE SCH MLS/HR: 900 INJECTION, SOLUTION INTRAVENOUS at 23:04

## 2020-10-15 RX ADMIN — ENOXAPARIN SODIUM SCH MG: 100 INJECTION SUBCUTANEOUS at 08:22

## 2020-10-15 RX ADMIN — SODIUM CHLORIDE SCH MLS/HR: 900 INJECTION, SOLUTION INTRAVENOUS at 13:41

## 2020-10-15 RX ADMIN — POLYETHYLENE GLYCOL (3350) SCH GM: 17 POWDER, FOR SOLUTION ORAL at 08:23

## 2020-10-15 RX ADMIN — PAROXETINE HYDROCHLORIDE SCH MG: 20 TABLET, FILM COATED ORAL at 08:23

## 2020-10-15 RX ADMIN — DILTIAZEM HYDROCHLORIDE SCH MG: 120 CAPSULE, COATED, EXTENDED RELEASE ORAL at 08:22

## 2020-10-15 RX ADMIN — RISPERIDONE SCH MG: 0.25 TABLET, FILM COATED ORAL at 08:23

## 2020-10-15 RX ADMIN — DIVALPROEX SODIUM SCH MG: 125 CAPSULE ORAL at 22:48

## 2020-10-15 RX ADMIN — SODIUM CHLORIDE SCH MLS/HR: 900 INJECTION, SOLUTION INTRAVENOUS at 23:38

## 2020-10-15 RX ADMIN — SODIUM CHLORIDE, SODIUM LACTATE, POTASSIUM CHLORIDE, AND CALCIUM CHLORIDE SCH MLS/HR: 600; 310; 30; 20 INJECTION, SOLUTION INTRAVENOUS at 05:35

## 2020-10-15 RX ADMIN — CARBIDOPA AND LEVODOPA SCH EA: 25; 100 TABLET ORAL at 08:24

## 2020-10-15 RX ADMIN — FAMOTIDINE SCH MG: 20 TABLET, FILM COATED ORAL at 08:23

## 2020-10-15 RX ADMIN — POLYETHYLENE GLYCOL (3350) SCH GM: 17 POWDER, FOR SOLUTION ORAL at 22:48

## 2020-10-15 RX ADMIN — DOCUSATE SODIUM SCH MG: 100 CAPSULE ORAL at 08:22

## 2020-10-15 RX ADMIN — CARBIDOPA AND LEVODOPA SCH EA: 25; 100 TABLET ORAL at 22:49

## 2020-10-15 RX ADMIN — ASPIRIN 81 MG CHEWABLE TABLET SCH MG: 81 TABLET CHEWABLE at 08:22

## 2020-10-15 RX ADMIN — SENNOSIDES SCH MG: 8.6 TABLET, FILM COATED ORAL at 22:48

## 2020-10-15 RX ADMIN — SENNOSIDES SCH MG: 8.6 TABLET, FILM COATED ORAL at 08:23

## 2020-10-15 RX ADMIN — DOCUSATE SODIUM SCH MG: 100 CAPSULE ORAL at 22:48

## 2020-10-15 RX ADMIN — DIVALPROEX SODIUM SCH MG: 125 CAPSULE ORAL at 08:23

## 2020-10-15 NOTE — NUR
PT UNABLE TO SAFELY SWALLOW PO MEDS AT THIS TIME.  DR BRITO AWARE.  ORAL CARE PERFORMED, PT 
TOLERATED WELL, WILL CONTINUE TO MONITOR.

## 2020-10-15 NOTE — PROGRESS NOTE - HOSPITALIST
Subjective


HPI/CC On Admission


Date Seen by Provider:  Oct 15, 2020


Time Seen by Provider:  09:20


Bob Jose is a 59 year old male with advanced Parkinson's dementia who 

presented with worsening hypoxia due to COVID-19. He was recently admitted with 

COVID and discharged. He was not requiring oxygen at that time and did not 

receive Remdesivir, but did receive steroids and convalescent plasma. He was 

discharged to [facility] where he is a long-term resident. He 

recently completed an antibiotic for a possible bacterial pneumonia. His brother

was concerned about him and wanted him brought in to see if there was anything 

else we could do for him. He has dementia and dysphagia at baseline. He is awake

and responsive upon my examination. He is difficult to understand, but he is 

able to convey that he is not in any pain. He is moaning, but he otherwise 

appears comfortable.


Subjective/Events-last exam


He mumbles incomprehensible words. He is demented and unable to cooperate.





Focused Exam


Lactate Level


10/12/20 11:36: Lactic Acid Level 0.86








Objective


Exam


Vital Signs





Vital Signs








  Date Time  Temp Pulse Resp B/P (MAP) Pulse Ox O2 Delivery O2 Flow Rate FiO2


 


10/15/20 08:00 36.3 76 18 134/71 (92) 93 Nasal Cannula 3.00 





Capillary Refill : Less Than 3 SecondsLess Than 3 Seconds


General Appearance:  No Apparent Distress, Chronically ill


Respiratory:  Lungs Clear, Normal Breath Sounds, No Respiratory Distress


Cardiovascular:  Regular Rate, Rhythm, No Edema, No Murmur


Gastrointestinal:  Normal Bowel Sounds, Soft


Extremity:  Normal Inspection, No Pedal Edema


Neurologic/Psychiatric:  Other (lethargic but responsive, uncooperative, mumbles

incomprehensible sounds)


Skin:  Normal Color, Warm/Dry





Results/Procedures


Lab


Laboratory Tests


10/15/20 06:55








Patient resulted labs reviewed.


Imaging:  Reviewed Imaging Report





Assessment/Plan


Assessment and Plan


Assess & Plan/Chief Complaint


Acute respiratory failure due to COVID-19


Severe Parkinson's dementia


Dysphagia


Poor prognosis


   Supplemental oxygen as needed, currently 2 L


   Decadron


   Remdesivir day 4/5


   s/p 1 unit convalescent plasma on previous admission


   Lovenox


   Dysphagia diet, honey thick liquids


   Palliative care consult, appreciate assistance





HTN


Schizophrenia


   Continue home meds





DVT Prophylaxis: Lovenox





Diagnosis/Problems


Diagnosis/Problems





(1) Acute respiratory failure due to COVID-19


Status:  Acute


(2) Advanced dementia


Status:  Chronic


(3) Dysphagia


Status:  Chronic


(4) Poor prognosis


Status:  Acute





Clinical Quality Measures


DVT/VTE Risk/Contraindication:


Risk Factor Score Per Nursin


RFS Level Per Nursing on Admit:  4+=Very High











VALDEMAR BRITO MD              Oct 15, 2020 09:47

## 2020-10-15 NOTE — NUR
PATIENT WAS 93% ON 2L/M OF OXYGEN PATIENT WAS PLACED ON ROOM AIR FOR 20 

MIN SAT DROPPED TO 90% AND REMAINED THERE PATIENT REMAINS ON ROOM AIR 

PATIENT IS UNABLE TO WALK

-------------------------------------------------------------------------------

Addendum: 10/15/20 at 1343 by RENETTA SULLIVAN RT

-------------------------------------------------------------------------------

Amended: Links added.

## 2020-10-16 VITALS — DIASTOLIC BLOOD PRESSURE: 79 MMHG | SYSTOLIC BLOOD PRESSURE: 130 MMHG

## 2020-10-16 VITALS — DIASTOLIC BLOOD PRESSURE: 66 MMHG | SYSTOLIC BLOOD PRESSURE: 117 MMHG

## 2020-10-16 LAB
ALBUMIN SERPL-MCNC: 2.7 GM/DL (ref 3.2–4.5)
ALP SERPL-CCNC: 91 U/L (ref 40–136)
ALT SERPL-CCNC: 21 U/L (ref 0–55)
BILIRUB SERPL-MCNC: 0.6 MG/DL (ref 0.1–1)
BUN/CREAT SERPL: 29
CALCIUM SERPL-MCNC: 8.2 MG/DL (ref 8.5–10.1)
CHLORIDE SERPL-SCNC: 100 MMOL/L (ref 98–107)
CO2 SERPL-SCNC: 20 MMOL/L (ref 21–32)
CREAT SERPL-MCNC: 0.51 MG/DL (ref 0.6–1.3)
GFR SERPLBLD BASED ON 1.73 SQ M-ARVRAT: > 60 ML/MIN
GLUCOSE SERPL-MCNC: 90 MG/DL (ref 70–105)
POTASSIUM SERPL-SCNC: 3.9 MMOL/L (ref 3.6–5)
PROT SERPL-MCNC: 6.7 GM/DL (ref 6.4–8.2)
SODIUM SERPL-SCNC: 131 MMOL/L (ref 135–145)

## 2020-10-16 RX ADMIN — DIVALPROEX SODIUM SCH MG: 125 CAPSULE ORAL at 09:15

## 2020-10-16 RX ADMIN — PAROXETINE HYDROCHLORIDE SCH MG: 20 TABLET, FILM COATED ORAL at 09:15

## 2020-10-16 RX ADMIN — ASPIRIN 81 MG CHEWABLE TABLET SCH MG: 81 TABLET CHEWABLE at 09:14

## 2020-10-16 RX ADMIN — RISPERIDONE SCH MG: 0.25 TABLET, FILM COATED ORAL at 09:15

## 2020-10-16 RX ADMIN — ENOXAPARIN SODIUM SCH MG: 100 INJECTION SUBCUTANEOUS at 12:13

## 2020-10-16 RX ADMIN — FAMOTIDINE SCH MG: 20 TABLET, FILM COATED ORAL at 09:15

## 2020-10-16 RX ADMIN — POLYETHYLENE GLYCOL (3350) SCH GM: 17 POWDER, FOR SOLUTION ORAL at 09:15

## 2020-10-16 RX ADMIN — DOCUSATE SODIUM SCH MG: 100 CAPSULE ORAL at 09:15

## 2020-10-16 RX ADMIN — SODIUM CHLORIDE SCH MLS/HR: 900 INJECTION, SOLUTION INTRAVENOUS at 08:47

## 2020-10-16 RX ADMIN — SENNOSIDES SCH MG: 8.6 TABLET, FILM COATED ORAL at 09:15

## 2020-10-16 RX ADMIN — CARBIDOPA AND LEVODOPA SCH EA: 25; 100 TABLET ORAL at 12:14

## 2020-10-16 RX ADMIN — CARBIDOPA AND LEVODOPA SCH EA: 25; 100 TABLET ORAL at 09:15

## 2020-10-16 RX ADMIN — DILTIAZEM HYDROCHLORIDE SCH MG: 120 CAPSULE, COATED, EXTENDED RELEASE ORAL at 09:15

## 2020-10-16 NOTE — DISCHARGE SUMMARY
Discharge Summary


Hospital Course


Was the Problem List Reviewed?:  Yes


Problems/Dx:  


(1) Acute respiratory failure due to COVID-19


Status:  Acute


(2) Advanced dementia


Status:  Chronic


(3) Dysphagia


Status:  Chronic


(4) Poor prognosis


Status:  Acute


Hospital Course


Date of Admission: Oct 12, 2020 at 14:50 


Admission Diagnosis :  Acute respiratory failure due to COVID-19





Family Physician/Provider: Cornelius Shane DO  





Date of Discharge: 10/16/20 


Discharge Diagnosis:  Acute respiratory failure due to COVID-19








Hospital Course:


Bob Jose is a 59-year-old male with advanced Parkinson's dementia who was 

admitted with acute respiratory failure due to COVID-19.  He had recently been 

discharged after an admission due to COVID but had not been requiring oxygen at 

that time.  During this hospital stay, he was given IV Decadron and Remdesivir. 

He had been given a unit of convalescent plasma on his previous stay.  His 

oxygen requirement improved and he was not requiring any supplemental oxygen at 

the time of discharge.  With his advanced dementia and poor prognosis, 

palliative care was consulted and discussed hospice care with his brother, 

Kevin, who is also his durable power of .  He did not elect to pursue 

hospice at this time.  The patient was discharged back to his nursing home in 

stable condition.  In his chronically ill state, he has a high likelihood for 

readmission.  He should follow-up with his primary care physician, Dr. Shane, in about 1-2 weeks.














Labs and Pending Lab Test:


Laboratory Tests


10/16/20 05:33: 


Sodium Level 131L, Potassium Level 3.9, Chloride Level 100, Carbon Dioxide Level

20L, Anion Gap 11, Blood Urea Nitrogen 15, Creatinine 0.51L, Estimat Glomerular 

Filtration Rate > 60, BUN/Creatinine Ratio 29, Glucose Level 90, Calcium Level 

8.2L, Corrected Calcium 9.2, Total Bilirubin 0.6, Aspartate Amino Transf 

(AST/SGOT) 20, Alanine Aminotransferase (ALT/SGPT) 21, Alkaline Phosphatase 91, 

Total Protein 6.7, Albumin 2.7L





Home Meds


Active


Cefdinir 300 Mg Capsule 300 Mg PO BID


Reported


Paroxetine HCl 20 Mg Tablet 40 Mg PO DAILY


     TAKES 2 (20MG) TABS


Acetaminophen 650 Mg Supp.rect 650 Mg RC Q4H PRN


Zinc Sulfate 220 Mg Capsule 220 Mg PO DAILY


Miralax (Polyethylene Glycol 3350) 17 Gm Powd.pack 17 Gm PO DAILY


Acid Reducer (FAMOTIDINE) (Famotidine) 20 Mg Tablet 20 Mg PO DAILY


     COVID PROTOCOL


Dexamethasone 6 Mg Tablet 6 Mg PO DAILY 10 Days


     COVID PROTOCOL


Aspirin 81 Mg Tab.chew 81 Mg PO DAILY


     GIVE 1 TABLET BY MOUTH ONE TIME A DAY FOR COVID FOR 14 DAYS


     LAST DOSE WILL BE ON 10/16/2020


Bisacodyl 10 Mg Supp.rect 10 Mg RC DAILY PRN


     USE IF NO BM IN 3 DAYS IF MOM NOT EFFECTIVE


Docusate Sodium 100 Mg Capsule 100 Mg PO DAILY


Depakote Sprinkle (Divalproex Sodium) 125 Mg Cap 125 Mg PO 1400


Divalproex Sodium 125 Mg Cap.sprink 250 Mg PO BID


     TAKES 2 (125MG) CAPS


Risperidone 0.5 Mg Tablet 0.5 Mg PO DAILY


Milk of Magnesia (Magnesium Hydroxide) 2,400 Mg/10 Ml Oral.susp 30 Ml PO DAILY 

PRN


Loperamide (Loperamide HCl) 2 Mg Tablet 2-4 Mg PO TID PRN MDD 6 TABS


     GIVE 2 TABS AS NEEDED AFTER LOOSE STOOL, THEN 1 TAB AFTER EACH LOOSE


     STOOL


Fleet Enema (Na Phos,M-B/Na Phos,Di-Ba) 133 Ml Enema 133 Ml RC DAILY PRN


     GIVE IF NO BM IN 5 DAYS


Tylenol (Acetaminophen) 325 Mg Tablet 650 Mg PO Q4H PRN


     TAKES 2 (325 MG) TABLETS


Sinemet  mg Tablet (Carbidopa/Levodopa) 1 Each Tablet 1 Tab PO TID


Entacapone 200 Mg Tablet 200 Mg PO BID


Diltiazem HCl 120 Mg Tablet 120 Mg PO DAILY


     HOLD FOR BLOOD PRESSURE LESS THAN 100/60 AND PULSE LESS THAN 60


Assessment/Pt Instructions


Take medications as prescribed.  Follow-up with your primary care physician in 

one to two weeks.  Return with worsening shortness of breath or feel like you're

getting worse.


Discharge Planning:  <30 minutes discharge planning





Discharge Instructions


Discharge Diet:  Other Diet (Dysphagia diet with honey thick liquids)


Activity as Tolerated:  Yes





Discharge Physical Examination


Vital Signs





Vital Signs








  Date Time  Temp Pulse Resp B/P (MAP) Pulse Ox O2 Delivery O2 Flow Rate FiO2


 


10/16/20 08:09 36.8 74 18 117/66 (83) 94 Room Air  


 


10/15/20 20:00       3.00 








General Appearance:  No Apparent Distress, Chronically ill


Respiratory:  Lungs Clear, Normal Breath Sounds, No Respiratory Distress


Cardiovascular:  Regular Rate, Rhythm, No Edema, No Murmur


Gastrointestinal:  Normal Bowel Sounds, Non Tender, Soft


Extremity:  Normal Inspection, Non Tender, No Pedal Edema


Skin:  Normal Color, Warm/Dry


Neurologic/Psychiatric:  Alert, Disoriented, Other (mumbles incomprehensible 

words)


Allergies:  


Coded Allergies:  


     No Known Drug Allergies (Unverified , 18)





Copy


Copies To 1:   CORNELIUS SHANE DO





Discharge Summary


Date of Admission


Oct 12, 2020 at 14:50


Date of Discharge





Discharge Date:  Oct 16, 2020


Discharge Time:  11:00


Admission Diagnosis


Acute respiratory failure due to COVID-19


Comfort Measures/


End of Life Care:  Pallative Care





Discharge Diagnosis


Acute respiratory failure due to COVID-19





(1) Acute respiratory failure due to COVID-19


Status:  Acute


(2) Advanced dementia


Status:  Chronic


(3) Dysphagia


Status:  Chronic


(4) Poor prognosis


Status:  Acute





Clinical Quality Measures


DVT/VTE Risk/Contraindication:


Risk Factor Score Per Nursin


RFS Level Per Nursing on Admit:  4+=Very High











VALDEMAR BRITO MD              Oct 16, 2020 09:27

## 2020-10-16 NOTE — NUR
FINAL DISCHARGE PLAN:  Patient will return to his previous placement at [facility] today.. He will be picked up by the facility at 1 pm.  I have spoken with his brother, 
Kevin Jose and answered his questions.  He has no problem with his return to [facility] today.

## 2023-01-19 NOTE — DIAGNOSTIC IMAGING REPORT
INDICATION: Worsening breath sounds.



COMPARISON: 02/09/2018



FINDINGS: Single frontal view of the chest demonstrates normal

heart size and pulmonary vascularity. The lungs are well aerated

and clear. No large pleural effusion or pneumothorax is seen. The

visualized osseous structures show no acute abnormalities.



IMPRESSION: 

1. No acute cardiopulmonary process.  



Dictated by: 



  Dictated on workstation # [id number] normal...

## 2024-08-23 NOTE — HISTORY & PHYSICAL-HOSPITALIST
HPI


History of Present Illness:


HPI/Chief Complaint


The patient is a 57-year-old white male with apparent schizophrenia recently 

admitted to a local nursing home who apparently fell with progressive left hip 

pain.  Upon presentation to the emergency room he had external rotation and 

foreshortening of the leg with a displaced intertrochanteric hip fracture.  He 

is unable to give any meaningful history during the interview but does not 

appear to be in acute distress.  He has no reported past history of 

cardiovascular or pulmonary disease.  He is asking to be fed and wanting to go 

home.


Date Seen


2/10/18


Time Seen by Provider:  07:30


Attending Physician


Ric Putnam Richard A DO


Referring Physician





Date of Admission


2018 at 20:41





Home Medications & Allergies


Home Medications


Reviewed patient Home Medication Reconciliation Form





Allergies





Allergies


Coded Allergies


  No Known Drug Allergies (Unverified18)








Past Medical-Social-Family Hx


Patient Social History


Alcohol Use:  Denies Use


Smoking Status:  Unknown if Ever Smoked


Recent Foreign Travel:  No


Contact w/other who traveled:  No


Recent Hopitalizations:  No (unknown)


Recent Infectious Disease Expo:  No





Seasonal Allergies


Seasonal Allergies:  No





Cardiovascular


Yes





Neurological


Yes





Psychosocial


Behavioral Health Disorders:  Anxiety, Schizophrenia, Depression





Blood Transfusions


History of Blood Disorders:  No


Adverse Reaction to a Blood Tr:  No





Review of Systems


Constitutional:  no symptoms reported





Physical Exam


Physical Exam


Vital Signs





Vital Signs - First Documented








 18





 15:43 19:29


 


Temp 100.3 


 


Pulse 83 


 


Resp 18 


 


B/P (MAP) 133/81 (98) 


 


Pulse Ox 95 


 


O2 Delivery  Room Air





Capillary Refill : Less Than 3 SecondsLess Than 3 Seconds


General Appearance:  Anxious (Confused asking for food and states he wants help 

getting up.)


Neck:  Full Range of Motion, Normal Inspection, Non Tender, Supple


Respiratory:  Chest Non Tender, Lungs Clear, Normal Breath Sounds, No Accessory 

Muscle Use, No Respiratory Distress


Cardiovascular:  Regular Rate, Rhythm, No Edema, No Gallop, No JVD, No Murmur, 

Normal Peripheral Pulses


Gastrointestinal:  Normal Bowel Sounds, No Organomegaly, No Pulsatile Mass, Non 

Tender, Soft


Extremity:  No Pedal Edema, Other (Left lower extremity foreshortened and 

externally rotated with mild left hip swelling and no evidence for purpura 

extremities are warm normal capillary refill)


Neurologic/Psychiatric:  Disoriented x3





Results


Results/Procedures


Lab


Laboratory Tests


18 15:56








2/10/18 06:07














Assessment/Plan


Admission Diagnosis


1.  Left displaced femoral neck fracture patient scheduled for repair per Dr. Valdez later today.  There are no medical contraindications to proceeding with 

planned procedure.


2.  Reported history of schizophrenia patient unable to give any history 

currently.  We are still waiting on home medication list.  We will review when 

available.  No other information available this time.





Clinical Quality Measures


DVT/VTE Risk/Contraindication:


Risk Factor Score Per Nursin


RFS Level Per Nursing on Admit:  4+=Very High











LAUREN EMERSON MD Feb 10, 2018 08:51 [As Noted in HPI] : as noted in HPI [Abnormal Sensation] : an abnormal sensation [Negative] : ENT [de-identified] : LBP, R leg pain